# Patient Record
Sex: MALE | Race: WHITE | Employment: OTHER | URBAN - METROPOLITAN AREA
[De-identification: names, ages, dates, MRNs, and addresses within clinical notes are randomized per-mention and may not be internally consistent; named-entity substitution may affect disease eponyms.]

---

## 2017-03-28 ENCOUNTER — OFFICE VISIT (OUTPATIENT)
Dept: PRIMARY CARE CLINIC | Age: 52
End: 2017-03-28
Payer: COMMERCIAL

## 2017-03-28 VITALS
HEIGHT: 64 IN | SYSTOLIC BLOOD PRESSURE: 154 MMHG | TEMPERATURE: 98.7 F | RESPIRATION RATE: 20 BRPM | HEART RATE: 102 BPM | DIASTOLIC BLOOD PRESSURE: 100 MMHG | BODY MASS INDEX: 19.44 KG/M2 | WEIGHT: 113.9 LBS | OXYGEN SATURATION: 94 %

## 2017-03-28 DIAGNOSIS — Z12.5 SCREENING PSA (PROSTATE SPECIFIC ANTIGEN): ICD-10-CM

## 2017-03-28 DIAGNOSIS — J44.9 CHRONIC OBSTRUCTIVE PULMONARY DISEASE, UNSPECIFIED COPD TYPE (HCC): Primary | ICD-10-CM

## 2017-03-28 DIAGNOSIS — Z23 NEED FOR STREPTOCOCCUS PNEUMONIAE VACCINATION: ICD-10-CM

## 2017-03-28 DIAGNOSIS — L98.9 FACIAL SKIN LESION: ICD-10-CM

## 2017-03-28 DIAGNOSIS — Z13.220 LIPID SCREENING: ICD-10-CM

## 2017-03-28 DIAGNOSIS — I10 ESSENTIAL HYPERTENSION: ICD-10-CM

## 2017-03-28 PROCEDURE — 90732 PPSV23 VACC 2 YRS+ SUBQ/IM: CPT | Performed by: NURSE PRACTITIONER

## 2017-03-28 PROCEDURE — 99203 OFFICE O/P NEW LOW 30 MIN: CPT | Performed by: NURSE PRACTITIONER

## 2017-03-28 PROCEDURE — 90471 IMMUNIZATION ADMIN: CPT | Performed by: NURSE PRACTITIONER

## 2017-03-28 RX ORDER — LOSARTAN POTASSIUM 50 MG/1
50 TABLET ORAL DAILY
Qty: 30 TABLET | Refills: 1 | Status: SHIPPED | OUTPATIENT
Start: 2017-03-28 | End: 2017-04-11 | Stop reason: SDUPTHER

## 2017-03-28 RX ORDER — BUDESONIDE AND FORMOTEROL FUMARATE DIHYDRATE 80; 4.5 UG/1; UG/1
2 AEROSOL RESPIRATORY (INHALATION) 2 TIMES DAILY
Qty: 1 INHALER | Refills: 11 | Status: SHIPPED | OUTPATIENT
Start: 2017-03-28 | End: 2018-04-03 | Stop reason: SDUPTHER

## 2017-03-28 RX ORDER — ALBUTEROL SULFATE 90 UG/1
2 AEROSOL, METERED RESPIRATORY (INHALATION) EVERY 6 HOURS PRN
Qty: 1 INHALER | Refills: 3 | Status: SHIPPED | OUTPATIENT
Start: 2017-03-28 | End: 2018-02-26 | Stop reason: SDUPTHER

## 2017-03-28 ASSESSMENT — ENCOUNTER SYMPTOMS
SHORTNESS OF BREATH: 1
NAUSEA: 0
SORE THROAT: 0
WHEEZING: 1
ABDOMINAL PAIN: 0
DIARRHEA: 0
VOMITING: 0
CONSTIPATION: 0
HEMOPTYSIS: 0
SPUTUM PRODUCTION: 1
COUGH: 0
RHINORRHEA: 0

## 2017-03-29 ENCOUNTER — TELEPHONE (OUTPATIENT)
Dept: PRIMARY CARE CLINIC | Age: 52
End: 2017-03-29

## 2017-04-05 ENCOUNTER — OFFICE VISIT (OUTPATIENT)
Dept: SURGERY | Age: 52
End: 2017-04-05
Payer: COMMERCIAL

## 2017-04-05 ENCOUNTER — HOSPITAL ENCOUNTER (OUTPATIENT)
Age: 52
Discharge: HOME | End: 2017-04-05
Payer: COMMERCIAL

## 2017-04-05 VITALS
SYSTOLIC BLOOD PRESSURE: 161 MMHG | TEMPERATURE: 98.4 F | RESPIRATION RATE: 20 BRPM | DIASTOLIC BLOOD PRESSURE: 82 MMHG | HEIGHT: 64 IN | WEIGHT: 115.9 LBS | HEART RATE: 99 BPM | BODY MASS INDEX: 19.79 KG/M2

## 2017-04-05 DIAGNOSIS — J44.9 CHRONIC OBSTRUCTIVE PULMONARY DISEASE, UNSPECIFIED COPD TYPE (HCC): ICD-10-CM

## 2017-04-05 DIAGNOSIS — L98.9 FACIAL LESION: Primary | ICD-10-CM

## 2017-04-05 DIAGNOSIS — Z13.220 LIPID SCREENING: ICD-10-CM

## 2017-04-05 LAB
ALBUMIN SERPL-MCNC: 4.3 G/DL (ref 3.5–5.2)
ALBUMIN/GLOBULIN RATIO: 1.7 (ref 1–2.5)
ALP BLD-CCNC: 84 U/L (ref 40–129)
ALT SERPL-CCNC: 23 U/L (ref 5–41)
ANION GAP SERPL CALCULATED.3IONS-SCNC: 12 MMOL/L (ref 9–17)
AST SERPL-CCNC: 27 U/L
BILIRUB SERPL-MCNC: 1.47 MG/DL (ref 0.3–1.2)
BUN BLDV-MCNC: 11 MG/DL (ref 6–20)
BUN/CREAT BLD: 15 (ref 9–20)
CALCIUM SERPL-MCNC: 9.1 MG/DL (ref 8.6–10.4)
CHLORIDE BLD-SCNC: 98 MMOL/L (ref 98–107)
CHOLESTEROL/HDL RATIO: 3.4
CHOLESTEROL: 218 MG/DL
CO2: 27 MMOL/L (ref 20–31)
CREAT SERPL-MCNC: 0.74 MG/DL (ref 0.7–1.2)
GFR AFRICAN AMERICAN: >60 ML/MIN
GFR NON-AFRICAN AMERICAN: >60 ML/MIN
GFR SERPL CREATININE-BSD FRML MDRD: ABNORMAL ML/MIN/{1.73_M2}
GFR SERPL CREATININE-BSD FRML MDRD: ABNORMAL ML/MIN/{1.73_M2}
GLUCOSE BLD-MCNC: 111 MG/DL (ref 70–99)
HDLC SERPL-MCNC: 64 MG/DL
LDL CHOLESTEROL: 134 MG/DL (ref 0–130)
POTASSIUM SERPL-SCNC: 3.7 MMOL/L (ref 3.7–5.3)
PROSTATE SPECIFIC ANTIGEN: 1.46 UG/L
SODIUM BLD-SCNC: 137 MMOL/L (ref 135–144)
TOTAL PROTEIN: 6.9 G/DL (ref 6.4–8.3)
TRIGL SERPL-MCNC: 100 MG/DL
VLDLC SERPL CALC-MCNC: ABNORMAL MG/DL (ref 1–30)

## 2017-04-05 PROCEDURE — 80061 LIPID PANEL: CPT

## 2017-04-05 PROCEDURE — 80053 COMPREHEN METABOLIC PANEL: CPT

## 2017-04-05 PROCEDURE — 36415 COLL VENOUS BLD VENIPUNCTURE: CPT

## 2017-04-05 PROCEDURE — 99204 OFFICE O/P NEW MOD 45 MIN: CPT | Performed by: SURGERY

## 2017-04-05 PROCEDURE — G0103 PSA SCREENING: HCPCS

## 2017-04-05 RX ORDER — MUPIROCIN CALCIUM 20 MG/G
CREAM TOPICAL
Qty: 1 TUBE | Refills: 0 | Status: SHIPPED | OUTPATIENT
Start: 2017-04-05 | End: 2017-04-11 | Stop reason: ALTCHOICE

## 2017-04-06 ENCOUNTER — HOSPITAL ENCOUNTER (OUTPATIENT)
Dept: PULMONOLOGY | Age: 52
Discharge: HOME | End: 2017-04-06
Payer: COMMERCIAL

## 2017-04-06 DIAGNOSIS — J44.9 CHRONIC OBSTRUCTIVE PULMONARY DISEASE, UNSPECIFIED COPD TYPE (HCC): ICD-10-CM

## 2017-04-06 PROCEDURE — 94729 DIFFUSING CAPACITY: CPT

## 2017-04-06 PROCEDURE — 94060 EVALUATION OF WHEEZING: CPT

## 2017-04-06 PROCEDURE — 94726 PLETHYSMOGRAPHY LUNG VOLUMES: CPT

## 2017-04-06 PROCEDURE — 94664 DEMO&/EVAL PT USE INHALER: CPT

## 2017-04-06 PROCEDURE — 6360000002 HC RX W HCPCS: Performed by: INTERNAL MEDICINE

## 2017-04-06 RX ORDER — ALBUTEROL SULFATE 2.5 MG/3ML
2.5 SOLUTION RESPIRATORY (INHALATION) ONCE
Status: COMPLETED | OUTPATIENT
Start: 2017-04-06 | End: 2017-04-06

## 2017-04-06 RX ADMIN — ALBUTEROL SULFATE 2.5 MG: 2.5 SOLUTION RESPIRATORY (INHALATION) at 13:19

## 2017-04-07 ENCOUNTER — TELEPHONE (OUTPATIENT)
Dept: PRIMARY CARE CLINIC | Age: 52
End: 2017-04-07

## 2017-04-07 DIAGNOSIS — J44.9 CHRONIC OBSTRUCTIVE PULMONARY DISEASE, UNSPECIFIED COPD TYPE (HCC): Primary | ICD-10-CM

## 2017-04-11 ENCOUNTER — OFFICE VISIT (OUTPATIENT)
Dept: PRIMARY CARE CLINIC | Age: 52
End: 2017-04-11
Payer: COMMERCIAL

## 2017-04-11 VITALS
DIASTOLIC BLOOD PRESSURE: 85 MMHG | TEMPERATURE: 99.6 F | SYSTOLIC BLOOD PRESSURE: 119 MMHG | HEART RATE: 106 BPM | RESPIRATION RATE: 20 BRPM | WEIGHT: 115 LBS | BODY MASS INDEX: 19.63 KG/M2 | HEIGHT: 64 IN

## 2017-04-11 DIAGNOSIS — R73.01 ELEVATED FASTING GLUCOSE: ICD-10-CM

## 2017-04-11 DIAGNOSIS — I10 ESSENTIAL HYPERTENSION: Primary | ICD-10-CM

## 2017-04-11 DIAGNOSIS — E78.5 DYSLIPIDEMIA: ICD-10-CM

## 2017-04-11 LAB — HBA1C MFR BLD: 5.3 %

## 2017-04-11 PROCEDURE — 99214 OFFICE O/P EST MOD 30 MIN: CPT | Performed by: NURSE PRACTITIONER

## 2017-04-11 PROCEDURE — 83036 HEMOGLOBIN GLYCOSYLATED A1C: CPT | Performed by: NURSE PRACTITIONER

## 2017-04-11 RX ORDER — LOSARTAN POTASSIUM 50 MG/1
100 TABLET ORAL DAILY
Qty: 90 TABLET | Refills: 1 | Status: SHIPPED | OUTPATIENT
Start: 2017-04-11 | End: 2017-04-12 | Stop reason: SDUPTHER

## 2017-04-11 ASSESSMENT — ENCOUNTER SYMPTOMS: SHORTNESS OF BREATH: 1

## 2017-04-11 ASSESSMENT — PATIENT HEALTH QUESTIONNAIRE - PHQ9
1. LITTLE INTEREST OR PLEASURE IN DOING THINGS: 1
SUM OF ALL RESPONSES TO PHQ QUESTIONS 1-9: 2
2. FEELING DOWN, DEPRESSED OR HOPELESS: 1
SUM OF ALL RESPONSES TO PHQ9 QUESTIONS 1 & 2: 2

## 2017-04-12 ENCOUNTER — ANESTHESIA EVENT (OUTPATIENT)
Dept: OPERATING ROOM | Age: 52
End: 2017-04-12
Payer: COMMERCIAL

## 2017-04-12 DIAGNOSIS — I10 ESSENTIAL HYPERTENSION: ICD-10-CM

## 2017-04-12 RX ORDER — LOSARTAN POTASSIUM 100 MG/1
100 TABLET ORAL DAILY
Qty: 90 TABLET | Refills: 3 | Status: ON HOLD | OUTPATIENT
Start: 2017-04-12 | End: 2018-08-20

## 2017-04-13 ENCOUNTER — ANESTHESIA (OUTPATIENT)
Dept: OPERATING ROOM | Age: 52
End: 2017-04-13
Payer: COMMERCIAL

## 2017-04-13 ENCOUNTER — HOSPITAL ENCOUNTER (OUTPATIENT)
Age: 52
Setting detail: OUTPATIENT SURGERY
Discharge: HOME | End: 2017-04-13
Attending: SURGERY | Admitting: SURGERY
Payer: COMMERCIAL

## 2017-04-13 VITALS
DIASTOLIC BLOOD PRESSURE: 84 MMHG | OXYGEN SATURATION: 95 % | HEART RATE: 90 BPM | TEMPERATURE: 98 F | SYSTOLIC BLOOD PRESSURE: 126 MMHG | BODY MASS INDEX: 19.63 KG/M2 | RESPIRATION RATE: 16 BRPM | HEIGHT: 64 IN | WEIGHT: 115 LBS

## 2017-04-13 VITALS — SYSTOLIC BLOOD PRESSURE: 86 MMHG | OXYGEN SATURATION: 98 % | DIASTOLIC BLOOD PRESSURE: 49 MMHG

## 2017-04-13 PROCEDURE — 2580000003 HC RX 258: Performed by: SURGERY

## 2017-04-13 PROCEDURE — 7100000011 HC PHASE II RECOVERY - ADDTL 15 MIN: Performed by: SURGERY

## 2017-04-13 PROCEDURE — 7100000010 HC PHASE II RECOVERY - FIRST 15 MIN: Performed by: SURGERY

## 2017-04-13 PROCEDURE — 2500000003 HC RX 250 WO HCPCS: Performed by: NURSE ANESTHETIST, CERTIFIED REGISTERED

## 2017-04-13 PROCEDURE — 3700000000 HC ANESTHESIA ATTENDED CARE: Performed by: SURGERY

## 2017-04-13 PROCEDURE — 3600000002 HC SURGERY LEVEL 2 BASE: Performed by: SURGERY

## 2017-04-13 PROCEDURE — 6360000002 HC RX W HCPCS: Performed by: NURSE ANESTHETIST, CERTIFIED REGISTERED

## 2017-04-13 PROCEDURE — 2500000003 HC RX 250 WO HCPCS: Performed by: SURGERY

## 2017-04-13 PROCEDURE — 88305 TISSUE EXAM BY PATHOLOGIST: CPT

## 2017-04-13 PROCEDURE — 3600000012 HC SURGERY LEVEL 2 ADDTL 15MIN: Performed by: SURGERY

## 2017-04-13 PROCEDURE — 3700000001 HC ADD 15 MINUTES (ANESTHESIA): Performed by: SURGERY

## 2017-04-13 RX ORDER — PROPOFOL 10 MG/ML
INJECTION, EMULSION INTRAVENOUS CONTINUOUS PRN
Status: DISCONTINUED | OUTPATIENT
Start: 2017-04-13 | End: 2017-04-13 | Stop reason: SDUPTHER

## 2017-04-13 RX ORDER — BUPIVACAINE HYDROCHLORIDE AND EPINEPHRINE 2.5; 5 MG/ML; UG/ML
INJECTION, SOLUTION EPIDURAL; INFILTRATION; INTRACAUDAL; PERINEURAL PRN
Status: DISCONTINUED | OUTPATIENT
Start: 2017-04-13 | End: 2017-04-13 | Stop reason: HOSPADM

## 2017-04-13 RX ORDER — HYDROCODONE BITARTRATE AND ACETAMINOPHEN 5; 325 MG/1; MG/1
1 TABLET ORAL EVERY 8 HOURS PRN
Qty: 20 TABLET | Refills: 0 | Status: SHIPPED | OUTPATIENT
Start: 2017-04-13 | End: 2017-04-20

## 2017-04-13 RX ORDER — OXYCODONE HYDROCHLORIDE 5 MG/1
5 TABLET ORAL
Status: DISCONTINUED | OUTPATIENT
Start: 2017-04-13 | End: 2017-04-13 | Stop reason: HOSPADM

## 2017-04-13 RX ORDER — FENTANYL CITRATE 50 UG/ML
25 INJECTION, SOLUTION INTRAMUSCULAR; INTRAVENOUS EVERY 5 MIN PRN
Status: DISCONTINUED | OUTPATIENT
Start: 2017-04-13 | End: 2017-04-13 | Stop reason: HOSPADM

## 2017-04-13 RX ORDER — MORPHINE SULFATE 2 MG/ML
2 INJECTION, SOLUTION INTRAMUSCULAR; INTRAVENOUS EVERY 5 MIN PRN
Status: DISCONTINUED | OUTPATIENT
Start: 2017-04-13 | End: 2017-04-13 | Stop reason: HOSPADM

## 2017-04-13 RX ORDER — PROPOFOL 10 MG/ML
INJECTION, EMULSION INTRAVENOUS PRN
Status: DISCONTINUED | OUTPATIENT
Start: 2017-04-13 | End: 2017-04-13 | Stop reason: SDUPTHER

## 2017-04-13 RX ORDER — SODIUM CHLORIDE, SODIUM LACTATE, POTASSIUM CHLORIDE, CALCIUM CHLORIDE 600; 310; 30; 20 MG/100ML; MG/100ML; MG/100ML; MG/100ML
INJECTION, SOLUTION INTRAVENOUS CONTINUOUS
Status: DISCONTINUED | OUTPATIENT
Start: 2017-04-13 | End: 2017-04-13 | Stop reason: HOSPADM

## 2017-04-13 RX ORDER — MEPERIDINE HYDROCHLORIDE 25 MG/ML
12.5 INJECTION INTRAMUSCULAR; INTRAVENOUS; SUBCUTANEOUS EVERY 5 MIN PRN
Status: DISCONTINUED | OUTPATIENT
Start: 2017-04-13 | End: 2017-04-13 | Stop reason: HOSPADM

## 2017-04-13 RX ORDER — LIDOCAINE HYDROCHLORIDE 20 MG/ML
INJECTION, SOLUTION INFILTRATION; PERINEURAL PRN
Status: DISCONTINUED | OUTPATIENT
Start: 2017-04-13 | End: 2017-04-13 | Stop reason: SDUPTHER

## 2017-04-13 RX ORDER — MIDAZOLAM HYDROCHLORIDE 1 MG/ML
INJECTION INTRAMUSCULAR; INTRAVENOUS PRN
Status: DISCONTINUED | OUTPATIENT
Start: 2017-04-13 | End: 2017-04-13 | Stop reason: SDUPTHER

## 2017-04-13 RX ORDER — FENTANYL CITRATE 50 UG/ML
INJECTION, SOLUTION INTRAMUSCULAR; INTRAVENOUS PRN
Status: DISCONTINUED | OUTPATIENT
Start: 2017-04-13 | End: 2017-04-13 | Stop reason: SDUPTHER

## 2017-04-13 RX ADMIN — MIDAZOLAM HYDROCHLORIDE 2 MG: 1 INJECTION, SOLUTION INTRAMUSCULAR; INTRAVENOUS at 13:20

## 2017-04-13 RX ADMIN — FENTANYL CITRATE 50 MCG: 50 INJECTION INTRAMUSCULAR; INTRAVENOUS at 13:20

## 2017-04-13 RX ADMIN — FENTANYL CITRATE 50 MCG: 50 INJECTION INTRAMUSCULAR; INTRAVENOUS at 13:35

## 2017-04-13 RX ADMIN — PROPOFOL 120 MCG/KG/MIN: 10 INJECTION, EMULSION INTRAVENOUS at 13:35

## 2017-04-13 RX ADMIN — LIDOCAINE HYDROCHLORIDE 100 MG: 20 INJECTION, SOLUTION INFILTRATION; PERINEURAL at 13:30

## 2017-04-13 RX ADMIN — PROPOFOL 160 MG: 10 INJECTION, EMULSION INTRAVENOUS at 13:30

## 2017-04-13 RX ADMIN — SODIUM CHLORIDE, POTASSIUM CHLORIDE, SODIUM LACTATE AND CALCIUM CHLORIDE: 600; 310; 30; 20 INJECTION, SOLUTION INTRAVENOUS at 11:15

## 2017-04-13 RX ADMIN — SODIUM CHLORIDE, POTASSIUM CHLORIDE, SODIUM LACTATE AND CALCIUM CHLORIDE: 600; 310; 30; 20 INJECTION, SOLUTION INTRAVENOUS at 13:50

## 2017-04-13 RX ADMIN — SODIUM CHLORIDE, POTASSIUM CHLORIDE, SODIUM LACTATE AND CALCIUM CHLORIDE: 600; 310; 30; 20 INJECTION, SOLUTION INTRAVENOUS at 13:00

## 2017-04-13 RX ADMIN — PROPOFOL 40 MG: 10 INJECTION, EMULSION INTRAVENOUS at 13:35

## 2017-04-13 ASSESSMENT — COPD QUESTIONNAIRES: CAT_SEVERITY: SEVERE

## 2017-04-13 ASSESSMENT — ENCOUNTER SYMPTOMS: DYSPNEA ACTIVITY LEVEL: AFTER AMBULATING 1 FLIGHT OF STAIRS

## 2017-04-13 ASSESSMENT — PAIN SCALES - GENERAL
PAINLEVEL_OUTOF10: 0

## 2017-04-13 ASSESSMENT — PAIN - FUNCTIONAL ASSESSMENT: PAIN_FUNCTIONAL_ASSESSMENT: 0-10

## 2017-04-17 LAB — DERMATOLOGY PATHOLOGY REPORT: NORMAL

## 2017-04-20 ENCOUNTER — OFFICE VISIT (OUTPATIENT)
Dept: SURGERY | Age: 52
End: 2017-04-20
Payer: COMMERCIAL

## 2017-04-20 ENCOUNTER — TELEPHONE (OUTPATIENT)
Dept: SURGERY | Age: 52
End: 2017-04-20

## 2017-04-20 VITALS
WEIGHT: 115 LBS | SYSTOLIC BLOOD PRESSURE: 142 MMHG | RESPIRATION RATE: 18 BRPM | OXYGEN SATURATION: 96 % | DIASTOLIC BLOOD PRESSURE: 103 MMHG | TEMPERATURE: 98.6 F | BODY MASS INDEX: 19.63 KG/M2 | HEART RATE: 91 BPM | HEIGHT: 64 IN

## 2017-04-20 DIAGNOSIS — C44.91 BASAL CELL CARCINOMA: Primary | ICD-10-CM

## 2017-04-20 PROCEDURE — 99212 OFFICE O/P EST SF 10 MIN: CPT | Performed by: SURGERY

## 2017-04-26 ENCOUNTER — NURSE ONLY (OUTPATIENT)
Dept: PRIMARY CARE CLINIC | Age: 52
End: 2017-04-26

## 2017-04-26 VITALS
TEMPERATURE: 98.9 F | WEIGHT: 116.1 LBS | HEART RATE: 101 BPM | BODY MASS INDEX: 19.82 KG/M2 | RESPIRATION RATE: 16 BRPM | DIASTOLIC BLOOD PRESSURE: 88 MMHG | SYSTOLIC BLOOD PRESSURE: 125 MMHG | HEIGHT: 64 IN

## 2017-04-26 DIAGNOSIS — I10 ESSENTIAL HYPERTENSION: Primary | ICD-10-CM

## 2017-06-01 ENCOUNTER — OFFICE VISIT (OUTPATIENT)
Dept: PULMONOLOGY | Age: 52
End: 2017-06-01
Payer: COMMERCIAL

## 2017-06-01 VITALS
DIASTOLIC BLOOD PRESSURE: 104 MMHG | WEIGHT: 115 LBS | OXYGEN SATURATION: 96 % | TEMPERATURE: 97.9 F | HEART RATE: 108 BPM | HEIGHT: 64 IN | BODY MASS INDEX: 19.63 KG/M2 | SYSTOLIC BLOOD PRESSURE: 156 MMHG | RESPIRATION RATE: 16 BRPM

## 2017-06-01 DIAGNOSIS — J44.9 CHRONIC OBSTRUCTIVE PULMONARY DISEASE, UNSPECIFIED COPD TYPE (HCC): Primary | ICD-10-CM

## 2017-06-01 DIAGNOSIS — R05.3 CHRONIC COUGH: ICD-10-CM

## 2017-06-01 DIAGNOSIS — R06.09 DYSPNEA ON EXERTION: ICD-10-CM

## 2017-06-01 PROCEDURE — 99204 OFFICE O/P NEW MOD 45 MIN: CPT | Performed by: INTERNAL MEDICINE

## 2017-06-01 RX ORDER — ALBUTEROL SULFATE 2.5 MG/3ML
2.5 SOLUTION RESPIRATORY (INHALATION) EVERY 6 HOURS PRN
Qty: 120 EACH | Refills: 11 | Status: SHIPPED | OUTPATIENT
Start: 2017-06-01 | End: 2019-01-22 | Stop reason: CLARIF

## 2017-06-07 ENCOUNTER — TELEPHONE (OUTPATIENT)
Dept: PULMONOLOGY | Age: 52
End: 2017-06-07

## 2017-06-07 ENCOUNTER — HOSPITAL ENCOUNTER (OUTPATIENT)
Dept: PULMONOLOGY | Age: 52
Discharge: HOME | End: 2017-06-07
Payer: COMMERCIAL

## 2017-06-07 DIAGNOSIS — J44.9 CHRONIC OBSTRUCTIVE PULMONARY DISEASE, UNSPECIFIED COPD TYPE (HCC): ICD-10-CM

## 2017-06-07 PROCEDURE — 94761 N-INVAS EAR/PLS OXIMETRY MLT: CPT

## 2017-06-08 ENCOUNTER — PATIENT MESSAGE (OUTPATIENT)
Dept: PRIMARY CARE CLINIC | Age: 52
End: 2017-06-08

## 2017-06-08 DIAGNOSIS — J44.9 CHRONIC OBSTRUCTIVE PULMONARY DISEASE, UNSPECIFIED COPD TYPE (HCC): Primary | ICD-10-CM

## 2017-06-13 ENCOUNTER — PATIENT MESSAGE (OUTPATIENT)
Dept: PRIMARY CARE CLINIC | Age: 52
End: 2017-06-13

## 2017-06-16 ENCOUNTER — TELEPHONE (OUTPATIENT)
Dept: SURGERY | Age: 52
End: 2017-06-16

## 2017-06-22 DIAGNOSIS — Z11.59 NEED FOR HEPATITIS C SCREENING TEST: Primary | ICD-10-CM

## 2017-06-22 DIAGNOSIS — Z11.4 SCREENING FOR HIV (HUMAN IMMUNODEFICIENCY VIRUS): ICD-10-CM

## 2017-06-23 ENCOUNTER — HOSPITAL ENCOUNTER (OUTPATIENT)
Age: 52
Discharge: HOME | End: 2017-06-23
Payer: COMMERCIAL

## 2017-06-23 DIAGNOSIS — Z12.5 SCREENING PSA (PROSTATE SPECIFIC ANTIGEN): ICD-10-CM

## 2017-06-23 DIAGNOSIS — Z11.4 SCREENING FOR HIV (HUMAN IMMUNODEFICIENCY VIRUS): ICD-10-CM

## 2017-06-23 DIAGNOSIS — Z11.59 NEED FOR HEPATITIS C SCREENING TEST: ICD-10-CM

## 2017-06-23 LAB
HEPATITIS C ANTIBODY: NONREACTIVE
HIV AG/AB: NONREACTIVE
PROSTATE SPECIFIC ANTIGEN: 1.69 UG/L

## 2017-06-23 PROCEDURE — 36415 COLL VENOUS BLD VENIPUNCTURE: CPT

## 2017-06-23 PROCEDURE — 87389 HIV-1 AG W/HIV-1&-2 AB AG IA: CPT

## 2017-06-23 PROCEDURE — 86803 HEPATITIS C AB TEST: CPT

## 2017-06-23 PROCEDURE — G0103 PSA SCREENING: HCPCS

## 2017-06-26 ENCOUNTER — TELEPHONE (OUTPATIENT)
Dept: PRIMARY CARE CLINIC | Age: 52
End: 2017-06-26

## 2017-06-27 ENCOUNTER — TELEPHONE (OUTPATIENT)
Dept: SURGERY | Age: 52
End: 2017-06-27

## 2017-06-27 ENCOUNTER — PATIENT MESSAGE (OUTPATIENT)
Dept: PRIMARY CARE CLINIC | Age: 52
End: 2017-06-27

## 2017-06-29 ENCOUNTER — TELEPHONE (OUTPATIENT)
Dept: SURGERY | Age: 52
End: 2017-06-29

## 2017-06-29 DIAGNOSIS — C44.319 BASAL CELL CARCINOMA OF LEFT CHEEK: Primary | ICD-10-CM

## 2017-07-11 ENCOUNTER — OFFICE VISIT (OUTPATIENT)
Dept: PRIMARY CARE CLINIC | Age: 52
End: 2017-07-11
Payer: COMMERCIAL

## 2017-07-11 VITALS
HEART RATE: 133 BPM | WEIGHT: 112.6 LBS | HEIGHT: 64 IN | SYSTOLIC BLOOD PRESSURE: 107 MMHG | DIASTOLIC BLOOD PRESSURE: 75 MMHG | BODY MASS INDEX: 19.22 KG/M2 | RESPIRATION RATE: 20 BRPM | TEMPERATURE: 99.7 F

## 2017-07-11 DIAGNOSIS — E78.5 DYSLIPIDEMIA: ICD-10-CM

## 2017-07-11 DIAGNOSIS — I10 ESSENTIAL HYPERTENSION: Primary | ICD-10-CM

## 2017-07-11 DIAGNOSIS — Z12.12 SCREENING FOR COLORECTAL CANCER: Primary | ICD-10-CM

## 2017-07-11 DIAGNOSIS — Z12.11 SCREENING FOR COLORECTAL CANCER: Primary | ICD-10-CM

## 2017-07-11 PROCEDURE — 99214 OFFICE O/P EST MOD 30 MIN: CPT | Performed by: NURSE PRACTITIONER

## 2017-07-11 ASSESSMENT — ENCOUNTER SYMPTOMS
VOMITING: 0
CONSTIPATION: 0
COUGH: 1
NAUSEA: 0
WHEEZING: 0
SORE THROAT: 0
RHINORRHEA: 0
SHORTNESS OF BREATH: 1
ABDOMINAL PAIN: 0
DIARRHEA: 0

## 2017-07-14 ENCOUNTER — TELEPHONE (OUTPATIENT)
Dept: PRIMARY CARE CLINIC | Age: 52
End: 2017-07-14

## 2017-07-14 DIAGNOSIS — Z12.12 SCREENING FOR COLORECTAL CANCER: ICD-10-CM

## 2017-07-14 DIAGNOSIS — Z12.11 SCREENING FOR COLORECTAL CANCER: ICD-10-CM

## 2017-07-14 LAB
CONTROL: PRESENT
HEMOCCULT STL QL: NEGATIVE

## 2017-07-14 PROCEDURE — 82274 ASSAY TEST FOR BLOOD FECAL: CPT | Performed by: NURSE PRACTITIONER

## 2017-07-19 ENCOUNTER — PATIENT MESSAGE (OUTPATIENT)
Dept: PRIMARY CARE CLINIC | Age: 52
End: 2017-07-19

## 2017-07-20 ENCOUNTER — PATIENT MESSAGE (OUTPATIENT)
Dept: PRIMARY CARE CLINIC | Age: 52
End: 2017-07-20

## 2017-08-31 ENCOUNTER — OFFICE VISIT (OUTPATIENT)
Dept: PULMONOLOGY | Age: 52
End: 2017-08-31
Payer: COMMERCIAL

## 2017-08-31 VITALS
BODY MASS INDEX: 19.12 KG/M2 | OXYGEN SATURATION: 96 % | WEIGHT: 112 LBS | RESPIRATION RATE: 24 BRPM | HEIGHT: 64 IN | HEART RATE: 109 BPM | DIASTOLIC BLOOD PRESSURE: 104 MMHG | TEMPERATURE: 98.5 F | SYSTOLIC BLOOD PRESSURE: 140 MMHG

## 2017-08-31 DIAGNOSIS — R06.09 DYSPNEA ON EXERTION: ICD-10-CM

## 2017-08-31 DIAGNOSIS — Z72.0 TOBACCO ABUSE: ICD-10-CM

## 2017-08-31 DIAGNOSIS — R64 PULMONARY CACHEXIA DUE TO COPD (HCC): ICD-10-CM

## 2017-08-31 DIAGNOSIS — J44.9 PULMONARY CACHEXIA DUE TO COPD (HCC): ICD-10-CM

## 2017-08-31 DIAGNOSIS — R05.3 CHRONIC COUGH: ICD-10-CM

## 2017-08-31 DIAGNOSIS — J43.2 CENTRILOBULAR EMPHYSEMA (HCC): Primary | ICD-10-CM

## 2017-08-31 PROCEDURE — 99214 OFFICE O/P EST MOD 30 MIN: CPT | Performed by: INTERNAL MEDICINE

## 2017-12-26 ENCOUNTER — HOSPITAL ENCOUNTER (OUTPATIENT)
Age: 52
Discharge: HOME | End: 2017-12-26
Payer: COMMERCIAL

## 2017-12-26 DIAGNOSIS — J43.2 CENTRILOBULAR EMPHYSEMA (HCC): ICD-10-CM

## 2017-12-26 DIAGNOSIS — I10 ESSENTIAL HYPERTENSION: ICD-10-CM

## 2017-12-26 DIAGNOSIS — E78.5 DYSLIPIDEMIA: ICD-10-CM

## 2017-12-26 LAB
ABSOLUTE EOS #: 0.1 K/UL (ref 0–0.4)
ABSOLUTE IMMATURE GRANULOCYTE: ABNORMAL K/UL (ref 0–0.3)
ABSOLUTE LYMPH #: 1.9 K/UL (ref 1–4.8)
ABSOLUTE MONO #: 0.4 K/UL (ref 0–1)
ALBUMIN SERPL-MCNC: 4.2 G/DL (ref 3.5–5.2)
ALBUMIN/GLOBULIN RATIO: 1.2 (ref 1–2.5)
ALP BLD-CCNC: 94 U/L (ref 40–129)
ALPHA-1 ANTITRYPSIN: 205 MG/DL (ref 90–200)
ALT SERPL-CCNC: 25 U/L (ref 5–41)
ANION GAP SERPL CALCULATED.3IONS-SCNC: 13 MMOL/L (ref 9–17)
AST SERPL-CCNC: 27 U/L
BASOPHILS # BLD: 1 % (ref 0–2)
BASOPHILS ABSOLUTE: 0.1 K/UL (ref 0–0.2)
BILIRUB SERPL-MCNC: 0.73 MG/DL (ref 0.3–1.2)
BUN BLDV-MCNC: 17 MG/DL (ref 6–20)
BUN/CREAT BLD: 19 (ref 9–20)
CALCIUM SERPL-MCNC: 9.6 MG/DL (ref 8.6–10.4)
CHLORIDE BLD-SCNC: 94 MMOL/L (ref 98–107)
CHOLESTEROL/HDL RATIO: 2.9
CHOLESTEROL: 218 MG/DL
CO2: 29 MMOL/L (ref 20–31)
CREAT SERPL-MCNC: 0.9 MG/DL (ref 0.7–1.2)
CREATININE URINE: 153.6 MG/DL (ref 39–259)
DIFFERENTIAL TYPE: ABNORMAL
EOSINOPHILS RELATIVE PERCENT: 1 % (ref 0–8)
GFR AFRICAN AMERICAN: >60 ML/MIN
GFR NON-AFRICAN AMERICAN: >60 ML/MIN
GFR SERPL CREATININE-BSD FRML MDRD: ABNORMAL ML/MIN/{1.73_M2}
GFR SERPL CREATININE-BSD FRML MDRD: ABNORMAL ML/MIN/{1.73_M2}
GLUCOSE BLD-MCNC: 232 MG/DL (ref 70–99)
HCT VFR BLD CALC: 53.4 % (ref 41–53)
HDLC SERPL-MCNC: 75 MG/DL
HEMOGLOBIN: 17.4 G/DL (ref 13.5–17)
IMMATURE GRANULOCYTES: ABNORMAL %
LDL CHOLESTEROL: 128 MG/DL (ref 0–130)
LYMPHOCYTES # BLD: 19 % (ref 24–44)
MCH RBC QN AUTO: 32.2 PG (ref 26–34)
MCHC RBC AUTO-ENTMCNC: 32.5 G/DL (ref 31–37)
MCV RBC AUTO: 99.1 FL (ref 80–100)
MICROALBUMIN/CREAT 24H UR: <12 MG/L
MICROALBUMIN/CREAT UR-RTO: NORMAL MCG/MG CREAT
MONOCYTES # BLD: 4 % (ref 0–12)
PDW BLD-RTO: 14.3 % (ref 12.1–15.2)
PLATELET # BLD: 236 K/UL (ref 140–450)
PLATELET ESTIMATE: ABNORMAL
PMV BLD AUTO: 8.2 FL (ref 6–12)
POTASSIUM SERPL-SCNC: 4.4 MMOL/L (ref 3.7–5.3)
RBC # BLD: 5.39 M/UL (ref 4.5–5.9)
RBC # BLD: ABNORMAL 10*6/UL
SEG NEUTROPHILS: 75 % (ref 36–66)
SEGMENTED NEUTROPHILS ABSOLUTE COUNT: 7.9 K/UL (ref 1.8–7.7)
SODIUM BLD-SCNC: 136 MMOL/L (ref 135–144)
TOTAL PROTEIN: 7.6 G/DL (ref 6.4–8.3)
TRIGL SERPL-MCNC: 75 MG/DL
VLDLC SERPL CALC-MCNC: ABNORMAL MG/DL (ref 1–30)
WBC # BLD: 10.3 K/UL (ref 3.5–11)
WBC # BLD: ABNORMAL 10*3/UL

## 2017-12-26 PROCEDURE — 80053 COMPREHEN METABOLIC PANEL: CPT

## 2017-12-26 PROCEDURE — 82103 ALPHA-1-ANTITRYPSIN TOTAL: CPT

## 2017-12-26 PROCEDURE — 85025 COMPLETE CBC W/AUTO DIFF WBC: CPT

## 2017-12-26 PROCEDURE — 82570 ASSAY OF URINE CREATININE: CPT

## 2017-12-26 PROCEDURE — 80061 LIPID PANEL: CPT

## 2017-12-26 PROCEDURE — 82043 UR ALBUMIN QUANTITATIVE: CPT

## 2017-12-26 PROCEDURE — 36415 COLL VENOUS BLD VENIPUNCTURE: CPT

## 2018-01-09 ENCOUNTER — OFFICE VISIT (OUTPATIENT)
Dept: PRIMARY CARE CLINIC | Age: 53
End: 2018-01-09
Payer: COMMERCIAL

## 2018-01-09 VITALS
DIASTOLIC BLOOD PRESSURE: 66 MMHG | TEMPERATURE: 98.1 F | HEIGHT: 64 IN | RESPIRATION RATE: 20 BRPM | BODY MASS INDEX: 17.16 KG/M2 | SYSTOLIC BLOOD PRESSURE: 107 MMHG | HEART RATE: 128 BPM | WEIGHT: 100.5 LBS

## 2018-01-09 DIAGNOSIS — B02.9 HERPES ZOSTER WITHOUT COMPLICATION: ICD-10-CM

## 2018-01-09 DIAGNOSIS — R73.01 ELEVATED FASTING GLUCOSE: ICD-10-CM

## 2018-01-09 DIAGNOSIS — R63.4 WEIGHT LOSS: ICD-10-CM

## 2018-01-09 DIAGNOSIS — I10 ESSENTIAL HYPERTENSION: Primary | ICD-10-CM

## 2018-01-09 LAB — HBA1C MFR BLD: 5.4 %

## 2018-01-09 PROCEDURE — G8484 FLU IMMUNIZE NO ADMIN: HCPCS | Performed by: NURSE PRACTITIONER

## 2018-01-09 PROCEDURE — 99214 OFFICE O/P EST MOD 30 MIN: CPT | Performed by: NURSE PRACTITIONER

## 2018-01-09 PROCEDURE — 3017F COLORECTAL CA SCREEN DOC REV: CPT | Performed by: NURSE PRACTITIONER

## 2018-01-09 PROCEDURE — G8419 CALC BMI OUT NRM PARAM NOF/U: HCPCS | Performed by: NURSE PRACTITIONER

## 2018-01-09 PROCEDURE — 83036 HEMOGLOBIN GLYCOSYLATED A1C: CPT | Performed by: NURSE PRACTITIONER

## 2018-01-09 PROCEDURE — G8427 DOCREV CUR MEDS BY ELIG CLIN: HCPCS | Performed by: NURSE PRACTITIONER

## 2018-01-09 PROCEDURE — 4004F PT TOBACCO SCREEN RCVD TLK: CPT | Performed by: NURSE PRACTITIONER

## 2018-01-09 RX ORDER — OXYCODONE HYDROCHLORIDE AND ACETAMINOPHEN 5; 325 MG/1; MG/1
1 TABLET ORAL EVERY 6 HOURS PRN
Qty: 12 TABLET | Refills: 0 | Status: SHIPPED | OUTPATIENT
Start: 2018-01-09 | End: 2018-01-09 | Stop reason: SDUPTHER

## 2018-01-09 RX ORDER — OXYCODONE HYDROCHLORIDE AND ACETAMINOPHEN 5; 325 MG/1; MG/1
1 TABLET ORAL EVERY 6 HOURS PRN
Qty: 12 TABLET | Refills: 0 | Status: SHIPPED | OUTPATIENT
Start: 2018-01-09 | End: 2018-01-12

## 2018-01-09 RX ORDER — ACYCLOVIR 800 MG/1
800 TABLET ORAL
Qty: 50 TABLET | Refills: 0 | Status: SHIPPED | OUTPATIENT
Start: 2018-01-09 | End: 2018-01-19

## 2018-01-09 RX ORDER — FAMCICLOVIR 500 MG/1
500 TABLET, FILM COATED ORAL 3 TIMES DAILY
Qty: 21 TABLET | Refills: 0 | Status: SHIPPED | OUTPATIENT
Start: 2018-01-09 | End: 2018-01-09

## 2018-01-09 RX ORDER — OXYCODONE HYDROCHLORIDE AND ACETAMINOPHEN 5; 325 MG/1; MG/1
1 TABLET ORAL EVERY 6 HOURS PRN
Qty: 40 TABLET | Refills: 0 | Status: SHIPPED | OUTPATIENT
Start: 2018-01-09 | End: 2018-01-09 | Stop reason: SDUPTHER

## 2018-01-09 ASSESSMENT — ENCOUNTER SYMPTOMS
SORE THROAT: 0
VOMITING: 0
COUGH: 0
WHEEZING: 0
SHORTNESS OF BREATH: 1
BACK PAIN: 0
RHINORRHEA: 0
CONSTIPATION: 0
NAUSEA: 0
DIARRHEA: 0
ABDOMINAL PAIN: 0
BLURRED VISION: 0

## 2018-01-09 NOTE — PATIENT INSTRUCTIONS
are trying to quit smoking. · Consider signing up for a smoking cessation program, such as the American Lung Association's Freedom from Smoking program.  · Set a quit date. Pick your date carefully so that it is not right in the middle of a big deadline or stressful time. Once you quit, do not even take a puff. Get rid of all ashtrays and lighters after your last cigarette. Clean your house and your clothes so that they do not smell of smoke. · Learn how to be a nonsmoker. Think about ways you can avoid those things that make you reach for a cigarette. ¨ Avoid situations that put you at greatest risk for smoking. For some people, it is hard to have a drink with friends without smoking. For others, they might skip a coffee break with coworkers who smoke. ¨ Change your daily routine. Take a different route to work or eat a meal in a different place. · Cut down on stress. Calm yourself or release tension by doing an activity you enjoy, such as reading a book, taking a hot bath, or gardening. · Talk to your doctor or pharmacist about nicotine replacement therapy, which replaces the nicotine in your body. You still get nicotine but you do not use tobacco. Nicotine replacement products help you slowly reduce the amount of nicotine you need. These products come in several forms, many of them available over-the-counter:  ¨ Nicotine patches  ¨ Nicotine gum and lozenges  ¨ Nicotine inhaler  · Ask your doctor about bupropion (Wellbutrin) or varenicline (Chantix), which are prescription medicines. They do not contain nicotine. They help you by reducing withdrawal symptoms, such as stress and anxiety. · Some people find hypnosis, acupuncture, and massage helpful for ending the smoking habit. · Eat a healthy diet and get regular exercise. Having healthy habits will help your body move past its craving for nicotine. · Be prepared to keep trying. Most people are not successful the first few times they try to quit.  Do not

## 2018-01-31 ENCOUNTER — PATIENT MESSAGE (OUTPATIENT)
Dept: PRIMARY CARE CLINIC | Age: 53
End: 2018-01-31

## 2018-02-01 NOTE — TELEPHONE ENCOUNTER
From: Tad Chang  To: Janes Phelps CNP  Sent: 1/31/2018 11:58 PM EST  Subject: Non-Urgent Medical Question    To whom it may concern, My name is Casey Pearce, I got a question my sinuses have been running like a faucet and it is not controllable by store sinus meds nor weed don't help either so whats up with that it obstructs me to breathe oxygen, and I feel really really rough I just don't want to go to the er for something that not an emergency but when I blow my nose sometimes there is a little bit of blood in my snot I'm not a doctor this shit has got me scared as long as I am dam near still, I can breathe but when I go to even tie my shoes I get out of air due to the sinus problem can you help me Mr Phelps? I am begging.  Thank you for your alls time Casey Pearce

## 2018-02-13 ENCOUNTER — HOSPITAL ENCOUNTER (OUTPATIENT)
Dept: GENERAL RADIOLOGY | Age: 53
Discharge: HOME | End: 2018-02-15
Payer: COMMERCIAL

## 2018-02-13 ENCOUNTER — TELEPHONE (OUTPATIENT)
Dept: PRIMARY CARE CLINIC | Age: 53
End: 2018-02-13

## 2018-02-13 ENCOUNTER — HOSPITAL ENCOUNTER (OUTPATIENT)
Age: 53
Discharge: HOME | End: 2018-02-15
Payer: COMMERCIAL

## 2018-02-13 DIAGNOSIS — R63.4 WEIGHT LOSS: ICD-10-CM

## 2018-02-13 PROCEDURE — 71046 X-RAY EXAM CHEST 2 VIEWS: CPT

## 2018-02-22 ENCOUNTER — OFFICE VISIT (OUTPATIENT)
Dept: PULMONOLOGY | Age: 53
End: 2018-02-22
Payer: COMMERCIAL

## 2018-02-22 VITALS
RESPIRATION RATE: 20 BRPM | WEIGHT: 98 LBS | DIASTOLIC BLOOD PRESSURE: 95 MMHG | BODY MASS INDEX: 16.73 KG/M2 | HEIGHT: 64 IN | SYSTOLIC BLOOD PRESSURE: 119 MMHG | TEMPERATURE: 98.7 F | OXYGEN SATURATION: 96 % | HEART RATE: 123 BPM

## 2018-02-22 DIAGNOSIS — R64 PULMONARY CACHEXIA DUE TO CHRONIC OBSTRUCTIVE PULMONARY DISEASE (HCC): ICD-10-CM

## 2018-02-22 DIAGNOSIS — J44.9 STAGE 4 VERY SEVERE COPD BY GOLD CLASSIFICATION (HCC): Primary | ICD-10-CM

## 2018-02-22 DIAGNOSIS — J96.11 CHRONIC RESPIRATORY FAILURE WITH HYPOXIA AND HYPERCAPNIA (HCC): ICD-10-CM

## 2018-02-22 DIAGNOSIS — J43.1 PANLOBULAR EMPHYSEMA (HCC): ICD-10-CM

## 2018-02-22 DIAGNOSIS — J44.9 PULMONARY CACHEXIA DUE TO CHRONIC OBSTRUCTIVE PULMONARY DISEASE (HCC): ICD-10-CM

## 2018-02-22 DIAGNOSIS — J96.12 CHRONIC RESPIRATORY FAILURE WITH HYPOXIA AND HYPERCAPNIA (HCC): ICD-10-CM

## 2018-02-22 DIAGNOSIS — F17.200 TOBACCO DEPENDENCE: ICD-10-CM

## 2018-02-22 DIAGNOSIS — J32.9 CHRONIC SINUSITIS, UNSPECIFIED LOCATION: ICD-10-CM

## 2018-02-22 DIAGNOSIS — R05.3 CHRONIC COUGH: ICD-10-CM

## 2018-02-22 PROCEDURE — 99214 OFFICE O/P EST MOD 30 MIN: CPT | Performed by: INTERNAL MEDICINE

## 2018-02-22 PROCEDURE — G8484 FLU IMMUNIZE NO ADMIN: HCPCS | Performed by: INTERNAL MEDICINE

## 2018-02-22 PROCEDURE — G8926 SPIRO NO PERF OR DOC: HCPCS | Performed by: INTERNAL MEDICINE

## 2018-02-22 PROCEDURE — G8427 DOCREV CUR MEDS BY ELIG CLIN: HCPCS | Performed by: INTERNAL MEDICINE

## 2018-02-22 PROCEDURE — 4004F PT TOBACCO SCREEN RCVD TLK: CPT | Performed by: INTERNAL MEDICINE

## 2018-02-22 PROCEDURE — 3017F COLORECTAL CA SCREEN DOC REV: CPT | Performed by: INTERNAL MEDICINE

## 2018-02-22 PROCEDURE — G8419 CALC BMI OUT NRM PARAM NOF/U: HCPCS | Performed by: INTERNAL MEDICINE

## 2018-02-22 PROCEDURE — 3023F SPIROM DOC REV: CPT | Performed by: INTERNAL MEDICINE

## 2018-02-22 RX ORDER — AMOXICILLIN AND CLAVULANATE POTASSIUM 500; 125 MG/1; MG/1
1 TABLET, FILM COATED ORAL 2 TIMES DAILY
Qty: 20 TABLET | Refills: 0 | Status: SHIPPED | OUTPATIENT
Start: 2018-02-22 | End: 2018-03-04

## 2018-02-22 NOTE — PROGRESS NOTES
in [address] [place] [address] and not working now      Past Medical History:        Diagnosis Date    Arthritis     Hypertension     Scoliosis        Past Surgical History:        Procedure Laterality Date    FACIAL RECONSTRUCTION SURGERY Left 4/13/2017    FACIAL LESION BIOPSY EXCISION LEFT CHEEK performed by Chilango Lazo DO at [address] [facility] Left 04/13/2017    facial lesion biopsy    TONSILLECTOMY         Allergies:    No Known Allergies      Home Meds:   Outpatient Encounter Prescriptions as of 2/22/2018   Medication Sig Dispense Refill    ipratropium (ATROVENT) 0.02 % nebulizer solution Take 2.5 mLs by nebulization 4 times daily 300 mL 11    Umeclidinium Bromide (INCRUSE ELLIPTA) 62.5 MCG/INH AEPB Inhale 1 puff into the lungs daily 1 each 11    albuterol (PROVENTIL) (2.5 MG/3ML) 0.083% nebulizer solution Take 3 mLs by nebulization every 6 hours as needed for Wheezing 120 each 11    budesonide-formoterol (SYMBICORT) 80-4.5 MCG/ACT AERO Inhale 2 puffs into the lungs 2 times daily 1 Inhaler 11    albuterol sulfate HFA (VENTOLIN HFA) 108 (90 BASE) MCG/ACT inhaler Inhale 2 puffs into the lungs every 6 hours as needed for Wheezing 1 Inhaler 3    losartan (COZAAR) 100 MG tablet Take 1 tablet by mouth daily 90 tablet 3     No facility-administered encounter medications on file as of 2/22/2018. Social History:   TOBACCO:   reports that he has been smoking Cigarettes. He has been smoking about 1.00 pack per day for 35. He has never used smokeless tobacco. Use marijuana in the past  ETOH:   reports that he drinks about 3.6 oz of alcohol per week .   OCCUPATION:  As above    Family History:       Problem Relation Age of Onset    Stroke Mother     Emphysema Father     Cancer Father     Diabetes Paternal Grandmother     Stroke Paternal Grandmother        Immunizations:    Immunization History   Administered Date(s) Administered    Pneumococcal Polysaccharide ([id number]) 03/28/2017    Tdap to person, place, and time  Eyes - pupils equal and reactive, extraocular eye movements intact  Ears - right ear normal, left ear normal  Nose - normal and patent, no erythema, discharge or polyps  Mouth - mucous membranes moist, pharynx normal without lesions  Neck - supple, no significant adenopathy  Chest - no tachypnea, retractions or cyanosis, no wheezing noted , decreased air entry noted and distant breath and severely reduced breath sounds and increase resonance on percussion  Heart - normal rate, regular rhythm, normal S1, S2, no murmurs, rubs, clicks or gallops  Abdomen - soft, nontender, nondistended, no masses or organomegaly  Neurological - alert, oriented, normal speech, no focal findings or movement disorder noted}  Extremities - peripheral pulses normal, no pedal edema, no clubbing or cyanosis  Skin - normal coloration and turgor, no rashes, no suspicious skin lesions noted       LABS:    CBC:   WBC   Date Value Ref Range Status   12/26/2017 10.3 3.5 - 11.0 k/uL Final   03/06/2017 8.1 3.5 - 11.0 k/uL Final   11/07/2015 9.8 3.5 - 11.0 k/uL Final     Hemoglobin   Date Value Ref Range Status   12/26/2017 17.4 (H) 13.5 - 17.0 g/dL Final   03/06/2017 17.1 (H) 13.5 - 17.0 g/dL Final   11/07/2015 16.6 13.5 - 17.0 g/dL Final     Platelets   Date Value Ref Range Status   12/26/2017 236 140 - 450 k/uL Final   03/06/2017 222 140 - 450 k/uL Final   11/07/2015 202 140 - 450 k/uL Final     BMP:   Sodium   Date Value Ref Range Status   12/26/2017 136 135 - 144 mmol/L Final   04/05/2017 137 135 - 144 mmol/L Final   03/06/2017 140 135 - 144 mmol/L Final     Potassium   Date Value Ref Range Status   12/26/2017 4.4 3.7 - 5.3 mmol/L Final   04/05/2017 3.7 3.7 - 5.3 mmol/L Final   03/06/2017 4.0 3.7 - 5.3 mmol/L Final     Chloride   Date Value Ref Range Status   12/26/2017 94 (L) 98 - 107 mmol/L Final   04/05/2017 98 98 - 107 mmol/L Final   03/06/2017 97 (L) 98 - 107 mmol/L Final     CO2   Date Value Ref Range Status BID  CT scan sinuses  ENT consult    Up to date with vaccinations from pulm perspective  Annual Flu vaccine, refused last year    Advised 5-6 small meals a day   Home O2 to continue all the time  Refer for  pulmonary rehab but patient cannot get to rehab/ transport and cant get back and forth  Smoking cessation recommended and discussed and counseled today about complete smoking cessation and to stop Marijuana  Alpha 1 antitrypsin was 205    Doubt he would not benefit from low dose Ct chest for screening    RTC 6 months    It was my pleasure to evaluate Krupa Pelaez today. Please call with questions.     Chavo Castro MD             2/22/2018, 12:17 PM

## 2018-02-22 NOTE — PATIENT INSTRUCTIONS
SURVEY:    You may be receiving a survey from [employer] regarding your visit today. Please complete the survey to enable us to provide the highest quality of care to you and your family. If you cannot score us a very good on any question, please call the office to discuss how we could of made your experience a very good one. Thank you. Please recheck your blood pressure when you go home and make sure you take your prescribed medication if applicable . Please follow up with your PCP or ER if needed.

## 2018-02-26 DIAGNOSIS — J44.9 CHRONIC OBSTRUCTIVE PULMONARY DISEASE, UNSPECIFIED COPD TYPE (HCC): ICD-10-CM

## 2018-02-26 RX ORDER — ALBUTEROL SULFATE 90 UG/1
2 AEROSOL, METERED RESPIRATORY (INHALATION) EVERY 6 HOURS PRN
Qty: 1 INHALER | Refills: 3 | Status: SHIPPED | OUTPATIENT
Start: 2018-02-26 | End: 2018-06-12 | Stop reason: SDUPTHER

## 2018-02-26 NOTE — TELEPHONE ENCOUNTER
From: Shari Yadav  Sent: 2/25/2018 11:57 AM EST  Subject: Medication Renewal Request    Peng Aleman Cable would like a refill of the following medications:     albuterol sulfate HFA (VENTOLIN HFA) 108 (90 BASE) MCG/ACT inhaler [Anshu Phelps, SHARON]    Preferred pharmacy: [address] [facility] [address]    Comment:

## 2018-04-03 DIAGNOSIS — J44.9 CHRONIC OBSTRUCTIVE PULMONARY DISEASE, UNSPECIFIED COPD TYPE (HCC): ICD-10-CM

## 2018-04-03 RX ORDER — DILTIAZEM HYDROCHLORIDE 60 MG/1
TABLET, FILM COATED ORAL
Qty: 3 INHALER | Refills: 3 | Status: SHIPPED | OUTPATIENT
Start: 2018-04-03 | End: 2019-01-22 | Stop reason: CLARIF

## 2018-06-09 ENCOUNTER — HOSPITAL ENCOUNTER (INPATIENT)
Age: 53
LOS: 2 days | Discharge: HOME | DRG: 190 | End: 2018-06-11
Attending: EMERGENCY MEDICINE | Admitting: INTERNAL MEDICINE
Payer: MEDICARE

## 2018-06-09 ENCOUNTER — APPOINTMENT (OUTPATIENT)
Dept: GENERAL RADIOLOGY | Age: 53
DRG: 190 | End: 2018-06-09
Payer: MEDICARE

## 2018-06-09 DIAGNOSIS — J44.1 COPD EXACERBATION (HCC): Primary | ICD-10-CM

## 2018-06-09 LAB
ABSOLUTE EOS #: 0.29 K/UL (ref 0–0.44)
ABSOLUTE IMMATURE GRANULOCYTE: <0.03 K/UL (ref 0–0.3)
ABSOLUTE LYMPH #: 1.9 K/UL (ref 1.1–3.7)
ABSOLUTE MONO #: 0.43 K/UL (ref 0.1–1.2)
ALLEN TEST: ABNORMAL
ANION GAP SERPL CALCULATED.3IONS-SCNC: 10 MMOL/L (ref 9–17)
BASOPHILS # BLD: 1 % (ref 0–2)
BASOPHILS ABSOLUTE: 0.08 K/UL (ref 0–0.2)
BNP INTERPRETATION: NORMAL
BUN BLDV-MCNC: 13 MG/DL (ref 6–20)
BUN/CREAT BLD: 17 (ref 9–20)
CALCIUM SERPL-MCNC: 9.1 MG/DL (ref 8.6–10.4)
CARBOXYHEMOGLOBIN: ABNORMAL % (ref 0–5)
CHLORIDE BLD-SCNC: 94 MMOL/L (ref 98–107)
CO2: 34 MMOL/L (ref 20–31)
CREAT SERPL-MCNC: 0.75 MG/DL (ref 0.7–1.2)
DIFFERENTIAL TYPE: NORMAL
EKG ATRIAL RATE: 117 BPM
EKG P AXIS: 87 DEGREES
EKG P-R INTERVAL: 132 MS
EKG Q-T INTERVAL: 306 MS
EKG QRS DURATION: 74 MS
EKG QTC CALCULATION (BAZETT): 426 MS
EKG R AXIS: -65 DEGREES
EKG T AXIS: 85 DEGREES
EKG VENTRICULAR RATE: 117 BPM
EOSINOPHILS RELATIVE PERCENT: 4 % (ref 1–4)
FIO2: 28
GFR AFRICAN AMERICAN: >60 ML/MIN
GFR NON-AFRICAN AMERICAN: >60 ML/MIN
GFR SERPL CREATININE-BSD FRML MDRD: ABNORMAL ML/MIN/{1.73_M2}
GFR SERPL CREATININE-BSD FRML MDRD: ABNORMAL ML/MIN/{1.73_M2}
GLUCOSE BLD-MCNC: 128 MG/DL (ref 70–99)
HCO3 ARTERIAL: 33 MMOL/L (ref 22–26)
HCT VFR BLD CALC: 47 % (ref 40.7–50.3)
HEMOGLOBIN: 15.6 G/DL (ref 13–17)
IMMATURE GRANULOCYTES: 0 %
LYMPHOCYTES # BLD: 28 % (ref 24–43)
MCH RBC QN AUTO: 33.3 PG (ref 25.2–33.5)
MCHC RBC AUTO-ENTMCNC: 33.2 G/DL (ref 28.4–34.8)
MCV RBC AUTO: 100.2 FL (ref 82.6–102.9)
METHEMOGLOBIN: ABNORMAL % (ref 0–1.9)
MODE: ABNORMAL
MONOCYTES # BLD: 6 % (ref 3–12)
NEGATIVE BASE EXCESS, ART: ABNORMAL MMOL/L (ref 0–2)
NOTIFICATION TIME: ABNORMAL
NOTIFICATION: ABNORMAL
NRBC AUTOMATED: 0 PER 100 WBC
O2 DEVICE/FLOW/%: ABNORMAL
O2 SAT, ARTERIAL: 92.6 % (ref 95–98)
OXYHEMOGLOBIN: ABNORMAL % (ref 95–98)
PATIENT TEMP: 37
PCO2 ARTERIAL: 52.9 MMHG (ref 35–45)
PCO2, ART, TEMP ADJ: ABNORMAL (ref 35–45)
PDW BLD-RTO: 12.3 % (ref 11.8–14.4)
PEEP/CPAP: ABNORMAL
PH ARTERIAL: 7.41 (ref 7.35–7.45)
PH, ART, TEMP ADJ: ABNORMAL (ref 7.35–7.45)
PLATELET # BLD: 177 K/UL (ref 138–453)
PLATELET ESTIMATE: NORMAL
PMV BLD AUTO: 9.4 FL (ref 8.1–13.5)
PO2 ARTERIAL: 64.6 MMHG (ref 80–100)
PO2, ART, TEMP ADJ: ABNORMAL MMHG (ref 80–100)
POSITIVE BASE EXCESS, ART: 6.8 MMOL/L (ref 0–2)
POTASSIUM SERPL-SCNC: 4.5 MMOL/L (ref 3.7–5.3)
PRO-BNP: 139 PG/ML
PSV: ABNORMAL
PT. POSITION: ABNORMAL
RBC # BLD: 4.69 M/UL (ref 4.21–5.77)
RBC # BLD: NORMAL 10*6/UL
RESPIRATORY RATE: 26
SAMPLE SITE: ABNORMAL
SEG NEUTROPHILS: 61 % (ref 36–65)
SEGMENTED NEUTROPHILS ABSOLUTE COUNT: 4.01 K/UL (ref 1.5–8.1)
SET RATE: ABNORMAL
SODIUM BLD-SCNC: 138 MMOL/L (ref 135–144)
TEXT FOR RESPIRATORY: ABNORMAL
TOTAL HB: ABNORMAL G/DL (ref 12–16)
TOTAL RATE: ABNORMAL
TROPONIN INTERP: NORMAL
TROPONIN T: <0.03 NG/ML
VT: ABNORMAL
WBC # BLD: 6.7 K/UL (ref 3.5–11.3)
WBC # BLD: NORMAL 10*3/UL

## 2018-06-09 PROCEDURE — 6360000002 HC RX W HCPCS: Performed by: EMERGENCY MEDICINE

## 2018-06-09 PROCEDURE — 84484 ASSAY OF TROPONIN QUANT: CPT

## 2018-06-09 PROCEDURE — 2580000003 HC RX 258: Performed by: EMERGENCY MEDICINE

## 2018-06-09 PROCEDURE — 6360000002 HC RX W HCPCS: Performed by: INTERNAL MEDICINE

## 2018-06-09 PROCEDURE — 94664 DEMO&/EVAL PT USE INHALER: CPT

## 2018-06-09 PROCEDURE — 1200000000 HC SEMI PRIVATE

## 2018-06-09 PROCEDURE — 83880 ASSAY OF NATRIURETIC PEPTIDE: CPT

## 2018-06-09 PROCEDURE — 71045 X-RAY EXAM CHEST 1 VIEW: CPT

## 2018-06-09 PROCEDURE — 80048 BASIC METABOLIC PNL TOTAL CA: CPT

## 2018-06-09 PROCEDURE — 85025 COMPLETE CBC W/AUTO DIFF WBC: CPT

## 2018-06-09 PROCEDURE — 36600 WITHDRAWAL OF ARTERIAL BLOOD: CPT

## 2018-06-09 PROCEDURE — 94640 AIRWAY INHALATION TREATMENT: CPT

## 2018-06-09 PROCEDURE — 36415 COLL VENOUS BLD VENIPUNCTURE: CPT

## 2018-06-09 PROCEDURE — 6370000000 HC RX 637 (ALT 250 FOR IP): Performed by: INTERNAL MEDICINE

## 2018-06-09 PROCEDURE — 99285 EMERGENCY DEPT VISIT HI MDM: CPT

## 2018-06-09 PROCEDURE — 93005 ELECTROCARDIOGRAM TRACING: CPT

## 2018-06-09 PROCEDURE — 82805 BLOOD GASES W/O2 SATURATION: CPT

## 2018-06-09 PROCEDURE — 6370000000 HC RX 637 (ALT 250 FOR IP): Performed by: EMERGENCY MEDICINE

## 2018-06-09 RX ORDER — ALBUTEROL SULFATE 2.5 MG/3ML
2.5 SOLUTION RESPIRATORY (INHALATION) EVERY 4 HOURS PRN
Status: DISCONTINUED | OUTPATIENT
Start: 2018-06-09 | End: 2018-06-11 | Stop reason: HOSPADM

## 2018-06-09 RX ORDER — SODIUM CHLORIDE 0.9 % (FLUSH) 0.9 %
10 SYRINGE (ML) INJECTION EVERY 12 HOURS SCHEDULED
Status: DISCONTINUED | OUTPATIENT
Start: 2018-06-09 | End: 2018-06-11 | Stop reason: HOSPADM

## 2018-06-09 RX ORDER — SODIUM CHLORIDE 0.9 % (FLUSH) 0.9 %
10 SYRINGE (ML) INJECTION PRN
Status: DISCONTINUED | OUTPATIENT
Start: 2018-06-09 | End: 2018-06-11 | Stop reason: HOSPADM

## 2018-06-09 RX ORDER — IPRATROPIUM BROMIDE AND ALBUTEROL SULFATE 2.5; .5 MG/3ML; MG/3ML
1 SOLUTION RESPIRATORY (INHALATION)
Status: DISCONTINUED | OUTPATIENT
Start: 2018-06-09 | End: 2018-06-09

## 2018-06-09 RX ORDER — 0.9 % SODIUM CHLORIDE 0.9 %
500 INTRAVENOUS SOLUTION INTRAVENOUS ONCE
Status: COMPLETED | OUTPATIENT
Start: 2018-06-09 | End: 2018-06-09

## 2018-06-09 RX ORDER — METHYLPREDNISOLONE SODIUM SUCCINATE 125 MG/2ML
125 INJECTION, POWDER, LYOPHILIZED, FOR SOLUTION INTRAMUSCULAR; INTRAVENOUS EVERY 12 HOURS
Status: DISCONTINUED | OUTPATIENT
Start: 2018-06-10 | End: 2018-06-11 | Stop reason: HOSPADM

## 2018-06-09 RX ORDER — DOXYCYCLINE HYCLATE 100 MG/1
100 CAPSULE ORAL EVERY 12 HOURS
Status: DISCONTINUED | OUTPATIENT
Start: 2018-06-09 | End: 2018-06-11 | Stop reason: HOSPADM

## 2018-06-09 RX ORDER — ACETAMINOPHEN 325 MG/1
650 TABLET ORAL EVERY 4 HOURS PRN
Status: DISCONTINUED | OUTPATIENT
Start: 2018-06-09 | End: 2018-06-11 | Stop reason: HOSPADM

## 2018-06-09 RX ORDER — FAMOTIDINE 20 MG/1
20 TABLET, FILM COATED ORAL 2 TIMES DAILY
Status: DISCONTINUED | OUTPATIENT
Start: 2018-06-09 | End: 2018-06-11 | Stop reason: HOSPADM

## 2018-06-09 RX ORDER — IPRATROPIUM BROMIDE AND ALBUTEROL SULFATE 2.5; .5 MG/3ML; MG/3ML
1 SOLUTION RESPIRATORY (INHALATION) 4 TIMES DAILY
Status: DISCONTINUED | OUTPATIENT
Start: 2018-06-10 | End: 2018-06-11 | Stop reason: HOSPADM

## 2018-06-09 RX ORDER — IPRATROPIUM BROMIDE AND ALBUTEROL SULFATE 2.5; .5 MG/3ML; MG/3ML
2 SOLUTION RESPIRATORY (INHALATION) ONCE
Status: COMPLETED | OUTPATIENT
Start: 2018-06-09 | End: 2018-06-09

## 2018-06-09 RX ORDER — LOSARTAN POTASSIUM 50 MG/1
100 TABLET ORAL DAILY
Status: DISCONTINUED | OUTPATIENT
Start: 2018-06-09 | End: 2018-06-11 | Stop reason: HOSPADM

## 2018-06-09 RX ORDER — ONDANSETRON 2 MG/ML
4 INJECTION INTRAMUSCULAR; INTRAVENOUS EVERY 6 HOURS PRN
Status: DISCONTINUED | OUTPATIENT
Start: 2018-06-09 | End: 2018-06-11 | Stop reason: HOSPADM

## 2018-06-09 RX ORDER — ALBUTEROL SULFATE 90 UG/1
2 AEROSOL, METERED RESPIRATORY (INHALATION) EVERY 6 HOURS PRN
Status: DISCONTINUED | OUTPATIENT
Start: 2018-06-09 | End: 2018-06-09

## 2018-06-09 RX ORDER — MAGNESIUM SULFATE IN WATER 40 MG/ML
2 INJECTION, SOLUTION INTRAVENOUS ONCE
Status: COMPLETED | OUTPATIENT
Start: 2018-06-09 | End: 2018-06-09

## 2018-06-09 RX ORDER — METHYLPREDNISOLONE SODIUM SUCCINATE 125 MG/2ML
125 INJECTION, POWDER, LYOPHILIZED, FOR SOLUTION INTRAMUSCULAR; INTRAVENOUS ONCE
Status: COMPLETED | OUTPATIENT
Start: 2018-06-09 | End: 2018-06-09

## 2018-06-09 RX ADMIN — LOSARTAN POTASSIUM 100 MG: 50 TABLET, FILM COATED ORAL at 20:22

## 2018-06-09 RX ADMIN — DOXYCYCLINE HYCLATE 100 MG: 100 CAPSULE, GELATIN COATED ORAL at 20:22

## 2018-06-09 RX ADMIN — METHYLPREDNISOLONE SODIUM SUCCINATE 125 MG: 125 INJECTION, POWDER, FOR SOLUTION INTRAMUSCULAR; INTRAVENOUS at 18:39

## 2018-06-09 RX ADMIN — IPRATROPIUM BROMIDE AND ALBUTEROL SULFATE 2 AMPULE: .5; 3 SOLUTION RESPIRATORY (INHALATION) at 17:29

## 2018-06-09 RX ADMIN — MOMETASONE FUROATE AND FORMOTEROL FUMARATE DIHYDRATE 2 PUFF: 100; 5 AEROSOL RESPIRATORY (INHALATION) at 21:00

## 2018-06-09 RX ADMIN — SODIUM CHLORIDE 500 ML: 9 INJECTION, SOLUTION INTRAVENOUS at 18:39

## 2018-06-09 RX ADMIN — MAGNESIUM SULFATE HEPTAHYDRATE 2 G: 40 INJECTION, SOLUTION INTRAVENOUS at 18:39

## 2018-06-09 RX ADMIN — FAMOTIDINE 20 MG: 20 TABLET ORAL at 20:22

## 2018-06-09 RX ADMIN — IPRATROPIUM BROMIDE AND ALBUTEROL SULFATE 1 AMPULE: .5; 3 SOLUTION RESPIRATORY (INHALATION) at 21:00

## 2018-06-09 RX ADMIN — ENOXAPARIN SODIUM 40 MG: 40 INJECTION SUBCUTANEOUS at 20:22

## 2018-06-09 ASSESSMENT — ENCOUNTER SYMPTOMS
VOMITING: 0
FACIAL SWELLING: 0
CHEST TIGHTNESS: 0
SHORTNESS OF BREATH: 1
NAUSEA: 0
ABDOMINAL PAIN: 0
BACK PAIN: 0
SORE THROAT: 0
DIARRHEA: 0
COUGH: 1
WHEEZING: 0
PHOTOPHOBIA: 0
BLOOD IN STOOL: 0
TROUBLE SWALLOWING: 0
VOICE CHANGE: 0

## 2018-06-09 ASSESSMENT — PAIN DESCRIPTION - PAIN TYPE: TYPE: ACUTE PAIN

## 2018-06-09 ASSESSMENT — PAIN DESCRIPTION - ORIENTATION: ORIENTATION: LEFT

## 2018-06-09 ASSESSMENT — PAIN SCALES - GENERAL
PAINLEVEL_OUTOF10: 4
PAINLEVEL_OUTOF10: 0

## 2018-06-09 ASSESSMENT — PAIN DESCRIPTION - LOCATION: LOCATION: RIB CAGE

## 2018-06-10 LAB
ALBUMIN SERPL-MCNC: 3.5 G/DL (ref 3.5–5.2)
ALBUMIN/GLOBULIN RATIO: 1.3 (ref 1–2.5)
ALP BLD-CCNC: 62 U/L (ref 40–129)
ALT SERPL-CCNC: 36 U/L (ref 5–41)
ANION GAP SERPL CALCULATED.3IONS-SCNC: 8 MMOL/L (ref 9–17)
AST SERPL-CCNC: 33 U/L
BILIRUB SERPL-MCNC: 0.86 MG/DL (ref 0.3–1.2)
BUN BLDV-MCNC: 16 MG/DL (ref 6–20)
BUN/CREAT BLD: 23 (ref 9–20)
CALCIUM SERPL-MCNC: 8.7 MG/DL (ref 8.6–10.4)
CHLORIDE BLD-SCNC: 98 MMOL/L (ref 98–107)
CO2: 31 MMOL/L (ref 20–31)
CREAT SERPL-MCNC: 0.69 MG/DL (ref 0.7–1.2)
GFR AFRICAN AMERICAN: >60 ML/MIN
GFR NON-AFRICAN AMERICAN: >60 ML/MIN
GFR SERPL CREATININE-BSD FRML MDRD: ABNORMAL ML/MIN/{1.73_M2}
GFR SERPL CREATININE-BSD FRML MDRD: ABNORMAL ML/MIN/{1.73_M2}
GLUCOSE BLD-MCNC: 176 MG/DL (ref 70–99)
HCT VFR BLD CALC: 43.8 % (ref 40.7–50.3)
HEMOGLOBIN: 14.1 G/DL (ref 13–17)
MCH RBC QN AUTO: 32.3 PG (ref 25.2–33.5)
MCHC RBC AUTO-ENTMCNC: 32.2 G/DL (ref 28.4–34.8)
MCV RBC AUTO: 100.5 FL (ref 82.6–102.9)
NRBC AUTOMATED: 0 PER 100 WBC
PDW BLD-RTO: 12.1 % (ref 11.8–14.4)
PLATELET # BLD: 158 K/UL (ref 138–453)
PMV BLD AUTO: 9.9 FL (ref 8.1–13.5)
POTASSIUM SERPL-SCNC: 4.9 MMOL/L (ref 3.7–5.3)
RBC # BLD: 4.36 M/UL (ref 4.21–5.77)
SODIUM BLD-SCNC: 137 MMOL/L (ref 135–144)
TOTAL PROTEIN: 6.1 G/DL (ref 6.4–8.3)
WBC # BLD: 2.3 K/UL (ref 3.5–11.3)

## 2018-06-10 PROCEDURE — 6370000000 HC RX 637 (ALT 250 FOR IP): Performed by: INTERNAL MEDICINE

## 2018-06-10 PROCEDURE — 85027 COMPLETE CBC AUTOMATED: CPT

## 2018-06-10 PROCEDURE — 36415 COLL VENOUS BLD VENIPUNCTURE: CPT

## 2018-06-10 PROCEDURE — 94664 DEMO&/EVAL PT USE INHALER: CPT

## 2018-06-10 PROCEDURE — 93005 ELECTROCARDIOGRAM TRACING: CPT

## 2018-06-10 PROCEDURE — 94640 AIRWAY INHALATION TREATMENT: CPT

## 2018-06-10 PROCEDURE — 6360000002 HC RX W HCPCS: Performed by: INTERNAL MEDICINE

## 2018-06-10 PROCEDURE — 94760 N-INVAS EAR/PLS OXIMETRY 1: CPT

## 2018-06-10 PROCEDURE — 80053 COMPREHEN METABOLIC PANEL: CPT

## 2018-06-10 PROCEDURE — 1200000000 HC SEMI PRIVATE

## 2018-06-10 PROCEDURE — 2580000003 HC RX 258: Performed by: INTERNAL MEDICINE

## 2018-06-10 RX ADMIN — IPRATROPIUM BROMIDE AND ALBUTEROL SULFATE 1 AMPULE: .5; 3 SOLUTION RESPIRATORY (INHALATION) at 05:57

## 2018-06-10 RX ADMIN — METHYLPREDNISOLONE SODIUM SUCCINATE 125 MG: 125 INJECTION, POWDER, FOR SOLUTION INTRAMUSCULAR; INTRAVENOUS at 07:09

## 2018-06-10 RX ADMIN — MOMETASONE FUROATE AND FORMOTEROL FUMARATE DIHYDRATE 2 PUFF: 100; 5 AEROSOL RESPIRATORY (INHALATION) at 20:12

## 2018-06-10 RX ADMIN — FAMOTIDINE 20 MG: 20 TABLET ORAL at 09:08

## 2018-06-10 RX ADMIN — FAMOTIDINE 20 MG: 20 TABLET ORAL at 20:04

## 2018-06-10 RX ADMIN — IPRATROPIUM BROMIDE AND ALBUTEROL SULFATE 1 AMPULE: .5; 3 SOLUTION RESPIRATORY (INHALATION) at 20:12

## 2018-06-10 RX ADMIN — IPRATROPIUM BROMIDE AND ALBUTEROL SULFATE 1 AMPULE: .5; 3 SOLUTION RESPIRATORY (INHALATION) at 15:49

## 2018-06-10 RX ADMIN — ENOXAPARIN SODIUM 40 MG: 40 INJECTION SUBCUTANEOUS at 09:08

## 2018-06-10 RX ADMIN — IPRATROPIUM BROMIDE AND ALBUTEROL SULFATE 1 AMPULE: .5; 3 SOLUTION RESPIRATORY (INHALATION) at 10:56

## 2018-06-10 RX ADMIN — DOXYCYCLINE HYCLATE 100 MG: 100 CAPSULE, GELATIN COATED ORAL at 20:04

## 2018-06-10 RX ADMIN — MOMETASONE FUROATE AND FORMOTEROL FUMARATE DIHYDRATE 2 PUFF: 100; 5 AEROSOL RESPIRATORY (INHALATION) at 10:56

## 2018-06-10 RX ADMIN — DOXYCYCLINE HYCLATE 100 MG: 100 CAPSULE, GELATIN COATED ORAL at 09:09

## 2018-06-10 RX ADMIN — Medication 10 ML: at 20:04

## 2018-06-10 RX ADMIN — METHYLPREDNISOLONE SODIUM SUCCINATE 125 MG: 125 INJECTION, POWDER, FOR SOLUTION INTRAMUSCULAR; INTRAVENOUS at 18:35

## 2018-06-10 RX ADMIN — Medication 10 ML: at 07:09

## 2018-06-10 RX ADMIN — LOSARTAN POTASSIUM 100 MG: 50 TABLET, FILM COATED ORAL at 09:09

## 2018-06-10 ASSESSMENT — PAIN SCALES - GENERAL: PAINLEVEL_OUTOF10: 0

## 2018-06-11 VITALS
OXYGEN SATURATION: 95 % | RESPIRATION RATE: 20 BRPM | HEART RATE: 101 BPM | HEIGHT: 64 IN | WEIGHT: 95 LBS | TEMPERATURE: 98.4 F | DIASTOLIC BLOOD PRESSURE: 89 MMHG | SYSTOLIC BLOOD PRESSURE: 139 MMHG | BODY MASS INDEX: 16.22 KG/M2

## 2018-06-11 PROBLEM — J96.11 CHRONIC RESPIRATORY FAILURE WITH HYPOXIA (HCC): Status: ACTIVE | Noted: 2018-06-11

## 2018-06-11 PROBLEM — E43 SEVERE MALNUTRITION (HCC): Chronic | Status: ACTIVE | Noted: 2018-06-11

## 2018-06-11 PROCEDURE — G8987 SELF CARE CURRENT STATUS: HCPCS

## 2018-06-11 PROCEDURE — 6360000002 HC RX W HCPCS: Performed by: INTERNAL MEDICINE

## 2018-06-11 PROCEDURE — 2580000003 HC RX 258: Performed by: INTERNAL MEDICINE

## 2018-06-11 PROCEDURE — 97166 OT EVAL MOD COMPLEX 45 MIN: CPT

## 2018-06-11 PROCEDURE — 6370000000 HC RX 637 (ALT 250 FOR IP): Performed by: INTERNAL MEDICINE

## 2018-06-11 PROCEDURE — G8988 SELF CARE GOAL STATUS: HCPCS

## 2018-06-11 PROCEDURE — 94640 AIRWAY INHALATION TREATMENT: CPT

## 2018-06-11 RX ORDER — DOXYCYCLINE HYCLATE 100 MG/1
100 CAPSULE ORAL EVERY 12 HOURS
Qty: 20 CAPSULE | Refills: 0 | Status: SHIPPED | OUTPATIENT
Start: 2018-06-11 | End: 2018-06-21

## 2018-06-11 RX ORDER — PREDNISONE 10 MG/1
TABLET ORAL
Qty: 30 TABLET | Refills: 0 | Status: ON HOLD | OUTPATIENT
Start: 2018-06-11 | End: 2018-08-20

## 2018-06-11 RX ADMIN — IPRATROPIUM BROMIDE AND ALBUTEROL SULFATE 1 AMPULE: .5; 3 SOLUTION RESPIRATORY (INHALATION) at 10:58

## 2018-06-11 RX ADMIN — METHYLPREDNISOLONE SODIUM SUCCINATE 125 MG: 125 INJECTION, POWDER, FOR SOLUTION INTRAMUSCULAR; INTRAVENOUS at 05:35

## 2018-06-11 RX ADMIN — LOSARTAN POTASSIUM 100 MG: 50 TABLET, FILM COATED ORAL at 08:08

## 2018-06-11 RX ADMIN — MOMETASONE FUROATE AND FORMOTEROL FUMARATE DIHYDRATE 2 PUFF: 100; 5 AEROSOL RESPIRATORY (INHALATION) at 10:58

## 2018-06-11 RX ADMIN — Medication 10 ML: at 08:11

## 2018-06-11 RX ADMIN — ENOXAPARIN SODIUM 40 MG: 40 INJECTION SUBCUTANEOUS at 08:08

## 2018-06-11 RX ADMIN — IPRATROPIUM BROMIDE AND ALBUTEROL SULFATE 1 AMPULE: .5; 3 SOLUTION RESPIRATORY (INHALATION) at 05:49

## 2018-06-11 RX ADMIN — DOXYCYCLINE HYCLATE 100 MG: 100 CAPSULE, GELATIN COATED ORAL at 08:08

## 2018-06-11 RX ADMIN — FAMOTIDINE 20 MG: 20 TABLET ORAL at 08:08

## 2018-06-11 ASSESSMENT — PAIN DESCRIPTION - ORIENTATION
ORIENTATION: LEFT;LOWER
ORIENTATION: LEFT;LOWER

## 2018-06-11 ASSESSMENT — PAIN DESCRIPTION - PAIN TYPE: TYPE: ACUTE PAIN

## 2018-06-11 ASSESSMENT — PAIN DESCRIPTION - DESCRIPTORS: DESCRIPTORS: SORE

## 2018-06-11 ASSESSMENT — PAIN SCALES - GENERAL
PAINLEVEL_OUTOF10: 0
PAINLEVEL_OUTOF10: 4
PAINLEVEL_OUTOF10: 4

## 2018-06-11 ASSESSMENT — PAIN DESCRIPTION - FREQUENCY: FREQUENCY: INTERMITTENT

## 2018-06-11 ASSESSMENT — PAIN DESCRIPTION - LOCATION
LOCATION: RIB CAGE
LOCATION: RIB CAGE

## 2018-06-12 ENCOUNTER — CARE COORDINATION (OUTPATIENT)
Dept: CASE MANAGEMENT | Age: 53
End: 2018-06-12

## 2018-06-12 DIAGNOSIS — J44.9 CHRONIC OBSTRUCTIVE PULMONARY DISEASE, UNSPECIFIED COPD TYPE (HCC): ICD-10-CM

## 2018-06-12 RX ORDER — UMECLIDINIUM 62.5 UG/1
AEROSOL, POWDER ORAL
Qty: 1 EACH | Refills: 11 | Status: SHIPPED | OUTPATIENT
Start: 2018-06-12 | End: 2019-01-22 | Stop reason: CLARIF

## 2018-06-13 ENCOUNTER — TELEPHONE (OUTPATIENT)
Dept: PRIMARY CARE CLINIC | Age: 53
End: 2018-06-13

## 2018-06-21 ENCOUNTER — OFFICE VISIT (OUTPATIENT)
Dept: PRIMARY CARE CLINIC | Age: 53
End: 2018-06-21
Payer: MEDICARE

## 2018-06-21 VITALS
HEART RATE: 127 BPM | WEIGHT: 94.6 LBS | RESPIRATION RATE: 24 BRPM | DIASTOLIC BLOOD PRESSURE: 89 MMHG | BODY MASS INDEX: 16.24 KG/M2 | SYSTOLIC BLOOD PRESSURE: 108 MMHG | OXYGEN SATURATION: 99 % | TEMPERATURE: 98.3 F

## 2018-06-21 DIAGNOSIS — J44.9 COPD, SEVERE (HCC): Primary | ICD-10-CM

## 2018-06-21 DIAGNOSIS — J30.1 ACUTE SEASONAL ALLERGIC RHINITIS DUE TO POLLEN: ICD-10-CM

## 2018-06-21 PROCEDURE — 99495 TRANSJ CARE MGMT MOD F2F 14D: CPT | Performed by: NURSE PRACTITIONER

## 2018-06-21 RX ORDER — PSEUDOEPHEDRINE HCL 120 MG/1
120 TABLET, FILM COATED, EXTENDED RELEASE ORAL EVERY 12 HOURS
COMMUNITY
End: 2018-11-27 | Stop reason: SINTOL

## 2018-06-21 RX ORDER — MONTELUKAST SODIUM 10 MG/1
10 TABLET ORAL DAILY
Qty: 30 TABLET | Refills: 3 | Status: SHIPPED | OUTPATIENT
Start: 2018-06-21 | End: 2019-01-22 | Stop reason: CLARIF

## 2018-06-21 ASSESSMENT — ENCOUNTER SYMPTOMS
COUGH: 1
SHORTNESS OF BREATH: 1
SPUTUM PRODUCTION: 1

## 2018-06-21 ASSESSMENT — PATIENT HEALTH QUESTIONNAIRE - PHQ9
SUM OF ALL RESPONSES TO PHQ9 QUESTIONS 1 & 2: 0
2. FEELING DOWN, DEPRESSED OR HOPELESS: 0
1. LITTLE INTEREST OR PLEASURE IN DOING THINGS: 0
SUM OF ALL RESPONSES TO PHQ QUESTIONS 1-9: 0

## 2018-06-21 ASSESSMENT — COPD QUESTIONNAIRES: COPD: 1

## 2018-08-09 ENCOUNTER — APPOINTMENT (OUTPATIENT)
Dept: GENERAL RADIOLOGY | Age: 53
DRG: 189 | End: 2018-08-09
Payer: MEDICARE

## 2018-08-09 ENCOUNTER — HOSPITAL ENCOUNTER (INPATIENT)
Age: 53
LOS: 11 days | Discharge: NURSING, REHAB OR LONG-TERM CARE | DRG: 189 | End: 2018-08-20
Attending: FAMILY MEDICINE | Admitting: INTERNAL MEDICINE
Payer: MEDICARE

## 2018-08-09 DIAGNOSIS — Z87.09 HISTORY OF COPD: ICD-10-CM

## 2018-08-09 DIAGNOSIS — J96.02 ACUTE RESPIRATORY FAILURE WITH HYPERCAPNIA (HCC): Primary | ICD-10-CM

## 2018-08-09 DIAGNOSIS — F41.9 ANXIETY: ICD-10-CM

## 2018-08-09 DIAGNOSIS — R07.89 LEFT-SIDED CHEST WALL PAIN: ICD-10-CM

## 2018-08-09 DIAGNOSIS — I10 ESSENTIAL HYPERTENSION: ICD-10-CM

## 2018-08-09 PROBLEM — J96.20 ACUTE ON CHRONIC RESPIRATORY FAILURE (HCC): Status: ACTIVE | Noted: 2018-08-09

## 2018-08-09 PROBLEM — J44.9 COPD, SEVERE (HCC): Chronic | Status: ACTIVE | Noted: 2017-03-28

## 2018-08-09 PROBLEM — J96.11 CHRONIC RESPIRATORY FAILURE WITH HYPOXIA (HCC): Chronic | Status: ACTIVE | Noted: 2018-06-11

## 2018-08-09 PROBLEM — E78.5 DYSLIPIDEMIA: Chronic | Status: ACTIVE | Noted: 2017-04-11

## 2018-08-09 LAB
ABSOLUTE EOS #: 0.15 K/UL (ref 0–0.44)
ABSOLUTE IMMATURE GRANULOCYTE: 0.03 K/UL (ref 0–0.3)
ABSOLUTE LYMPH #: 1.47 K/UL (ref 1.1–3.7)
ABSOLUTE MONO #: 0.78 K/UL (ref 0.1–1.2)
ALLEN TEST: ABNORMAL
ANION GAP SERPL CALCULATED.3IONS-SCNC: 7 MMOL/L (ref 9–17)
BASOPHILS # BLD: 0 % (ref 0–2)
BASOPHILS ABSOLUTE: 0.04 K/UL (ref 0–0.2)
BNP INTERPRETATION: NORMAL
BUN BLDV-MCNC: 17 MG/DL (ref 6–20)
BUN/CREAT BLD: 31 (ref 9–20)
CALCIUM SERPL-MCNC: 9.6 MG/DL (ref 8.6–10.4)
CARBOXYHEMOGLOBIN: ABNORMAL % (ref 0–5)
CHLORIDE BLD-SCNC: 93 MMOL/L (ref 98–107)
CO2: 39 MMOL/L (ref 20–31)
CREAT SERPL-MCNC: 0.55 MG/DL (ref 0.7–1.2)
DIFFERENTIAL TYPE: ABNORMAL
EOSINOPHILS RELATIVE PERCENT: 1 % (ref 1–4)
FIO2: ABNORMAL
GFR AFRICAN AMERICAN: >60 ML/MIN
GFR NON-AFRICAN AMERICAN: >60 ML/MIN
GFR SERPL CREATININE-BSD FRML MDRD: ABNORMAL ML/MIN/{1.73_M2}
GFR SERPL CREATININE-BSD FRML MDRD: ABNORMAL ML/MIN/{1.73_M2}
GLUCOSE BLD-MCNC: 139 MG/DL (ref 70–99)
HCO3 ARTERIAL: 36.9 MMOL/L (ref 22–26)
HCT VFR BLD CALC: 41.4 % (ref 40.7–50.3)
HEMOGLOBIN: 13.9 G/DL (ref 13–17)
IMMATURE GRANULOCYTES: 0 %
LACTIC ACID, WHOLE BLOOD: NORMAL MMOL/L (ref 0.7–2.1)
LACTIC ACID: 1 MMOL/L (ref 0.5–2.2)
LYMPHOCYTES # BLD: 13 % (ref 24–43)
MCH RBC QN AUTO: 33.8 PG (ref 25.2–33.5)
MCHC RBC AUTO-ENTMCNC: 33.6 G/DL (ref 28.4–34.8)
MCV RBC AUTO: 100.7 FL (ref 82.6–102.9)
METHEMOGLOBIN: ABNORMAL % (ref 0–1.9)
MODE: ABNORMAL
MONOCYTES # BLD: 7 % (ref 3–12)
NEGATIVE BASE EXCESS, ART: ABNORMAL MMOL/L (ref 0–2)
NOTIFICATION TIME: ABNORMAL
NOTIFICATION: ABNORMAL
NRBC AUTOMATED: 0 PER 100 WBC
O2 DEVICE/FLOW/%: ABNORMAL
O2 SAT, ARTERIAL: 92.4 % (ref 95–98)
OXYHEMOGLOBIN: ABNORMAL % (ref 95–98)
PATIENT TEMP: 37
PCO2 ARTERIAL: 77.4 MMHG (ref 35–45)
PCO2, ART, TEMP ADJ: ABNORMAL
PDW BLD-RTO: 11.9 % (ref 11.8–14.4)
PEEP/CPAP: ABNORMAL
PH ARTERIAL: 7.3 (ref 7.35–7.45)
PH, ART, TEMP ADJ: ABNORMAL (ref 7.35–7.45)
PLATELET # BLD: 182 K/UL (ref 138–453)
PLATELET ESTIMATE: ABNORMAL
PMV BLD AUTO: 9.8 FL (ref 8.1–13.5)
PO2 ARTERIAL: 73.1 MMHG (ref 80–100)
PO2, ART, TEMP ADJ: ABNORMAL MMHG (ref 80–100)
POSITIVE BASE EXCESS, ART: 7.2 MMOL/L (ref 0–2)
POTASSIUM SERPL-SCNC: 4.5 MMOL/L (ref 3.7–5.3)
PRO-BNP: 125 PG/ML
PSV: ABNORMAL
PT. POSITION: ABNORMAL
RBC # BLD: 4.11 M/UL (ref 4.21–5.77)
RBC # BLD: ABNORMAL 10*6/UL
RESPIRATORY RATE: ABNORMAL
SAMPLE SITE: ABNORMAL
SEG NEUTROPHILS: 79 % (ref 36–65)
SEGMENTED NEUTROPHILS ABSOLUTE COUNT: 8.56 K/UL (ref 1.5–8.1)
SET RATE: ABNORMAL
SODIUM BLD-SCNC: 139 MMOL/L (ref 135–144)
TEXT FOR RESPIRATORY: ABNORMAL
TOTAL HB: ABNORMAL G/DL (ref 12–16)
TOTAL RATE: ABNORMAL
TROPONIN INTERP: NORMAL
TROPONIN T: <0.03 NG/ML
VT: ABNORMAL
WBC # BLD: 11 K/UL (ref 3.5–11.3)
WBC # BLD: ABNORMAL 10*3/UL

## 2018-08-09 PROCEDURE — 6370000000 HC RX 637 (ALT 250 FOR IP): Performed by: FAMILY MEDICINE

## 2018-08-09 PROCEDURE — 2580000003 HC RX 258: Performed by: INTERNAL MEDICINE

## 2018-08-09 PROCEDURE — 71101 X-RAY EXAM UNILAT RIBS/CHEST: CPT

## 2018-08-09 PROCEDURE — 80048 BASIC METABOLIC PNL TOTAL CA: CPT

## 2018-08-09 PROCEDURE — 85025 COMPLETE CBC W/AUTO DIFF WBC: CPT

## 2018-08-09 PROCEDURE — 96374 THER/PROPH/DIAG INJ IV PUSH: CPT

## 2018-08-09 PROCEDURE — 94640 AIRWAY INHALATION TREATMENT: CPT

## 2018-08-09 PROCEDURE — 36600 WITHDRAWAL OF ARTERIAL BLOOD: CPT

## 2018-08-09 PROCEDURE — 94664 DEMO&/EVAL PT USE INHALER: CPT

## 2018-08-09 PROCEDURE — 83605 ASSAY OF LACTIC ACID: CPT

## 2018-08-09 PROCEDURE — 94660 CPAP INITIATION&MGMT: CPT

## 2018-08-09 PROCEDURE — 6360000002 HC RX W HCPCS: Performed by: FAMILY MEDICINE

## 2018-08-09 PROCEDURE — 99285 EMERGENCY DEPT VISIT HI MDM: CPT

## 2018-08-09 PROCEDURE — 96375 TX/PRO/DX INJ NEW DRUG ADDON: CPT

## 2018-08-09 PROCEDURE — 6370000000 HC RX 637 (ALT 250 FOR IP): Performed by: INTERNAL MEDICINE

## 2018-08-09 PROCEDURE — 93005 ELECTROCARDIOGRAM TRACING: CPT

## 2018-08-09 PROCEDURE — 84484 ASSAY OF TROPONIN QUANT: CPT

## 2018-08-09 PROCEDURE — 83880 ASSAY OF NATRIURETIC PEPTIDE: CPT

## 2018-08-09 PROCEDURE — 82805 BLOOD GASES W/O2 SATURATION: CPT

## 2018-08-09 PROCEDURE — 2000000000 HC ICU R&B

## 2018-08-09 RX ORDER — NICOTINE 21 MG/24HR
1 PATCH, TRANSDERMAL 24 HOURS TRANSDERMAL EVERY 24 HOURS
Status: DISCONTINUED | OUTPATIENT
Start: 2018-08-09 | End: 2018-08-20 | Stop reason: HOSPADM

## 2018-08-09 RX ORDER — FAMOTIDINE 20 MG/1
20 TABLET, FILM COATED ORAL 2 TIMES DAILY
Status: DISCONTINUED | OUTPATIENT
Start: 2018-08-09 | End: 2018-08-20 | Stop reason: HOSPADM

## 2018-08-09 RX ORDER — LOSARTAN POTASSIUM 50 MG/1
100 TABLET ORAL DAILY
Status: DISCONTINUED | OUTPATIENT
Start: 2018-08-10 | End: 2018-08-15

## 2018-08-09 RX ORDER — SODIUM CHLORIDE 9 MG/ML
INJECTION, SOLUTION INTRAVENOUS CONTINUOUS
Status: DISCONTINUED | OUTPATIENT
Start: 2018-08-09 | End: 2018-08-15

## 2018-08-09 RX ORDER — DOXYCYCLINE HYCLATE 100 MG/1
100 CAPSULE ORAL EVERY 12 HOURS
Status: DISCONTINUED | OUTPATIENT
Start: 2018-08-09 | End: 2018-08-17

## 2018-08-09 RX ORDER — IPRATROPIUM BROMIDE AND ALBUTEROL SULFATE 2.5; .5 MG/3ML; MG/3ML
1 SOLUTION RESPIRATORY (INHALATION) ONCE
Status: COMPLETED | OUTPATIENT
Start: 2018-08-09 | End: 2018-08-09

## 2018-08-09 RX ORDER — SODIUM CHLORIDE 0.9 % (FLUSH) 0.9 %
10 SYRINGE (ML) INJECTION PRN
Status: DISCONTINUED | OUTPATIENT
Start: 2018-08-09 | End: 2018-08-20 | Stop reason: HOSPADM

## 2018-08-09 RX ORDER — METHYLPREDNISOLONE SODIUM SUCCINATE 125 MG/2ML
60 INJECTION, POWDER, LYOPHILIZED, FOR SOLUTION INTRAMUSCULAR; INTRAVENOUS EVERY 12 HOURS
Status: DISCONTINUED | OUTPATIENT
Start: 2018-08-09 | End: 2018-08-12

## 2018-08-09 RX ORDER — SODIUM CHLORIDE 0.9 % (FLUSH) 0.9 %
10 SYRINGE (ML) INJECTION EVERY 12 HOURS SCHEDULED
Status: DISCONTINUED | OUTPATIENT
Start: 2018-08-09 | End: 2018-08-20 | Stop reason: HOSPADM

## 2018-08-09 RX ORDER — LORAZEPAM 2 MG/ML
1 INJECTION INTRAMUSCULAR ONCE
Status: COMPLETED | OUTPATIENT
Start: 2018-08-09 | End: 2018-08-09

## 2018-08-09 RX ORDER — ALBUTEROL SULFATE 2.5 MG/3ML
2.5 SOLUTION RESPIRATORY (INHALATION) EVERY 6 HOURS PRN
Status: DISCONTINUED | OUTPATIENT
Start: 2018-08-09 | End: 2018-08-10

## 2018-08-09 RX ORDER — ONDANSETRON 2 MG/ML
4 INJECTION INTRAMUSCULAR; INTRAVENOUS EVERY 6 HOURS PRN
Status: DISCONTINUED | OUTPATIENT
Start: 2018-08-09 | End: 2018-08-20 | Stop reason: HOSPADM

## 2018-08-09 RX ORDER — PROMETHAZINE HYDROCHLORIDE AND CODEINE PHOSPHATE 6.25; 1 MG/5ML; MG/5ML
5 SYRUP ORAL ONCE
Status: COMPLETED | OUTPATIENT
Start: 2018-08-09 | End: 2018-08-09

## 2018-08-09 RX ORDER — METHYLPREDNISOLONE SODIUM SUCCINATE 125 MG/2ML
125 INJECTION, POWDER, LYOPHILIZED, FOR SOLUTION INTRAMUSCULAR; INTRAVENOUS ONCE
Status: COMPLETED | OUTPATIENT
Start: 2018-08-09 | End: 2018-08-09

## 2018-08-09 RX ORDER — IPRATROPIUM BROMIDE AND ALBUTEROL SULFATE 2.5; .5 MG/3ML; MG/3ML
1 SOLUTION RESPIRATORY (INHALATION)
Status: DISCONTINUED | OUTPATIENT
Start: 2018-08-10 | End: 2018-08-10

## 2018-08-09 RX ORDER — MONTELUKAST SODIUM 10 MG/1
10 TABLET ORAL DAILY
Status: DISCONTINUED | OUTPATIENT
Start: 2018-08-10 | End: 2018-08-20 | Stop reason: HOSPADM

## 2018-08-09 RX ADMIN — LORAZEPAM 1 MG: 2 INJECTION INTRAMUSCULAR; INTRAVENOUS at 21:38

## 2018-08-09 RX ADMIN — Medication 5 ML: at 19:25

## 2018-08-09 RX ADMIN — IPRATROPIUM BROMIDE AND ALBUTEROL SULFATE 1 AMPULE: .5; 3 SOLUTION RESPIRATORY (INHALATION) at 19:33

## 2018-08-09 RX ADMIN — SODIUM CHLORIDE: 9 INJECTION, SOLUTION INTRAVENOUS at 22:59

## 2018-08-09 RX ADMIN — FAMOTIDINE 20 MG: 20 TABLET ORAL at 23:09

## 2018-08-09 RX ADMIN — METHYLPREDNISOLONE SODIUM SUCCINATE 125 MG: 125 INJECTION, POWDER, FOR SOLUTION INTRAMUSCULAR; INTRAVENOUS at 19:27

## 2018-08-09 RX ADMIN — DOXYCYCLINE HYCLATE 100 MG: 100 CAPSULE, GELATIN COATED ORAL at 23:09

## 2018-08-09 RX ADMIN — IPRATROPIUM BROMIDE AND ALBUTEROL SULFATE 1 AMPULE: .5; 3 SOLUTION RESPIRATORY (INHALATION) at 21:05

## 2018-08-09 ASSESSMENT — PAIN SCALES - GENERAL
PAINLEVEL_OUTOF10: 10

## 2018-08-09 ASSESSMENT — PAIN DESCRIPTION - PROGRESSION: CLINICAL_PROGRESSION: GRADUALLY WORSENING

## 2018-08-09 ASSESSMENT — PAIN DESCRIPTION - PAIN TYPE
TYPE: ACUTE PAIN;CHRONIC PAIN
TYPE: ACUTE PAIN
TYPE: ACUTE PAIN;CHRONIC PAIN

## 2018-08-09 ASSESSMENT — PAIN DESCRIPTION - ORIENTATION
ORIENTATION: LEFT

## 2018-08-09 ASSESSMENT — PAIN DESCRIPTION - DESCRIPTORS: DESCRIPTORS: ACHING

## 2018-08-09 ASSESSMENT — PAIN DESCRIPTION - LOCATION
LOCATION: RIB CAGE

## 2018-08-09 ASSESSMENT — PAIN DESCRIPTION - ONSET: ONSET: ON-GOING

## 2018-08-09 ASSESSMENT — PAIN DESCRIPTION - FREQUENCY: FREQUENCY: CONTINUOUS

## 2018-08-10 PROBLEM — Z72.0 TOBACCO ABUSE: Status: ACTIVE | Noted: 2018-08-10

## 2018-08-10 PROBLEM — Z72.0 TOBACCO ABUSE: Chronic | Status: ACTIVE | Noted: 2018-08-10

## 2018-08-10 LAB
ABSOLUTE EOS #: 0 K/UL (ref 0–0.44)
ABSOLUTE IMMATURE GRANULOCYTE: 0 K/UL (ref 0–0.3)
ABSOLUTE LYMPH #: 0.65 K/UL (ref 1.1–3.7)
ABSOLUTE MONO #: 0.1 K/UL (ref 0.1–1.2)
ALLEN TEST: ABNORMAL
ANION GAP SERPL CALCULATED.3IONS-SCNC: 6 MMOL/L (ref 9–17)
BASOPHILS # BLD: 0 % (ref 0–2)
BASOPHILS ABSOLUTE: 0 K/UL (ref 0–0.2)
BUN BLDV-MCNC: 17 MG/DL (ref 6–20)
BUN/CREAT BLD: 28 (ref 9–20)
CALCIUM SERPL-MCNC: 9.5 MG/DL (ref 8.6–10.4)
CARBOXYHEMOGLOBIN: ABNORMAL % (ref 0–5)
CHLORIDE BLD-SCNC: 93 MMOL/L (ref 98–107)
CO2: 40 MMOL/L (ref 20–31)
CREAT SERPL-MCNC: 0.6 MG/DL (ref 0.7–1.2)
DIFFERENTIAL TYPE: ABNORMAL
EKG ATRIAL RATE: 123 BPM
EKG P AXIS: 84 DEGREES
EKG P-R INTERVAL: 130 MS
EKG Q-T INTERVAL: 300 MS
EKG QRS DURATION: 74 MS
EKG QTC CALCULATION (BAZETT): 429 MS
EKG R AXIS: -21 DEGREES
EKG T AXIS: 79 DEGREES
EKG VENTRICULAR RATE: 123 BPM
EOSINOPHILS RELATIVE PERCENT: 0 % (ref 1–4)
FIO2: ABNORMAL
GFR AFRICAN AMERICAN: >60 ML/MIN
GFR NON-AFRICAN AMERICAN: >60 ML/MIN
GFR SERPL CREATININE-BSD FRML MDRD: ABNORMAL ML/MIN/{1.73_M2}
GFR SERPL CREATININE-BSD FRML MDRD: ABNORMAL ML/MIN/{1.73_M2}
GLUCOSE BLD-MCNC: 134 MG/DL (ref 70–99)
HCO3 ARTERIAL: 35.1 MMOL/L (ref 22–26)
HCT VFR BLD CALC: 39.2 % (ref 40.7–50.3)
HEMOGLOBIN: 12.9 G/DL (ref 13–17)
IMMATURE GRANULOCYTES: 0 %
LYMPHOCYTES # BLD: 19 % (ref 24–43)
MCH RBC QN AUTO: 33 PG (ref 25.2–33.5)
MCHC RBC AUTO-ENTMCNC: 32.9 G/DL (ref 28.4–34.8)
MCV RBC AUTO: 100.3 FL (ref 82.6–102.9)
METHEMOGLOBIN: ABNORMAL % (ref 0–1.9)
MODE: ABNORMAL
MONOCYTES # BLD: 3 % (ref 3–12)
MORPHOLOGY: NORMAL
NEGATIVE BASE EXCESS, ART: ABNORMAL MMOL/L (ref 0–2)
NOTIFICATION TIME: ABNORMAL
NOTIFICATION: ABNORMAL
NRBC AUTOMATED: 0 PER 100 WBC
O2 DEVICE/FLOW/%: ABNORMAL
O2 SAT, ARTERIAL: 94.7 % (ref 95–98)
OXYHEMOGLOBIN: ABNORMAL % (ref 95–98)
PATIENT TEMP: 37
PCO2 ARTERIAL: 58.9 MMHG (ref 35–45)
PCO2, ART, TEMP ADJ: ABNORMAL
PDW BLD-RTO: 11.9 % (ref 11.8–14.4)
PEEP/CPAP: ABNORMAL
PH ARTERIAL: 7.39 (ref 7.35–7.45)
PH, ART, TEMP ADJ: ABNORMAL (ref 7.35–7.45)
PLATELET # BLD: 148 K/UL (ref 138–453)
PLATELET ESTIMATE: ABNORMAL
PMV BLD AUTO: 10 FL (ref 8.1–13.5)
PO2 ARTERIAL: 75.2 MMHG (ref 80–100)
PO2, ART, TEMP ADJ: ABNORMAL MMHG (ref 80–100)
POSITIVE BASE EXCESS, ART: 8 MMOL/L (ref 0–2)
POTASSIUM SERPL-SCNC: 4.8 MMOL/L (ref 3.7–5.3)
PSV: ABNORMAL
PT. POSITION: ABNORMAL
RBC # BLD: 3.91 M/UL (ref 4.21–5.77)
RBC # BLD: ABNORMAL 10*6/UL
RESPIRATORY RATE: ABNORMAL
SAMPLE SITE: ABNORMAL
SEG NEUTROPHILS: 78 % (ref 36–65)
SEGMENTED NEUTROPHILS ABSOLUTE COUNT: 2.65 K/UL (ref 1.5–8.1)
SET RATE: ABNORMAL
SODIUM BLD-SCNC: 139 MMOL/L (ref 135–144)
TEXT FOR RESPIRATORY: ABNORMAL
TOTAL HB: ABNORMAL G/DL (ref 12–16)
TOTAL RATE: ABNORMAL
VT: ABNORMAL
WBC # BLD: 3.4 K/UL (ref 3.5–11.3)
WBC # BLD: ABNORMAL 10*3/UL

## 2018-08-10 PROCEDURE — 6360000002 HC RX W HCPCS: Performed by: INTERNAL MEDICINE

## 2018-08-10 PROCEDURE — 2000000000 HC ICU R&B

## 2018-08-10 PROCEDURE — 97110 THERAPEUTIC EXERCISES: CPT

## 2018-08-10 PROCEDURE — 80048 BASIC METABOLIC PNL TOTAL CA: CPT

## 2018-08-10 PROCEDURE — 97535 SELF CARE MNGMENT TRAINING: CPT

## 2018-08-10 PROCEDURE — 6360000002 HC RX W HCPCS: Performed by: PHYSICIAN ASSISTANT

## 2018-08-10 PROCEDURE — 6370000000 HC RX 637 (ALT 250 FOR IP): Performed by: INTERNAL MEDICINE

## 2018-08-10 PROCEDURE — 2580000003 HC RX 258: Performed by: INTERNAL MEDICINE

## 2018-08-10 PROCEDURE — 97162 PT EVAL MOD COMPLEX 30 MIN: CPT

## 2018-08-10 PROCEDURE — 36415 COLL VENOUS BLD VENIPUNCTURE: CPT

## 2018-08-10 PROCEDURE — G8979 MOBILITY GOAL STATUS: HCPCS

## 2018-08-10 PROCEDURE — G8988 SELF CARE GOAL STATUS: HCPCS

## 2018-08-10 PROCEDURE — 82805 BLOOD GASES W/O2 SATURATION: CPT

## 2018-08-10 PROCEDURE — 36600 WITHDRAWAL OF ARTERIAL BLOOD: CPT

## 2018-08-10 PROCEDURE — G8978 MOBILITY CURRENT STATUS: HCPCS

## 2018-08-10 PROCEDURE — 94660 CPAP INITIATION&MGMT: CPT

## 2018-08-10 PROCEDURE — 97530 THERAPEUTIC ACTIVITIES: CPT

## 2018-08-10 PROCEDURE — 94640 AIRWAY INHALATION TREATMENT: CPT

## 2018-08-10 PROCEDURE — 6370000000 HC RX 637 (ALT 250 FOR IP): Performed by: FAMILY MEDICINE

## 2018-08-10 PROCEDURE — 94664 DEMO&/EVAL PT USE INHALER: CPT

## 2018-08-10 PROCEDURE — 97166 OT EVAL MOD COMPLEX 45 MIN: CPT

## 2018-08-10 PROCEDURE — 85025 COMPLETE CBC W/AUTO DIFF WBC: CPT

## 2018-08-10 PROCEDURE — G8987 SELF CARE CURRENT STATUS: HCPCS

## 2018-08-10 RX ORDER — KETOROLAC TROMETHAMINE 15 MG/ML
15 INJECTION, SOLUTION INTRAMUSCULAR; INTRAVENOUS EVERY 6 HOURS PRN
Status: DISPENSED | OUTPATIENT
Start: 2018-08-10 | End: 2018-08-13

## 2018-08-10 RX ORDER — IBUPROFEN 400 MG/1
400 TABLET ORAL EVERY 6 HOURS PRN
Status: DISCONTINUED | OUTPATIENT
Start: 2018-08-10 | End: 2018-08-10

## 2018-08-10 RX ORDER — PROMETHAZINE HYDROCHLORIDE AND CODEINE PHOSPHATE 6.25; 1 MG/5ML; MG/5ML
5 SYRUP ORAL EVERY 6 HOURS PRN
Status: DISCONTINUED | OUTPATIENT
Start: 2018-08-10 | End: 2018-08-20 | Stop reason: HOSPADM

## 2018-08-10 RX ORDER — IPRATROPIUM BROMIDE AND ALBUTEROL SULFATE 2.5; .5 MG/3ML; MG/3ML
1 SOLUTION RESPIRATORY (INHALATION) 4 TIMES DAILY
Status: DISCONTINUED | OUTPATIENT
Start: 2018-08-10 | End: 2018-08-20

## 2018-08-10 RX ORDER — ALBUTEROL SULFATE 2.5 MG/3ML
2.5 SOLUTION RESPIRATORY (INHALATION) EVERY 4 HOURS PRN
Status: DISCONTINUED | OUTPATIENT
Start: 2018-08-10 | End: 2018-08-20 | Stop reason: HOSPADM

## 2018-08-10 RX ADMIN — MOMETASONE FUROATE AND FORMOTEROL FUMARATE DIHYDRATE 2 PUFF: 200; 5 AEROSOL RESPIRATORY (INHALATION) at 10:10

## 2018-08-10 RX ADMIN — Medication 5 ML: at 19:43

## 2018-08-10 RX ADMIN — METHYLPREDNISOLONE SODIUM SUCCINATE 60 MG: 125 INJECTION, POWDER, FOR SOLUTION INTRAMUSCULAR; INTRAVENOUS at 07:07

## 2018-08-10 RX ADMIN — IPRATROPIUM BROMIDE AND ALBUTEROL SULFATE 1 AMPULE: .5; 3 SOLUTION RESPIRATORY (INHALATION) at 10:08

## 2018-08-10 RX ADMIN — IPRATROPIUM BROMIDE AND ALBUTEROL SULFATE 1 AMPULE: .5; 3 SOLUTION RESPIRATORY (INHALATION) at 15:49

## 2018-08-10 RX ADMIN — SODIUM CHLORIDE: 9 INJECTION, SOLUTION INTRAVENOUS at 11:59

## 2018-08-10 RX ADMIN — MOMETASONE FUROATE AND FORMOTEROL FUMARATE DIHYDRATE 2 PUFF: 200; 5 AEROSOL RESPIRATORY (INHALATION) at 20:46

## 2018-08-10 RX ADMIN — FAMOTIDINE 20 MG: 20 TABLET ORAL at 19:43

## 2018-08-10 RX ADMIN — METHYLPREDNISOLONE SODIUM SUCCINATE 60 MG: 125 INJECTION, POWDER, FOR SOLUTION INTRAMUSCULAR; INTRAVENOUS at 19:06

## 2018-08-10 RX ADMIN — DOXYCYCLINE HYCLATE 100 MG: 100 CAPSULE, GELATIN COATED ORAL at 11:06

## 2018-08-10 RX ADMIN — ENOXAPARIN SODIUM 40 MG: 40 INJECTION, SOLUTION INTRAVENOUS; SUBCUTANEOUS at 08:06

## 2018-08-10 RX ADMIN — SODIUM CHLORIDE: 9 INJECTION, SOLUTION INTRAVENOUS at 23:25

## 2018-08-10 RX ADMIN — KETOROLAC TROMETHAMINE 15 MG: 15 INJECTION, SOLUTION INTRAMUSCULAR; INTRAVENOUS at 19:11

## 2018-08-10 RX ADMIN — MONTELUKAST SODIUM 10 MG: 10 TABLET, FILM COATED ORAL at 08:06

## 2018-08-10 RX ADMIN — IPRATROPIUM BROMIDE AND ALBUTEROL SULFATE 1 AMPULE: .5; 3 SOLUTION RESPIRATORY (INHALATION) at 05:56

## 2018-08-10 RX ADMIN — DOXYCYCLINE HYCLATE 100 MG: 100 CAPSULE, GELATIN COATED ORAL at 23:25

## 2018-08-10 RX ADMIN — FAMOTIDINE 20 MG: 20 TABLET ORAL at 08:06

## 2018-08-10 RX ADMIN — KETOROLAC TROMETHAMINE 15 MG: 15 INJECTION, SOLUTION INTRAMUSCULAR; INTRAVENOUS at 12:17

## 2018-08-10 RX ADMIN — IPRATROPIUM BROMIDE AND ALBUTEROL SULFATE 1 AMPULE: .5; 3 SOLUTION RESPIRATORY (INHALATION) at 20:46

## 2018-08-10 RX ADMIN — LOSARTAN POTASSIUM 100 MG: 50 TABLET ORAL at 08:06

## 2018-08-10 ASSESSMENT — PAIN DESCRIPTION - LOCATION
LOCATION: RIB CAGE
LOCATION: RIB CAGE
LOCATION: CHEST
LOCATION: CHEST;RIB CAGE;NECK
LOCATION: CHEST
LOCATION: CHEST;RIB CAGE

## 2018-08-10 ASSESSMENT — PAIN DESCRIPTION - PAIN TYPE
TYPE: ACUTE PAIN
TYPE: ACUTE PAIN
TYPE: ACUTE PAIN;CHRONIC PAIN
TYPE: ACUTE PAIN

## 2018-08-10 ASSESSMENT — PAIN DESCRIPTION - DESCRIPTORS
DESCRIPTORS: SHARP
DESCRIPTORS: SHARP
DESCRIPTORS: SHARP;SHOOTING;DULL
DESCRIPTORS: SHARP
DESCRIPTORS: SHARP
DESCRIPTORS: ACHING;SHARP

## 2018-08-10 ASSESSMENT — PAIN DESCRIPTION - ORIENTATION
ORIENTATION: LEFT;LOWER;OTHER (COMMENT)
ORIENTATION: LEFT;OTHER (COMMENT)
ORIENTATION: LEFT

## 2018-08-10 ASSESSMENT — PAIN SCALES - GENERAL
PAINLEVEL_OUTOF10: 3
PAINLEVEL_OUTOF10: 10
PAINLEVEL_OUTOF10: 8
PAINLEVEL_OUTOF10: 10
PAINLEVEL_OUTOF10: 8
PAINLEVEL_OUTOF10: 9

## 2018-08-10 ASSESSMENT — PAIN DESCRIPTION - ONSET
ONSET: ON-GOING
ONSET: OTHER (COMMENT)

## 2018-08-10 ASSESSMENT — PAIN DESCRIPTION - FREQUENCY
FREQUENCY: CONTINUOUS
FREQUENCY: INTERMITTENT
FREQUENCY: CONTINUOUS
FREQUENCY: INTERMITTENT

## 2018-08-10 ASSESSMENT — PAIN DESCRIPTION - PROGRESSION: CLINICAL_PROGRESSION: NOT CHANGED

## 2018-08-10 NOTE — PROGRESS NOTES
Discussed discharge plans with the patient. Patient is a 48year old male here with  Acute on chronic respiratory failure . He is alert and oriented. Patient lives at home with his girlfriend. He uses oxygen ad a showerchair. The girlfriend does the cleaning and he helps with the cooking. Patient manages his own medication and his girlfriend does the driving. His PCP is Kole Phelps CNP. He has medical insurance that helps with medication costs. The discharge plan is pending. Patient may need home health or hospice. Will know closer to discharge.     [address]

## 2018-08-10 NOTE — PROGRESS NOTES
Patient brought from ER on cart. Patient very drowsy at this time and accompanied by fiance. Patient came in to ER for shortness of breath and left rib pain. Navigator being completed, assessment, and vitals. BIPAP set up at this time. Settings are 14/7 at 40%.

## 2018-08-10 NOTE — PROGRESS NOTES
Nutrition Assessment    Type and Reason for Visit: Initial, Positive Nutrition Screen    Nutrition Recommendations:  Avoid carbonated beverages. Malnutrition Assessment:  · Malnutrition Status: Meets the criteria for moderate malnutrition  · Context: Acute illness or injury  · Findings of the 6 clinical characteristics of malnutrition (Minimum of 2 out of 6 clinical characteristics is required to make the diagnosis of moderate or severe Protein Calorie Malnutrition based on AND/ASPEN Guidelines):  1. Energy Intake-Less than or equal to 50%, greater than or equal to 1 month    2. Weight Loss-10% loss or greater, in 1 year  3. Fat Loss-Unable to assess,    4. Muscle Loss-Moderate muscle mass loss, Clavicles (pectoralis and deltoids)  5. Fluid Accumulation-No significant fluid accumulation,    6.  Strength-Not measured    Nutrition Diagnosis:   · Problem: Increased nutrient needs  · Etiology: related to Impaired respiratory function-inability to consume food     Signs and symptoms:  as evidenced by Diet history of poor intake    Nutrition Assessment:  · Subjective Assessment: SOB with meals. Using some Boost high calorie at home (up to 4 a day).   No teeth  · Nutrition-Focused Physical Findings: underweight  · Wound Type: None  · Current Nutrition Therapies:  · Oral Diet Orders: General   · Oral Diet intake: Unable to assess (No record yet available)  · Oral Nutrition Supplement (ONS) Orders: Standard High Calorie Oral Supplement (adding two edison today)  · Anthropometric Measures:  · Ht: 5' 6\" (167.6 cm)   · Current Body Wt: 98 lb (44.5 kg)  · Admission Body Wt: 95 lb (43.1 kg)  · Usual Body Wt: 112 lb (50.8 kg) (one year ago)  · % Weight Change: 15.2% loss,  one year  · Ideal Body Wt: 142 lb (64.4 kg), % Ideal Body 69%  · BMI Classification: BMI <18.5 Underweight  · Comparative Standards (Estimated Nutrition Needs):  · Estimated Daily Total Kcal: 7740-5564 (likely higher needs)  · Estimated Daily Protein (g):

## 2018-08-10 NOTE — PROGRESS NOTES
RESPIRATORY ASSESSMENT PROTOCOL                                                                                              Patient Name: Sammi Thornton Room#: Y261/G839-05 : 1965     Admitting diagnosis: Acute on chronic respiratory failure (HCC) [J96.20]  Acute on chronic respiratory failure ([facility] [J96.20]       Medical History:   Past Medical History:   Diagnosis Date    Arthritis     COPD (chronic obstructive pulmonary disease) ([facility]     Hypertension     Scoliosis        PATIENT ASSESSMENT    LABORATORY DATA  Hematology:   Lab Results   Component Value Date    WBC 11.0 2018    RBC 4.11 2018    HGB 13.9 2018    HCT 41.4 2018     2018     Chemistry:    Lab Results   Component Value Date    PHART 7.296 2018    UBD5IGK 77.4 2018    PO2ART 73.1 2018    Z9GNVINX 92.4 2018    MRQ4VBI 36.9 2018    PBEA 7.2 2018       Blood Culture:   Sputum Culture:     VITALS  Pulse: 118   Resp: (!) 34  BP: 105/79  SpO2: 97 % O2 Device: Bi-PAP  Temp: 97.4 °F (36.3 °C)  Comment:     SKIN COLOR  [x] Normal  [] Pale  [] Dusky  [] Cyanotic      RESPIRATORY PATTERN  [] Normal  [x] Dyspnea  [] Cheyne-Lawrence  [] Kussmaul  [] Biots    AMBULATORY  [] Yes  [] No  [x] With Assistance    PEAK FLOW  Predicted:     Personal Best:        VITAL CAPACITY  Predicted value:  ml  Actual Value:  ml  30% of Predicted:  Ml    Patient Acuity 0 1 2 3 4 Score   Level of Concious (LOC) [x]  Alert & Oriented or Pt normal LOC []  Confused;follows directions []  Confused & uncooper-ative []  Obtunded []  Comatose 0   Respiratory Rate  (RR) []  Reg. rate & pattern. 12 - 20 bpm  []  Increased RR. Greater than 20 bpm   []  SOB w/ exertion or RR greater than 24 bpm []  Access- ory muscle use at rest. Abn.  resp.  [x]  SOB at rest.   4   Bilateral Breath Sounds (BBS) []  Clear []  Diminish-ed bases  [x]  Diminish-ed t/o, or rales   []  Sporadic, scattered wheezes or rhonchi

## 2018-08-10 NOTE — H&P
extended release tablet Take 120 mg by mouth every 12 hours   Yes Historical Provider, MD   montelukast (SINGULAIR) 10 MG tablet Take 1 tablet by mouth daily 6/21/18  Yes ABHIJEET Yanez CNP   nicotine (NICODERM CQ) 21 MG/24HR Place 1 patch onto the skin every 24 hours 6/13/18  Yes ABHIJEET Yanez CNP   VENTOLIN  (90 Base) MCG/ACT inhaler INHALE 2 PUFFS BY MOUTH EVERY 6 HOURS AS NEEDED FOR WHEEZING 6/12/18  Yes ABHIJEET Yanez CNP   INCRUSE ELLIPTA 62.5 MCG/INH AEPB INHALE ONE PUFF INTO THE LUNGS ONCE DAILY 6/12/18  Yes Jori Moreira MD   predniSONE (DELTASONE) 10 MG tablet 4 tabs daily for 3 days, 3 tabs daily for 3 days, 2 tabs daily for 3 days, 1 tabs daily for 3 days 6/11/18  Yes James Singh PA-C   SYMBICORT 80-4.5 MCG/ACT AERO INHALE TWO PUFFS BY MOUTH INTO LUNGS TWICE DAILY 4/3/18  Yes ABHIJEET Yanez CNP   ipratropium (ATROVENT) 0.02 % nebulizer solution Take 2.5 mLs by nebulization 4 times daily 8/31/17  Yes Jori Moreira MD   albuterol (PROVENTIL) (2.5 MG/3ML) 0.083% nebulizer solution Take 3 mLs by nebulization every 6 hours as needed for Wheezing 6/1/17  Yes Jori Moreira MD   losartan (COZAAR) 100 MG tablet Take 1 tablet by mouth daily 4/12/17  Yes ABHIJEET Yanez CNP       Allergies:  Patient has no known allergies. Social History:   TOBACCO:   reports that he has been smoking Cigarettes. He has been smoking about 0.10 packs per day. He has never used smokeless tobacco.  ETOH:   reports that he drinks about 12.6 oz of alcohol per week . Family History:   Family History   Problem Relation Age of Onset    Stroke Mother     Emphysema Father     Cancer Father     Diabetes Paternal Grandmother     Stroke Paternal Grandmother        Review of Systems:  Constitutional:negative  for fevers, and negative for chills.   Respiratory: positive for shortness of breath, positive for cough, and positive for wheezing  Cardiovascular: negative for chest pain, negative Results   Component Value Date    GLUCOSE 134 (H) 08/10/2018    BUN 17 08/10/2018    CREATININE 0.60 (L) 08/10/2018     08/10/2018    K 4.8 08/10/2018    CALCIUM 9.5 08/10/2018    CL 93 (L) 08/10/2018    CO2 40 (H) 08/10/2018     Lab Results   Component Value Date    LACTA 1.0 08/09/2018     Results for Dwight Quevedo (MRN [id number]) as of 8/10/2018 08:10   Ref. Range 8/9/2018 21:00   pH, Arterial Latest Ref Range: 7.35 - 7.45  7.296 (L)   pCO2, Arterial Latest Ref Range: 35 - 45 mmHg 77.4 (HH)   pO2, Arterial Latest Ref Range: 80 - 100 mmHg 73.1 (L)   HCO3, Arterial Latest Ref Range: 22 - 26 mmol/L 36.9 (H)   Positive Base Excess, Art Latest Ref Range: 0.0 - 2.0 mmol/L 7.2 (H)   O2 Sat, Arterial Latest Ref Range: 95 - 98 % 92.4 (L)         Narrative   FINAL REPORT       EXAM:  XR RIBS LEFT INCLUDE CHEST (MIN 3 VIEWS)       HISTORY:  left rib pain, SOB        TECHNIQUE:  Multiple views of the left ribs        PRIORS:  Chest x-ray from 09/2000       FINDINGS:     The bones are diffusely demineralized. The ribs are intact without evidence of fracture. The cardiomediastinal silhouette appears normal. The visualized lung hyperaerated consistent with emphysema. The soft tissues are unremarkable        Impression:     No evidence of acute rib fracture       Emphysema            Electronically Signed By: Meagan Ortega   on  08/09/2018  20:36         EKG reviewed: my interpretations is sinus tachycardia with normal axis but non-specific ST changes      Problem List:      Principal Problem:    Acute on chronic respiratory failure (HCC)  Active Problems:    COPD exacerbation (HCC)    COPD, severe (HCC)    Essential hypertension    Dyslipidemia    Severe malnutrition (HCC)    Chronic respiratory failure with hypoxia (HCC)    Tobacco abuse  Resolved Problems:    * No resolved hospital problems. *      Assessment and Plan:      This patient requires inpatient ICU admission because of Acute on chronic respiratory failure due to

## 2018-08-10 NOTE — PROGRESS NOTES
limited or restricted       Goals  Short term goals  Time Frame for Short term goals: 14 visits   Short term goal 1: Patient will demonstrate the ability to ambulate 50ftx1 with LRAD with SBAx1 in order to ease ADLS   Short term goal 2: Patient will demonstrate the ability to complete bed mobility and transfers with SBAx1 in order to ease functional mobility   Short term goal 3: Patient will demonstrate the ability to ascend/descend 5 steps with use of HR and supervision assistance in order to safely get in/out of the home   Short term goal 4: Patient will tolerate 35-45' ther-ex in order to increase endurance and ease ADLs        Therapy Time   Individual Concurrent Group Co-treatment   Time In 0730         Time Out 0753         Minutes 23         Timed Code Treatment Minutes: Artem Merino, PT, DPT

## 2018-08-10 NOTE — ED PROVIDER NOTES
[facility]  eMERGENCY dEPARTMENT eNCOUnter          [facility]       Chief Complaint   Patient presents with    Shortness of Breath     history of COPD, wears oxygen contiuous    Rib Pain     left rib pain, worse today        Nurses Notes reviewed and I agree except as noted in the HPI. HISTORY OF PRESENT ILLNESS    Salud Osborn is a 48 y.o. male who presents To emergency room with complaints of short of breath, as well as some left lateral rib pain and increased cough. Patient states chest wall pain symptoms for the past 2 days, denies any trauma or falls denies fever chills, thinks he has a \"sinus infection or something\". Patient has no history of COPD, does wear home oxygen to his per minute though admits he is been increasing it at home up to 3 L/m with some improvement, patient does occasionally still smoke. Patient does see a pulmonologist Dr. Iris Meek.  Patient rates his chest discomfort 10 out of 10. REVIEW OF SYSTEMS     Review of Systems   All other systems reviewed and are negative. PAST MEDICAL HISTORY    has a past medical history of Arthritis; COPD (chronic obstructive pulmonary disease) ([address] Hypertension; and Scoliosis. SURGICAL HISTORY      has a past surgical history that includes Tonsillectomy; other surgical history (Left, 04/13/2017); and Facial reconstruction surgery (Left, 4/13/2017).     CURRENT MEDICATIONS       Previous Medications    ALBUTEROL (PROVENTIL) (2.5 MG/3ML) 0.083% NEBULIZER SOLUTION    Take 3 mLs by nebulization every 6 hours as needed for Wheezing    INCRUSE ELLIPTA 62.5 MCG/INH AEPB    INHALE ONE PUFF INTO THE LUNGS ONCE DAILY    IPRATROPIUM (ATROVENT) 0.02 % NEBULIZER SOLUTION    Take 2.5 mLs by nebulization 4 times daily    LOSARTAN (COZAAR) 100 MG TABLET    Take 1 tablet by mouth daily    MONTELUKAST (SINGULAIR) 10 MG TABLET    Take 1 tablet by mouth daily    NICOTINE (NICODERM CQ) 21 MG/24HR    Place 1 patch onto the PANEL - Abnormal; Notable for the following:     Glucose 139 (*)     CREATININE 0.55 (*)     Bun/Cre Ratio 31 (*)     Chloride 93 (*)     CO2 39 (*)     Anion Gap 7 (*)     All other components within normal limits   BLOOD GAS, ARTERIAL - Abnormal; Notable for the following:     pH, Arterial 7.296 (*)     pCO2, Arterial 77.4 (*)     pO2, Arterial 73.1 (*)     HCO3, Arterial 36.9 (*)     Positive Base Excess, Art 7.2 (*)     O2 Sat, Arterial 92.4 (*)     All other components within normal limits   TROPONIN   BRAIN NATRIURETIC PEPTIDE   LACTIC ACID, PLASMA       EMERGENCY DEPARTMENT COURSE:   Vitals:    Vitals:    08/09/18 2105 08/09/18 2110 08/09/18 2116 08/09/18 2131   BP:   106/79 (!) 137/110   Pulse:  110 111 124   Resp:  12 16 18   Temp:       TempSrc:       SpO2: 95% 99% 98% 95%   Weight:         Patient history physical exam taken at bedside, discussed patient's symptoms and exam findings as well as plan workup to include EKG chest x-ray blood work, DuoNeb breathing treatment, IV site Medrol. Patient resting in bed semi-Fowlers, placed on O2 at 4 L/m, cardiac monitor. Significant other at bedside. EKG as above, chest x-ray reviewed    Lab workup reviewed    Patient was reevaluated after breathing treatment, although there is no wheezing contused hip or global movement, second breathing treatment ordered as well as arterial blood gas at patient's baseline 2 L/m    Patient reevaluated after second breathing treatment, no stiff skin change. Blood gas evaluated, noting PCO2 77, pH 7.29, PO2 73    Lorazepam 1 mg IV, BiPAP ordered    Case was discussed with Dr. Mary Mcclure, hospitalist, regarding patient's presentation and current workup, accepted for ICU admission    Critical Care Time: 35 minutes, including direct patient interaction, discussion with family, discussion with hospitalist    FINAL IMPRESSION      1. Acute respiratory failure with hypercapnia (HCC)    2. Anxiety    3. Left-sided chest wall pain    4. History of COPD          DISPOSITION/PLAN   Admit ICU    PATIENT REFERRED TO:  No follow-up provider specified. DISCHARGE MEDICATIONS:  New Prescriptions    No medications on file           Summation      Patient Course:  [facility] ICU    ED Medications administered this visit:    Medications   promethazine-codeine (PHENERGAN with CODEINE) 6.25-10 MG/5ML syrup 5 mL (5 mLs Oral Given 8/9/18 1925)   ipratropium-albuterol (DUONEB) nebulizer solution 1 ampule (1 ampule Inhalation Given 8/9/18 1933)   methylPREDNISolone sodium (SOLU-MEDROL) injection 125 mg (125 mg Intravenous Given 8/9/18 1927)   ipratropium-albuterol (DUONEB) nebulizer solution 1 ampule (1 ampule Inhalation Given 8/9/18 2105)   LORazepam (ATIVAN) injection 1 mg (1 mg Intravenous Given 8/9/18 2138)       New Prescriptions from this visit:    New Prescriptions    No medications on file       Follow-up:  No follow-up provider specified. Final Impression:   1. Acute respiratory failure with hypercapnia (HCC)    2. Anxiety    3. Left-sided chest wall pain    4.  History of COPD               (Please note that portions of this note were completed with a voice recognition program.  Efforts were made to edit the dictations but occasionally words are mis-transcribed.)    MD Tyrese Fu MD  08/09/18 1952

## 2018-08-10 NOTE — PROGRESS NOTES
Occupational Therapy  Facility/Department: [facility] AT THE [facility]  Daily Treatment Note  NAME: Tiffani Platt  : 1965  MRN: [id number]    Date of Service: 8/10/2018    Discharge Recommendations:  Continue to assess pending progress       Patient Diagnosis(es): The primary encounter diagnosis was Acute respiratory failure with hypercapnia ([facility] [address] Diagnoses of Anxiety, Left-sided chest wall pain, and History of COPD were also pertinent to this visit. has a past medical history of Arthritis; COPD (chronic obstructive pulmonary disease) ([address] Hypertension; and Scoliosis. has a past surgical history that includes Tonsillectomy; other surgical history (Left, 2017); and Facial reconstruction surgery (Left, 2017). Restrictions  Restrictions/Precautions  Restrictions/Precautions: General Precautions, Fall Risk  Subjective   General  Chart Reviewed: Yes  Patient assessed for rehabilitation services?: Yes  Family / Caregiver Present: Yes  Subjective  Subjective: Patient rates pain 8/10 in L rib cage and neck area. Pain Assessment  Patient Currently in Pain: Yes  Pain Level: 8  Pain Type: Acute pain  Pain Location: Chest;Rib cage; Neck  Pain Orientation: Left  Pain Descriptors: Sayda Cellar; Shooting;Dull (Dull in neck)  Pain Frequency: Continuous  Vital Signs  Patient Currently in Pain: Yes   Orientation     Objective    ADL  Additional Comments: Pt ed on EC/ WS techniques to increase safety for ease of self care tasks. Functional Mobility  Functional Mobility Comments: Pt declined fxl mobility at this time d/t pain. Transfers  Transfer Comments: Pt declined transfers at this time d/t pain. Type of ROM/Therapeutic Exercise  Type of ROM/Therapeutic Exercise: Free weights  Comment: BUE ex to increase strength for ease of self care tasks. Pt declined 2#, completed 1# free weight x 10-15 reps in all planes while seating in bed HOB raised.  Pt limited by pain and weakness AEB frequent RB, grimacing, and tremors of exertion. LUE AROM (degrees)  LUE AROM : WFL  Left Hand AROM (degrees)  Left Hand AROM: WFL  RUE AROM (degrees)  RUE AROM : WFL  Right Hand AROM (degrees)  Right Hand AROM: WFL                 Assessment   Performance deficits / Impairments: Decreased functional mobility ; Decreased ADL status; Decreased endurance;Decreased balance;Decreased strength  Assessment: Patient presents with respiratory failure resulting in increased SOB with ADL, generalized weakness and deconditioning. OT to address and educate on EC/ES and AE/DME. Prognosis: Fair  Decision Making: Medium Complexity  Patient Education: OT services, POC  REQUIRES OT FOLLOW UP: Yes  Activity Tolerance  Activity Tolerance: Patient limited by pain  Safety Devices  Safety Devices in place: Yes  Type of devices: Call light within reach; Left in bed          Plan   Plan  Times per day: Daily  Current Treatment Recommendations: Strengthening, Balance Training, Functional Mobility Training, Endurance Training, Safety Education & Training, Self-Care / ADL, Patient/Caregiver Education & Training, Equipment Evaluation, Education, & procurement  G-Code  OT G-codes  Functional Limitation: Self care  Self Care Current Status (): At least 1 percent but less than 20 percent impaired, limited or restricted  Self Care Goal Status (): 0 percent impaired, limited or restricted  OutComes Score                                           AM-PAC Score             Goals  Short term goals  Time Frame for Short term goals: 3-5 days  Short term goal 1: Patient to state 5 EC/WS techniques to implement throughout I/ADL tasks. Short term goal 2: Patient to complete ADL routine while implementing EC/WS techniques and/or AE/DME to improve ADL independence and tolerance. Short term goal 3: Patient to engage in 15 min of ther ex/ther act with rest breaks PRN to improve UE/UB strength and activity tolerance.        Therapy Time   Individual

## 2018-08-10 NOTE — PLAN OF CARE
Problem: Pain:  Goal: Control of acute pain  Control of acute pain   Outcome: Ongoing  Complains of left rib pain with coughing. Uses folded bath blanket to splint ribs when coughing. Goal: Control of chronic pain  Control of chronic pain   Outcome: Ongoing      Problem: Falls - Risk of:  Goal: Absence of physical injury  Absence of physical injury   Outcome: Ongoing  Falling star program in place. Fall risk calculated at 39. Patient alert and oriented to own ability and uses call light appropriately. No Falls as of present. Problem: Risk for Impaired Skin Integrity  Goal: Tissue integrity - skin and mucous membranes  Structural intactness and normal physiological function of skin and  mucous membranes. Outcome: Ongoing  Beltran score calculated at 19. Patient able to reposition self. Taking nutritional supplements TID with meals. Problem: Activity Intolerance:  Goal: Ability to tolerate increased activity will improve  Ability to tolerate increased activity will improve   Outcome: Ongoing  PT/OT for maximum mobility. Patient transferring to chair with one assist.  Dyspnea with transfer. Problem: Airway Clearance - Ineffective:  Goal: Ability to maintain a clear airway will improve  Ability to maintain a clear airway will improve   Able to maintain clear airway. Spontaneous cough. States small amount of clear white sputum occasionally. Problem: Breathing Pattern - Ineffective:  Goal: Ability to achieve and maintain a regular respiratory rate will improve  Ability to achieve and maintain a regular respiratory rate will improve   Outcome: Ongoing  Respirations shallow and labored with any exertion. Activity as tolerated with rest periods as needed. Problem: Gas Exchange - Impaired:  Goal: Levels of oxygenation will improve  Levels of oxygenation will improve   Outcome: Ongoing  SAO2 within normal limits with oxygen at 2 liters nasal cannula. Head of bed self controlled for comfort.   Aerosol

## 2018-08-10 NOTE — PROGRESS NOTES
Occupational Therapy   Occupational Therapy Initial Assessment  Date: 8/10/2018   Patient Name: Magnus Lobato  MRN: [id number]     : 1965    Date of Service: 8/10/2018    Discharge Recommendations:  Continue to assess pending progress         Patient Diagnosis(es): The primary encounter diagnosis was Acute respiratory failure with hypercapnia ([facility] [address] Diagnoses of Anxiety, Left-sided chest wall pain, and History of COPD were also pertinent to this visit. has a past medical history of Arthritis; COPD (chronic obstructive pulmonary disease) ([facility] [address] Hypertension; and Scoliosis. has a past surgical history that includes Tonsillectomy; other surgical history (Left, 2017); and Facial reconstruction surgery (Left, 2017). Restrictions  Restrictions/Precautions  Restrictions/Precautions: General Precautions, Fall Risk    Subjective   General  Chart Reviewed: Yes  Patient assessed for rehabilitation services?: Yes  Subjective  Subjective: Patient rates pain 10/10 in L rib cage.   Pain Assessment  Patient Currently in Pain: Yes  Pain Assessment: 0-10  Pain Level: 10 (when I cough)  Pain Type: Acute pain  Pain Location: Chest, Rib cage  Pain Orientation: Left  Pain Descriptors: Sharp  Pain Frequency: Intermittent  Clinical Progression: Not changed  Patient's Stated Pain Goal: No pain  Pain Intervention(s): Repositioned  Response to Pain Intervention: Asleep with RR greater than 10  Oxygen Therapy  SpO2: 96 %  Social/Functional History  Social/Functional History  Lives With: Significant other  Type of Home: Apartment  Home Access: Stairs to enter with rails  Entrance Stairs - Number of Steps: 14 steps   Bathroom Shower/Tub: Tub/Shower unit  Bathroom Equipment: Shower chair  Receives Help From: Friend(s)  ADL Assistance: Independent  Homemaking Assistance: Needs assistance  Ambulation Assistance: Independent  Transfer Assistance: Independent  Additional Comments: patient stated he has 14 steps to get Endurance Training, Safety Education & Training, Self-Care / ADL, Patient/Caregiver Education & Training, Equipment Evaluation, Education, & procurement    G-Code  OT G-codes  Functional Limitation: Self care  Self Care Current Status (): At least 1 percent but less than 20 percent impaired, limited or restricted  Self Care Goal Status (): 0 percent impaired, limited or restricted  OutComes Score                                           AM-PAC Score             Goals  Short term goals  Time Frame for Short term goals: 3-5 days  Short term goal 1: Patient to state 5 EC/WS techniques to implement throughout I/ADL tasks. Short term goal 2: Patient to complete ADL routine while implementing EC/WS techniques and/or AE/DME to improve ADL independence and tolerance. Short term goal 3: Patient to engage in 15 min of ther ex/ther act with rest breaks PRN to improve UE/UB strength and activity tolerance.        Therapy Time   Individual Concurrent Group Co-treatment   Time In 0845         Time Out 0900         Minutes 24 [facility], OTR/L

## 2018-08-10 NOTE — PROGRESS NOTES
Physical Therapy  Facility/Department: [facility] AT THE [facility]  Daily Treatment Note  NAME: Shyla Cotter  : 1965  MRN: [id number]    Date of Service: 8/10/2018    Discharge Recommendations:  Continue to assess pending progress        Patient Diagnosis(es): The primary encounter diagnosis was Acute respiratory failure with hypercapnia ([address] Diagnoses of Anxiety, Left-sided chest wall pain, and History of COPD were also pertinent to this visit. has a past medical history of Arthritis; COPD (chronic obstructive pulmonary disease) ([address] Hypertension; and Scoliosis. has a past surgical history that includes Tonsillectomy; other surgical history (Left, 2017); and Facial reconstruction surgery (Left, 2017). Restrictions  Restrictions/Precautions  Restrictions/Precautions: General Precautions, Fall Risk  Subjective   General  Chart Reviewed: Yes  Subjective  Subjective: Pt reported 6/10 L rib pain at rest and 10/10 when he is coughing. General Comment  Comments: Nursing (CR[facility] [place]) aware of pain level. Orientation  Orientation  Overall Orientation Status: Within Normal Limits  Objective   Bed mobility  Supine to Sit: Contact guard assistance  Sit to Supine: Contact guard assistance  Scooting: Contact guard assistance  Comment: Cues for safety. Transfers  Sit to Stand: Unable to assess  Stand to sit: Unable to assess  Comment: Pt refused d/t pain/HERRERA. Ambulation  Ambulation?: No (Pt refused d/t pain/HERRERA. )     Balance  Posture: Fair  Sitting - Static: Good  Sitting - Dynamic: Good  Exercises  Straight Leg Raise: 15x  Quad Sets: 15x  Heelslides: 15x  Gluteal Sets: 15x  Hip Flexion: 15x  Hip Abduction: 15x  Knee Long Arc Quad: 15x  Ankle Pumps: 15x  Comments: Above exercises completed in seated/supine. Pt needed rest breaks after every exercise d/t HERRERA/pain. SPO2 93% or higher throughout treatment.        Assessment      Patient Education: Educated pt on safety with bed mobility with pt demonstrating

## 2018-08-11 LAB
ABSOLUTE EOS #: <0.03 K/UL (ref 0–0.44)
ABSOLUTE IMMATURE GRANULOCYTE: 0.03 K/UL (ref 0–0.3)
ABSOLUTE LYMPH #: 0.73 K/UL (ref 1.1–3.7)
ABSOLUTE MONO #: 0.52 K/UL (ref 0.1–1.2)
ANION GAP SERPL CALCULATED.3IONS-SCNC: 6 MMOL/L (ref 9–17)
BASOPHILS # BLD: 0 % (ref 0–2)
BASOPHILS ABSOLUTE: <0.03 K/UL (ref 0–0.2)
BUN BLDV-MCNC: 22 MG/DL (ref 6–20)
BUN/CREAT BLD: 40 (ref 9–20)
CALCIUM SERPL-MCNC: 8.9 MG/DL (ref 8.6–10.4)
CHLORIDE BLD-SCNC: 99 MMOL/L (ref 98–107)
CO2: 34 MMOL/L (ref 20–31)
CREAT SERPL-MCNC: 0.55 MG/DL (ref 0.7–1.2)
DIFFERENTIAL TYPE: ABNORMAL
EOSINOPHILS RELATIVE PERCENT: 0 % (ref 1–4)
GFR AFRICAN AMERICAN: >60 ML/MIN
GFR NON-AFRICAN AMERICAN: >60 ML/MIN
GFR SERPL CREATININE-BSD FRML MDRD: ABNORMAL ML/MIN/{1.73_M2}
GFR SERPL CREATININE-BSD FRML MDRD: ABNORMAL ML/MIN/{1.73_M2}
GLUCOSE BLD-MCNC: 105 MG/DL (ref 70–99)
HCT VFR BLD CALC: 33.3 % (ref 40.7–50.3)
HEMOGLOBIN: 11.4 G/DL (ref 13–17)
IMMATURE GRANULOCYTES: 0 %
LYMPHOCYTES # BLD: 7 % (ref 24–43)
MCH RBC QN AUTO: 33.4 PG (ref 25.2–33.5)
MCHC RBC AUTO-ENTMCNC: 34.2 G/DL (ref 28.4–34.8)
MCV RBC AUTO: 97.7 FL (ref 82.6–102.9)
MONOCYTES # BLD: 5 % (ref 3–12)
NRBC AUTOMATED: 0 PER 100 WBC
PDW BLD-RTO: 12.3 % (ref 11.8–14.4)
PLATELET # BLD: 144 K/UL (ref 138–453)
PLATELET ESTIMATE: ABNORMAL
PMV BLD AUTO: 9.6 FL (ref 8.1–13.5)
POTASSIUM SERPL-SCNC: 4 MMOL/L (ref 3.7–5.3)
RBC # BLD: 3.41 M/UL (ref 4.21–5.77)
RBC # BLD: ABNORMAL 10*6/UL
SEG NEUTROPHILS: 88 % (ref 36–65)
SEGMENTED NEUTROPHILS ABSOLUTE COUNT: 9.65 K/UL (ref 1.5–8.1)
SODIUM BLD-SCNC: 139 MMOL/L (ref 135–144)
TROPONIN INTERP: NORMAL
TROPONIN T: <0.03 NG/ML
WBC # BLD: 10.9 K/UL (ref 3.5–11.3)
WBC # BLD: ABNORMAL 10*3/UL

## 2018-08-11 PROCEDURE — 94660 CPAP INITIATION&MGMT: CPT

## 2018-08-11 PROCEDURE — 2500000003 HC RX 250 WO HCPCS: Performed by: FAMILY MEDICINE

## 2018-08-11 PROCEDURE — 2000000000 HC ICU R&B

## 2018-08-11 PROCEDURE — 6370000000 HC RX 637 (ALT 250 FOR IP): Performed by: FAMILY MEDICINE

## 2018-08-11 PROCEDURE — 6370000000 HC RX 637 (ALT 250 FOR IP): Performed by: INTERNAL MEDICINE

## 2018-08-11 PROCEDURE — 94664 DEMO&/EVAL PT USE INHALER: CPT

## 2018-08-11 PROCEDURE — 6360000002 HC RX W HCPCS: Performed by: INTERNAL MEDICINE

## 2018-08-11 PROCEDURE — 94640 AIRWAY INHALATION TREATMENT: CPT

## 2018-08-11 PROCEDURE — 97110 THERAPEUTIC EXERCISES: CPT

## 2018-08-11 PROCEDURE — 6360000002 HC RX W HCPCS: Performed by: PHYSICIAN ASSISTANT

## 2018-08-11 PROCEDURE — 2580000003 HC RX 258: Performed by: FAMILY MEDICINE

## 2018-08-11 PROCEDURE — 2580000003 HC RX 258: Performed by: INTERNAL MEDICINE

## 2018-08-11 PROCEDURE — 93005 ELECTROCARDIOGRAM TRACING: CPT

## 2018-08-11 PROCEDURE — 97116 GAIT TRAINING THERAPY: CPT

## 2018-08-11 PROCEDURE — 94760 N-INVAS EAR/PLS OXIMETRY 1: CPT

## 2018-08-11 PROCEDURE — 85025 COMPLETE CBC W/AUTO DIFF WBC: CPT

## 2018-08-11 PROCEDURE — 6360000002 HC RX W HCPCS: Performed by: FAMILY MEDICINE

## 2018-08-11 PROCEDURE — 80048 BASIC METABOLIC PNL TOTAL CA: CPT

## 2018-08-11 PROCEDURE — 36415 COLL VENOUS BLD VENIPUNCTURE: CPT

## 2018-08-11 PROCEDURE — 84484 ASSAY OF TROPONIN QUANT: CPT

## 2018-08-11 RX ORDER — 0.9 % SODIUM CHLORIDE 0.9 %
500 INTRAVENOUS SOLUTION INTRAVENOUS ONCE
Status: COMPLETED | OUTPATIENT
Start: 2018-08-11 | End: 2018-08-11

## 2018-08-11 RX ORDER — DIGOXIN 0.25 MG/ML
250 INJECTION INTRAMUSCULAR; INTRAVENOUS EVERY 8 HOURS
Status: COMPLETED | OUTPATIENT
Start: 2018-08-11 | End: 2018-08-12

## 2018-08-11 RX ORDER — METOPROLOL TARTRATE 5 MG/5ML
5 INJECTION INTRAVENOUS ONCE
Status: COMPLETED | OUTPATIENT
Start: 2018-08-11 | End: 2018-08-11

## 2018-08-11 RX ADMIN — ENOXAPARIN SODIUM 40 MG: 40 INJECTION, SOLUTION INTRAVENOUS; SUBCUTANEOUS at 07:46

## 2018-08-11 RX ADMIN — METOPROLOL TARTRATE 5 MG: 5 INJECTION INTRAVENOUS at 18:36

## 2018-08-11 RX ADMIN — Medication 5 ML: at 21:48

## 2018-08-11 RX ADMIN — IPRATROPIUM BROMIDE AND ALBUTEROL SULFATE 1 AMPULE: .5; 3 SOLUTION RESPIRATORY (INHALATION) at 10:07

## 2018-08-11 RX ADMIN — DOXYCYCLINE HYCLATE 100 MG: 100 CAPSULE, GELATIN COATED ORAL at 22:48

## 2018-08-11 RX ADMIN — SODIUM CHLORIDE: 9 INJECTION, SOLUTION INTRAVENOUS at 14:07

## 2018-08-11 RX ADMIN — KETOROLAC TROMETHAMINE 15 MG: 15 INJECTION, SOLUTION INTRAMUSCULAR; INTRAVENOUS at 09:33

## 2018-08-11 RX ADMIN — IPRATROPIUM BROMIDE AND ALBUTEROL SULFATE 1 AMPULE: .5; 3 SOLUTION RESPIRATORY (INHALATION) at 14:54

## 2018-08-11 RX ADMIN — METHYLPREDNISOLONE SODIUM SUCCINATE 60 MG: 125 INJECTION, POWDER, FOR SOLUTION INTRAMUSCULAR; INTRAVENOUS at 06:54

## 2018-08-11 RX ADMIN — FAMOTIDINE 20 MG: 20 TABLET ORAL at 20:42

## 2018-08-11 RX ADMIN — METHYLPREDNISOLONE SODIUM SUCCINATE 60 MG: 125 INJECTION, POWDER, FOR SOLUTION INTRAMUSCULAR; INTRAVENOUS at 18:36

## 2018-08-11 RX ADMIN — IPRATROPIUM BROMIDE AND ALBUTEROL SULFATE 1 AMPULE: .5; 3 SOLUTION RESPIRATORY (INHALATION) at 21:15

## 2018-08-11 RX ADMIN — KETOROLAC TROMETHAMINE 15 MG: 15 INJECTION, SOLUTION INTRAMUSCULAR; INTRAVENOUS at 16:26

## 2018-08-11 RX ADMIN — MOMETASONE FUROATE AND FORMOTEROL FUMARATE DIHYDRATE 2 PUFF: 200; 5 AEROSOL RESPIRATORY (INHALATION) at 10:07

## 2018-08-11 RX ADMIN — LOSARTAN POTASSIUM 100 MG: 50 TABLET ORAL at 07:46

## 2018-08-11 RX ADMIN — KETOROLAC TROMETHAMINE 15 MG: 15 INJECTION, SOLUTION INTRAMUSCULAR; INTRAVENOUS at 02:59

## 2018-08-11 RX ADMIN — Medication 5 ML: at 16:30

## 2018-08-11 RX ADMIN — KETOROLAC TROMETHAMINE 15 MG: 15 INJECTION, SOLUTION INTRAMUSCULAR; INTRAVENOUS at 21:48

## 2018-08-11 RX ADMIN — METOPROLOL TARTRATE 5 MG: 5 INJECTION INTRAVENOUS at 17:03

## 2018-08-11 RX ADMIN — DOXYCYCLINE HYCLATE 100 MG: 100 CAPSULE, GELATIN COATED ORAL at 11:18

## 2018-08-11 RX ADMIN — DIGOXIN 250 MCG: 250 INJECTION, SOLUTION INTRAMUSCULAR; INTRAVENOUS; PARENTERAL at 17:34

## 2018-08-11 RX ADMIN — MONTELUKAST SODIUM 10 MG: 10 TABLET, FILM COATED ORAL at 07:46

## 2018-08-11 RX ADMIN — FAMOTIDINE 20 MG: 20 TABLET ORAL at 07:46

## 2018-08-11 RX ADMIN — MOMETASONE FUROATE AND FORMOTEROL FUMARATE DIHYDRATE 2 PUFF: 200; 5 AEROSOL RESPIRATORY (INHALATION) at 21:15

## 2018-08-11 RX ADMIN — IPRATROPIUM BROMIDE AND ALBUTEROL SULFATE 1 AMPULE: .5; 3 SOLUTION RESPIRATORY (INHALATION) at 05:14

## 2018-08-11 RX ADMIN — SODIUM CHLORIDE 500 ML: 9 INJECTION, SOLUTION INTRAVENOUS at 17:24

## 2018-08-11 ASSESSMENT — PAIN DESCRIPTION - ORIENTATION
ORIENTATION: LEFT

## 2018-08-11 ASSESSMENT — PAIN DESCRIPTION - FREQUENCY
FREQUENCY: INTERMITTENT
FREQUENCY: OTHER (COMMENT)
FREQUENCY: INTERMITTENT

## 2018-08-11 ASSESSMENT — PAIN SCALES - GENERAL
PAINLEVEL_OUTOF10: 5
PAINLEVEL_OUTOF10: 6
PAINLEVEL_OUTOF10: 3
PAINLEVEL_OUTOF10: 7
PAINLEVEL_OUTOF10: 5
PAINLEVEL_OUTOF10: 0
PAINLEVEL_OUTOF10: 3
PAINLEVEL_OUTOF10: 5
PAINLEVEL_OUTOF10: 5

## 2018-08-11 ASSESSMENT — PAIN DESCRIPTION - DESCRIPTORS
DESCRIPTORS: CONSTANT
DESCRIPTORS: SHARP
DESCRIPTORS: CONSTANT;SHARP
DESCRIPTORS: SHARP
DESCRIPTORS: SHOOTING;SHARP
DESCRIPTORS: SHARP;SHOOTING

## 2018-08-11 ASSESSMENT — PAIN DESCRIPTION - PAIN TYPE
TYPE: ACUTE PAIN

## 2018-08-11 ASSESSMENT — PAIN DESCRIPTION - LOCATION
LOCATION: RIB CAGE
LOCATION: RIB CAGE
LOCATION: CHEST
LOCATION: RIB CAGE

## 2018-08-11 ASSESSMENT — PAIN DESCRIPTION - ONSET
ONSET: OTHER (COMMENT)

## 2018-08-11 NOTE — PROGRESS NOTES
Occupational Therapy  Facility/Department: [facility] AT THE [facility]  Daily Treatment Note  NAME: Westley Smith  : 1965  MRN: [id number]    Date of Service: 2018    Discharge Recommendations:  Continue to assess pending progress       Patient Diagnosis(es): The primary encounter diagnosis was Acute respiratory failure with hypercapnia ([address] Diagnoses of Anxiety, Left-sided chest wall pain, and History of COPD were also pertinent to this visit. has a past medical history of Arthritis; COPD (chronic obstructive pulmonary disease) ([address] Hypertension; and Scoliosis. has a past surgical history that includes Tonsillectomy; other surgical history (Left, 2017); and Facial reconstruction surgery (Left, 2017). Restrictions  Restrictions/Precautions  Restrictions/Precautions: General Precautions, Fall Risk  Subjective   General  Chart Reviewed: Yes  Patient assessed for rehabilitation services?: Yes  Response to previous treatment: Patient with no complaints from previous session  Family / Caregiver Present: No  Subjective  Subjective: Pt was sitting up in bed upon arrival. Pt agreed to participate in OT session. Pt reported 5/10 pain in ribs on L side stating \"it's probably from coughing\". Orientation     Objective                                                                Type of ROM/Therapeutic Exercise  Type of ROM/Therapeutic Exercise: AROM; Free weights  Comment: Pt tolerated AROM/Strengthening with 1# dumbbell x 5 planes x 15 reps with RUE and 2 planes x 15 reps x with LUE d/t pain and irritation at IV site. Pt required mod RBs as needed secondary to fatigue and min SOB. Pt utilized pursed lip breathing through out tasks. O2 94-96% on 2L of continuous O2. Assessment      Activity Tolerance  Activity Tolerance: Patient limited by fatigue  Safety Devices  Safety Devices in place: Yes  Type of devices: Call light within reach; Left in bed  Restraints  Initially in place:  Yes

## 2018-08-11 NOTE — PROGRESS NOTES
Physical Therapy  Facility/Department: [facility] AT THE [facility]  Daily Treatment Note  NAME: Salud Osborn  : 1965  MRN: [id number]    Date of Service: 2018    Discharge Recommendations:  Continue to assess pending progress        Patient Diagnosis(es): The primary encounter diagnosis was Acute respiratory failure with hypercapnia ([address] Diagnoses of Anxiety, Left-sided chest wall pain, and History of COPD were also pertinent to this visit. has a past medical history of Arthritis; COPD (chronic obstructive pulmonary disease) ([address] Hypertension; and Scoliosis. has a past surgical history that includes Tonsillectomy; other surgical history (Left, 2017); and Facial reconstruction surgery (Left, 2017). Restrictions  Restrictions/Precautions  Restrictions/Precautions: General Precautions, Fall Risk  Subjective   General  Chart Reviewed: Yes  Subjective  Subjective: Pt reported 5-6/10 L rib/chest pain. General Comment  Comments: Nursing Masha Jones RN) aware of pain level. Orientation  Orientation  Overall Orientation Status: Within Normal Limits  Objective   Bed mobility  Supine to Sit: Contact guard assistance  Sit to Supine: Contact guard assistance  Scooting: Contact guard assistance  Comment: Verbal cues for technique. Transfers  Sit to Stand: Contact guard assistance  Stand to sit: Contact guard assistance  Comment: Minimal cues for safety. Ambulation  Ambulation?: Yes  Ambulation 1  Surface: level tile  Device: No Device  Assistance: Contact guard assistance  Distance: 20 feet x 1, 10 feet x 2  Comments: Pt needed frequent rest breaks d/t HERREAR. HR increased to 170s to 180s after amb with Ramesh Colin RN aware. SPO2 92% or higher.    Stairs/Curb  Stairs?: No     Balance  Posture: Fair  Sitting - Static: Good  Sitting - Dynamic: Good  Standing - Static: Fair;+  Standing - Dynamic: Fair;+  Exercises  Hip Flexion: 10x2  Hip Abduction: 10x2  Knee Long Arc Quad: 10x2  Ankle Pumps: 10x2  Comments: Above

## 2018-08-11 NOTE — PLAN OF CARE
Problem: Pain:  Goal: Pain level will decrease  Pain level will decrease   Outcome: Ongoing  Patient states reduced pain with Toradol given as needed. Reports less cough, which exacerbates left rib pain, Phenergan with Codeine. Continuing to assess for pain with intervention as needed. Problem: Falls - Risk of:  Goal: Will remain free from falls  Will remain free from falls   Outcome: Ongoing  Patient is alert and oriented and has demonstrated the use of using the call light for assistance before getting up. Education has been provided to defer the use of the bed alarm and/or restraint free alarm as the patient has shown competency in calling for assistance and verbalizing the risk. Goal: Absence of physical injury  Absence of physical injury   Outcome: Ongoing  Vann fall score calculated on admission and daily at midnight and shows pt at 45 risk for fall. Bed in lowest position at all times with wheels locked. Bed alarm active. Falling star posted on door frame and yellow sticker visible on patient bracelet. Call light and bedside table with in reach. ID information verified as correct and visible. Will continue to monitor and intervene as necessary. Gustavo Serrano RN      Problem: Risk for Impaired Skin Integrity  Goal: Tissue integrity - skin and mucous membranes  Structural intactness and normal physiological function of skin and  mucous membranes. Outcome: Ongoing  Beltran score calculated at 19. Patient able to reposition self. No skin issues noted. Problem: Activity Intolerance:  Goal: Ability to tolerate increased activity will improve  Ability to tolerate increased activity will improve   Outcome: Ongoing  Patient reports slightly better tolerance to activity. Continues to be short of breath with minimal activity. Rest periods provided with activity. Will increase activity as tolerated. PT/OT for assistance with increased tolerance to activity.     Problem: Airway Clearance - Ineffective:  Goal: Ability to maintain a clear airway will improve  Ability to maintain a clear airway will improve   Outcome: Ongoing  Patient able to cough spontaneously. Patient states \"unable to get secretions out. \"  Breathing techniques and positioning for postural drainage reviewed with patient. Problem: Breathing Pattern - Ineffective:  Goal: Ability to achieve and maintain a regular respiratory rate will improve  Ability to achieve and maintain a regular respiratory rate will improve   Outcome: Ongoing  Respirations shallow. Dyspnea with minimal exertion. Breathing techniques reviewed with patient . Problem: Gas Exchange - Impaired:  Goal: Levels of oxygenation will improve  Levels of oxygenation will improve   Outcome: Ongoing  Patient using bi pap during night with FIO2 at 30%. Oxygen at 2 liters nasal cannula during day. SAO2 mid to high 90's. Head of bed self controlled for patient comfort. Patient generally in high fowlers position. Nebulizers per respiratory therapy as ordered.

## 2018-08-11 NOTE — PROGRESS NOTES
Metoprolol 5 mg IV given after phone notification of Dr Anna Rivera. Monitor shows atrial fibrillation with heart rate to 130's- 140's.

## 2018-08-11 NOTE — PROGRESS NOTES
condition deteriorates. HHN AEROSOL THERAPY with  [physician-ordered bronchodilator(s)]  QID and Albuterol PRN q4 hrs. Breath-Actuated Neb if BBS Acuity = 4, and pt. can use MP. Notify physician if condition deteriorates. MDI THERAPY with  2 actuations of [physician-ordered bronchodilator(s)] via spacer TID Albuterol and PRNq4 hrs. If unable to utilize MDI: HHN [physician-ordered bronchodilator(s)] TID and Albuterol PRN q4 hrs. Notify physician if condition deteriorates. MDI THERAPY with  [physician-ordered bronchodilator(s)] via spacer TID PRN. If unable to utilize MDI: HHN [physician-ordered bronchodilator(s)] TID PRN. Notify physician if condition deteriorates. If Acuity Level is 2, 3, or 4 in any of the following:    [] COUGH     [] SURGICAL HISTORY (SURG HX)  [] CHEST XRAY (CXR)    Goal: Improvement in sputum mobilization in patients with ineffective airway clearance. Reverse atelectasis. [] Bronchopulmonary Hygiene Protocol    Total Acuity:   16-32  []  Secondary Assessment in 24 hrs Total Acuity:  9-15  []  Secondary Assessment in 24 hrs Total Acuity:  4-8  []  Secondary Assessment in 48 hrs Total Acuity:  0-3  []  Secondary Assessment in 72 hrs   METANEB QID with [physician-ordered bronchodilator(s)] if CXR Acuity = 4; otherwise:  PD&P, PEP, or Vest QID & PRN  NT Sxn PRN for ineffective cough  METANEB QID with [physician-ordered bronchodilator(s)] if CXR Acuity = 4; otherwise:  PD&P, PEP, or Vest TID & PRN  NT Sxn PRN for ineffective cough  Instruct patient to self-perform IS q1hr WA   Directed Cough self-performed q1hr WA     If Acuity Level is 2 or above in the following:    [] PULMONARY HISTORY (PULM HX)    Goal: Assist patient in quitting smoking to slow or stop the progression of lung disease.     [] Smoking Cessation Protocol    SMOKING CESSATION EDUCATION provided according to policy [id number]: (delgado with an X)  ____Yes    ____ No     ____ NA    Smoking Cessation Booklet

## 2018-08-11 NOTE — PROGRESS NOTES
Patient ambulated to bathroom with physical therapy. While sitting in chair at sink, monitor noted to show atrial fibrillation with rate 170's to 180's. Returned patient to bed. Complains of lightheadedness, dyspnea and \"pounding heart\". Returned to bed. Denies chest pain. Complains of left lateral rib pain with cough, unchanged from previous rib pain.

## 2018-08-12 PROBLEM — I48.0 PAROXYSMAL ATRIAL FIBRILLATION (HCC): Status: ACTIVE | Noted: 2018-08-11

## 2018-08-12 LAB
ABSOLUTE EOS #: <0.03 K/UL (ref 0–0.44)
ABSOLUTE IMMATURE GRANULOCYTE: 0.07 K/UL (ref 0–0.3)
ABSOLUTE LYMPH #: 0.85 K/UL (ref 1.1–3.7)
ABSOLUTE MONO #: 0.49 K/UL (ref 0.1–1.2)
ANION GAP SERPL CALCULATED.3IONS-SCNC: 6 MMOL/L (ref 9–17)
BASOPHILS # BLD: 0 % (ref 0–2)
BASOPHILS ABSOLUTE: <0.03 K/UL (ref 0–0.2)
BUN BLDV-MCNC: 22 MG/DL (ref 6–20)
BUN/CREAT BLD: 43 (ref 9–20)
CALCIUM SERPL-MCNC: 8.5 MG/DL (ref 8.6–10.4)
CHLORIDE BLD-SCNC: 106 MMOL/L (ref 98–107)
CO2: 33 MMOL/L (ref 20–31)
CREAT SERPL-MCNC: 0.51 MG/DL (ref 0.7–1.2)
DIFFERENTIAL TYPE: ABNORMAL
EOSINOPHILS RELATIVE PERCENT: 0 % (ref 1–4)
GFR AFRICAN AMERICAN: >60 ML/MIN
GFR NON-AFRICAN AMERICAN: >60 ML/MIN
GFR SERPL CREATININE-BSD FRML MDRD: ABNORMAL ML/MIN/{1.73_M2}
GFR SERPL CREATININE-BSD FRML MDRD: ABNORMAL ML/MIN/{1.73_M2}
GLUCOSE BLD-MCNC: 96 MG/DL (ref 70–99)
HCT VFR BLD CALC: 35 % (ref 40.7–50.3)
HEMOGLOBIN: 11.4 G/DL (ref 13–17)
IMMATURE GRANULOCYTES: 1 %
LYMPHOCYTES # BLD: 9 % (ref 24–43)
MCH RBC QN AUTO: 33.1 PG (ref 25.2–33.5)
MCHC RBC AUTO-ENTMCNC: 32.6 G/DL (ref 28.4–34.8)
MCV RBC AUTO: 101.7 FL (ref 82.6–102.9)
MONOCYTES # BLD: 5 % (ref 3–12)
NRBC AUTOMATED: 0 PER 100 WBC
PDW BLD-RTO: 13 % (ref 11.8–14.4)
PLATELET # BLD: 170 K/UL (ref 138–453)
PLATELET ESTIMATE: ABNORMAL
PMV BLD AUTO: 10 FL (ref 8.1–13.5)
POTASSIUM SERPL-SCNC: 4.1 MMOL/L (ref 3.7–5.3)
RBC # BLD: 3.44 M/UL (ref 4.21–5.77)
RBC # BLD: ABNORMAL 10*6/UL
SEG NEUTROPHILS: 86 % (ref 36–65)
SEGMENTED NEUTROPHILS ABSOLUTE COUNT: 8.56 K/UL (ref 1.5–8.1)
SODIUM BLD-SCNC: 145 MMOL/L (ref 135–144)
WBC # BLD: 10 K/UL (ref 3.5–11.3)
WBC # BLD: ABNORMAL 10*3/UL

## 2018-08-12 PROCEDURE — 6370000000 HC RX 637 (ALT 250 FOR IP): Performed by: INTERNAL MEDICINE

## 2018-08-12 PROCEDURE — 36415 COLL VENOUS BLD VENIPUNCTURE: CPT

## 2018-08-12 PROCEDURE — 2580000003 HC RX 258: Performed by: INTERNAL MEDICINE

## 2018-08-12 PROCEDURE — 97110 THERAPEUTIC EXERCISES: CPT

## 2018-08-12 PROCEDURE — 2000000000 HC ICU R&B

## 2018-08-12 PROCEDURE — 94640 AIRWAY INHALATION TREATMENT: CPT

## 2018-08-12 PROCEDURE — 94660 CPAP INITIATION&MGMT: CPT

## 2018-08-12 PROCEDURE — 93005 ELECTROCARDIOGRAM TRACING: CPT

## 2018-08-12 PROCEDURE — 6360000002 HC RX W HCPCS: Performed by: PHYSICIAN ASSISTANT

## 2018-08-12 PROCEDURE — 85025 COMPLETE CBC W/AUTO DIFF WBC: CPT

## 2018-08-12 PROCEDURE — 2580000003 HC RX 258: Performed by: FAMILY MEDICINE

## 2018-08-12 PROCEDURE — 6370000000 HC RX 637 (ALT 250 FOR IP): Performed by: FAMILY MEDICINE

## 2018-08-12 PROCEDURE — 97116 GAIT TRAINING THERAPY: CPT

## 2018-08-12 PROCEDURE — 94664 DEMO&/EVAL PT USE INHALER: CPT

## 2018-08-12 PROCEDURE — 6360000002 HC RX W HCPCS: Performed by: FAMILY MEDICINE

## 2018-08-12 PROCEDURE — 97530 THERAPEUTIC ACTIVITIES: CPT

## 2018-08-12 PROCEDURE — 6360000002 HC RX W HCPCS: Performed by: INTERNAL MEDICINE

## 2018-08-12 PROCEDURE — 80048 BASIC METABOLIC PNL TOTAL CA: CPT

## 2018-08-12 RX ORDER — DIGOXIN 125 MCG
125 TABLET ORAL DAILY
Status: DISCONTINUED | OUTPATIENT
Start: 2018-08-12 | End: 2018-08-13

## 2018-08-12 RX ORDER — METHYLPREDNISOLONE SODIUM SUCCINATE 125 MG/2ML
80 INJECTION, POWDER, LYOPHILIZED, FOR SOLUTION INTRAMUSCULAR; INTRAVENOUS EVERY 12 HOURS
Status: DISCONTINUED | OUTPATIENT
Start: 2018-08-12 | End: 2018-08-13

## 2018-08-12 RX ADMIN — APIXABAN 5 MG: 5 TABLET, FILM COATED ORAL at 09:55

## 2018-08-12 RX ADMIN — METHYLPREDNISOLONE SODIUM SUCCINATE 80 MG: 125 INJECTION, POWDER, FOR SOLUTION INTRAMUSCULAR; INTRAVENOUS at 19:23

## 2018-08-12 RX ADMIN — IPRATROPIUM BROMIDE AND ALBUTEROL SULFATE 1 AMPULE: .5; 3 SOLUTION RESPIRATORY (INHALATION) at 10:02

## 2018-08-12 RX ADMIN — DIGOXIN 250 MCG: 250 INJECTION, SOLUTION INTRAMUSCULAR; INTRAVENOUS; PARENTERAL at 09:16

## 2018-08-12 RX ADMIN — LOSARTAN POTASSIUM 100 MG: 50 TABLET ORAL at 08:10

## 2018-08-12 RX ADMIN — DILTIAZEM HYDROCHLORIDE 30 MG: 30 TABLET, FILM COATED ORAL at 21:48

## 2018-08-12 RX ADMIN — APIXABAN 5 MG: 5 TABLET, FILM COATED ORAL at 20:53

## 2018-08-12 RX ADMIN — DIGOXIN 250 MCG: 250 INJECTION, SOLUTION INTRAMUSCULAR; INTRAVENOUS; PARENTERAL at 01:30

## 2018-08-12 RX ADMIN — MOMETASONE FUROATE AND FORMOTEROL FUMARATE DIHYDRATE 2 PUFF: 200; 5 AEROSOL RESPIRATORY (INHALATION) at 20:25

## 2018-08-12 RX ADMIN — KETOROLAC TROMETHAMINE 15 MG: 15 INJECTION, SOLUTION INTRAMUSCULAR; INTRAVENOUS at 09:16

## 2018-08-12 RX ADMIN — MONTELUKAST SODIUM 10 MG: 10 TABLET, FILM COATED ORAL at 08:10

## 2018-08-12 RX ADMIN — DOXYCYCLINE HYCLATE 100 MG: 100 CAPSULE, GELATIN COATED ORAL at 23:01

## 2018-08-12 RX ADMIN — SODIUM CHLORIDE: 9 INJECTION, SOLUTION INTRAVENOUS at 04:30

## 2018-08-12 RX ADMIN — SODIUM CHLORIDE: 9 INJECTION, SOLUTION INTRAVENOUS at 19:23

## 2018-08-12 RX ADMIN — KETOROLAC TROMETHAMINE 15 MG: 15 INJECTION, SOLUTION INTRAMUSCULAR; INTRAVENOUS at 23:06

## 2018-08-12 RX ADMIN — DILTIAZEM HYDROCHLORIDE 30 MG: 30 TABLET, FILM COATED ORAL at 15:31

## 2018-08-12 RX ADMIN — Medication 5 ML: at 08:40

## 2018-08-12 RX ADMIN — FAMOTIDINE 20 MG: 20 TABLET ORAL at 08:10

## 2018-08-12 RX ADMIN — DOXYCYCLINE HYCLATE 100 MG: 100 CAPSULE, GELATIN COATED ORAL at 11:18

## 2018-08-12 RX ADMIN — IPRATROPIUM BROMIDE AND ALBUTEROL SULFATE 1 AMPULE: .5; 3 SOLUTION RESPIRATORY (INHALATION) at 14:58

## 2018-08-12 RX ADMIN — ONDANSETRON 4 MG: 2 INJECTION INTRAMUSCULAR; INTRAVENOUS at 17:39

## 2018-08-12 RX ADMIN — MOMETASONE FUROATE AND FORMOTEROL FUMARATE DIHYDRATE 2 PUFF: 200; 5 AEROSOL RESPIRATORY (INHALATION) at 10:02

## 2018-08-12 RX ADMIN — IPRATROPIUM BROMIDE AND ALBUTEROL SULFATE 1 AMPULE: .5; 3 SOLUTION RESPIRATORY (INHALATION) at 06:12

## 2018-08-12 RX ADMIN — IPRATROPIUM BROMIDE AND ALBUTEROL SULFATE 1 AMPULE: .5; 3 SOLUTION RESPIRATORY (INHALATION) at 20:25

## 2018-08-12 RX ADMIN — ENOXAPARIN SODIUM 40 MG: 40 INJECTION, SOLUTION INTRAVENOUS; SUBCUTANEOUS at 08:10

## 2018-08-12 RX ADMIN — FAMOTIDINE 20 MG: 20 TABLET ORAL at 20:53

## 2018-08-12 RX ADMIN — METHYLPREDNISOLONE SODIUM SUCCINATE 60 MG: 125 INJECTION, POWDER, FOR SOLUTION INTRAMUSCULAR; INTRAVENOUS at 06:38

## 2018-08-12 ASSESSMENT — PAIN DESCRIPTION - LOCATION
LOCATION: RIB CAGE
LOCATION: RIB CAGE

## 2018-08-12 ASSESSMENT — PAIN DESCRIPTION - ORIENTATION: ORIENTATION: LEFT

## 2018-08-12 ASSESSMENT — PAIN DESCRIPTION - PAIN TYPE
TYPE: ACUTE PAIN
TYPE: ACUTE PAIN

## 2018-08-12 ASSESSMENT — PAIN DESCRIPTION - FREQUENCY: FREQUENCY: INTERMITTENT

## 2018-08-12 ASSESSMENT — PAIN SCALES - GENERAL
PAINLEVEL_OUTOF10: 0
PAINLEVEL_OUTOF10: 2
PAINLEVEL_OUTOF10: 8
PAINLEVEL_OUTOF10: 0
PAINLEVEL_OUTOF10: 0
PAINLEVEL_OUTOF10: 9
PAINLEVEL_OUTOF10: 8

## 2018-08-12 NOTE — PLAN OF CARE
Problem: Pain:  Goal: Pain level will decrease  Pain level will decrease   Outcome: Ongoing  Assess pain every 4 hours and prn using a 0-10 scale. Teach patient adverse complication of uncontrolled pain. Plan patients day so that aggravating activities coincide with peak of analgesic. Patients should be medicated before procedures and activities that incite pain to prevent spikes in pain. Provide patient with distractions of choice such as TV, music or reading. Discuss with patient what a tolerable pain rating would be and work towards that and not just eliminating all pain. Problem: Falls - Risk of:  Goal: Will remain free from falls  Will remain free from falls   Outcome: Ongoing  Patient is alert and oriented and has demonstrated the use of using the call light for assistance before getting up. Education has been provided to defer the use of the bed alarm and/or restraint free alarm as the patient has shown competency in calling for assistance and verbalizing the risk. Problem: Risk for Impaired Skin Integrity  Goal: Tissue integrity - skin and mucous membranes  Structural intactness and normal physiological function of skin and  mucous membranes. Outcome: Ongoing  Skin assessment performed at regular intervals. No redness or open areas noted. Patient reminded to turn and relieve areas known to breakdown. Continue to monitor. Problem: Airway Clearance - Ineffective:  Goal: Ability to maintain a clear airway will improve  Ability to maintain a clear airway will improve   Outcome: Ongoing  Lungs sound diminished through out. Patient continues on 2L NC. Will continue to monitor.

## 2018-08-12 NOTE — PROGRESS NOTES
than 25 ML over past 24 hrs []  Ineffective and, or greater than 25 ml sputum prod. past 24 hrs. []  Nonspon- taneous; Requires suctioning 0   Pulmonary History  (PULM HX) []  No smoking and no chronic pulmonaryhistory []  Former smoker. Quit over 12 mos. ago []  Current smoker or quit w/ in 12 mos []  Pulm. History and, or 20 pk/yr smoking hx [x]  Admitted w/ acute pulm. dx and, or has been admitted w/ pulm. dx 2 or more times over past 12 mos 4   Surgical History this Admit  (SURG HX) [x]  No surgery []  General surgery []  Lower abdominal []  Thoracic or upper abdominal   []  Thoracic w/ pulm. disease 0   Chest X-Ray (CXR)/CT Scan [x]  Clear or not applicable []  Not available []  Atelect- asis or pleural effusions []  Localized infiltrate or pulm. edema []  Con-solidated Infiltrates, bilateral, or in more than 1 lobe 0   Slow or Forced VC, FEV1 OR PEFR (PULM FXN)  [x]  80% or greater, or not indicated []  Pt. unable to perform []  FEV1 or PEFR or VC 51-79%. []  FEV1 or PEFR or VC  30-49%   []  FEV1 or PEFR or VC less than 30%   0   TOTAL ACUITY: 9       CARE PLAN    If Acuity Level is 2, 3, or 4 in any of the following:    [] BILATERAL BREATH SOUNDS (BBS)     [] PULMONARY HISTORY (PULM HX)  [] PULMONARY FUNCTION (PULM FX)    Goal: Improve respiratory functions in patients with airway disease and decrease WOB    [x] AEROSOL PROTOCOL    Total Acuity:   16-32  []  Secondary Assessment in 24 hrs Total Acuity:  9-15  [x]  Secondary Assessment in 24 hrs Total Acuity:  4-8  []  Secondary Assessment in 48 hrs Total Acuity:  0-3  []  Secondary Assessment in 72 hrs   HHN AEROSOL THERAPY with  [physician-ordered bronchodilator(s)] q 4 & Albuterol PRN q2 hrs. Breath-Actuated Neb if BBS Acuity = 4, and pt. can use MP. Notify physician if condition deteriorates. HHN AEROSOL THERAPY with  [physician-ordered bronchodilator(s)]  QID and Albuterol PRN q4 hrs.    Breath-Actuated Neb if BBS Acuity = 4, and pt. can use MP.  Notify physician if condition deteriorates. MDI THERAPY with  2 actuations of [physician-ordered bronchodilator(s)] via spacer TID Albuterol and PRNq4 hrs. If unable to utilize MDI: HHN [physician-ordered bronchodilator(s)] TID and Albuterol PRN q4 hrs. Notify physician if condition deteriorates. MDI THERAPY with  [physician-ordered bronchodilator(s)] via spacer TID PRN. If unable to utilize MDI: HHN [physician-ordered bronchodilator(s)] TID PRN. Notify physician if condition deteriorates. If Acuity Level is 2, 3, or 4 in any of the following:    [] COUGH     [] SURGICAL HISTORY (SURG HX)  [] CHEST XRAY (CXR)    Goal: Improvement in sputum mobilization in patients with ineffective airway clearance. Reverse atelectasis. [] Bronchopulmonary Hygiene Protocol    Total Acuity:   16-32  []  Secondary Assessment in 24 hrs Total Acuity:  9-15  []  Secondary Assessment in 24 hrs Total Acuity:  4-8  []  Secondary Assessment in 48 hrs Total Acuity:  0-3  []  Secondary Assessment in 72 hrs   METANEB QID with [physician-ordered bronchodilator(s)] if CXR Acuity = 4; otherwise:  PD&P, PEP, or Vest QID & PRN  NT Sxn PRN for ineffective cough  METANEB QID with [physician-ordered bronchodilator(s)] if CXR Acuity = 4; otherwise:  PD&P, PEP, or Vest TID & PRN  NT Sxn PRN for ineffective cough  Instruct patient to self-perform IS q1hr WA   Directed Cough self-performed q1hr WA     If Acuity Level is 2 or above in the following:    [] PULMONARY HISTORY (PULM HX)    Goal: Assist patient in quitting smoking to slow or stop the progression of lung disease.     [] Smoking Cessation Protocol    SMOKING CESSATION EDUCATION provided according to policy LG_855: (delgado with an X)  ____Yes    ____ No     ____ NA    Smoking Cessation Booklet given:  ____Yes  ____No ____Patient Mady Aloe

## 2018-08-12 NOTE — PROGRESS NOTES
Progress Note    SUBJECTIVE: Patient is seen for Principal Problem:    Acute on chronic respiratory failure (HCC)  Active Problems:    Paroxysmal atrial fibrillation (HCC)    COPD, severe (HCC)    Essential hypertension    Dyslipidemia    COPD exacerbation (HCC)    Severe malnutrition (HCC)    Chronic respiratory failure with hypoxia (HCC)    Tobacco abuse  Resolved Problems:    * No resolved hospital problems. *     he had afib with rvr- responded to lopresor,dig and fluid bolus. He has sinus tachycardia  Continues to be short of breath om minimal exertion and becomes tachycardic    OBJECTIVE:    Vitals:   Vitals:    08/12/18 0916   BP: (!) 147/103   Pulse: 120   Resp:    Temp:    SpO2: 98%     Weight: 101 lb 8 oz (46 kg)   Height: 5' 6\" (167.6 cm)   -----------------------------------------------------------------  Exam:    CONSTITUTIONAL:  awake, alert, cooperative, no apparent distress, and appears stated age  EYES:  Lids and lashes normal, pupils equal, round and reactive to light, extra ocular muscles intact, sclera clear, conjunctiva normal  ENT:  Normocephalic, without obvious abnormality, atraumatic, sinuses nontender on palpation, external ears without lesions, oral pharynx with moist mucus membranes, tonsils without erythema or exudates, gums normal and good dentition. NECK:  Supple, symmetrical, trachea midline, no adenopathy, thyroid symmetric, not enlarged and no tenderness, skin normal  HEMATOLOGIC/LYMPHATICS:  no cervical lymphadenopathy  LUNGS:  Diminished air entry bilat,using accessory muscles,has minimal wheezing  CARDIOVASCULAR:  Sinus tachycardia,no gallop,no murmer  ABDOMEN:  No scars, normal bowel sounds, soft, non-distended, non-tender, no masses palpated, no hepatosplenomegally    MUSCULOSKELETAL:  there is no redness, warmth, or swelling of the joints  NEUROLOGIC:  Awake, alert, oriented to name, place and time. Cranial nerves II-XII are grossly intact. Motor is 5 out of 5 bilaterally. Cerebellar finger to nose, heel to shin intact. Sensory is intact.   Babinski down going, Romberg negative, and gait is normal.  SKIN:  no bruising or bleeding  EXT:     no cyanosis, clubbing or edema present    -----------------------------------------------------------------  Diagnostic Data: Reviewed    More Recent Labs:    Results for orders placed or performed during the hospital encounter of 08/09/18   CBC Auto Differential   Result Value Ref Range    WBC 11.0 3.5 - 11.3 k/uL    RBC 4.11 (L) 4.21 - 5.77 m/uL    Hemoglobin 13.9 13.0 - 17.0 g/dL    Hematocrit 41.4 40.7 - 50.3 %    .7 82.6 - 102.9 fL    MCH 33.8 (H) 25.2 - 33.5 pg    MCHC 33.6 28.4 - 34.8 g/dL    RDW 11.9 11.8 - 14.4 %    Platelets 573 232 - 455 k/uL    MPV 9.8 8.1 - 13.5 fL    NRBC Automated 0.0 0.0 per 100 WBC    Differential Type NOT REPORTED     Seg Neutrophils 79 (H) 36 - 65 %    Lymphocytes 13 (L) 24 - 43 %    Monocytes 7 3 - 12 %    Eosinophils % 1 1 - 4 %    Basophils 0 0 - 2 %    Immature Granulocytes 0 0 %    Segs Absolute 8.56 (H) 1.50 - 8.10 k/uL    Absolute Lymph # 1.47 1.10 - 3.70 k/uL    Absolute Mono # 0.78 0.10 - 1.20 k/uL    Absolute Eos # 0.15 0.00 - 0.44 k/uL    Basophils # 0.04 0.00 - 0.20 k/uL    Absolute Immature Granulocyte 0.03 0.00 - 0.30 k/uL    WBC Morphology NOT REPORTED     RBC Morphology NOT REPORTED     Platelet Estimate NOT REPORTED    Basic Metabolic Panel   Result Value Ref Range    Glucose 139 (H) 70 - 99 mg/dL    BUN 17 6 - 20 mg/dL    CREATININE 0.55 (L) 0.70 - 1.20 mg/dL    Bun/Cre Ratio 31 (H) 9 - 20    Calcium 9.6 8.6 - 10.4 mg/dL    Sodium 139 135 - 144 mmol/L    Potassium 4.5 3.7 - 5.3 mmol/L    Chloride 93 (L) 98 - 107 mmol/L    CO2 39 (H) 20 - 31 mmol/L    Anion Gap 7 (L) 9 - 17 mmol/L    GFR Non-African American >60 >60 mL/min    GFR African American >60 >60 mL/min    GFR Comment          GFR Staging         Troponin   Result Value Ref Range    Troponin T <0.03 <0.03 ng/mL    Troponin Interp Art, Temp Adj NOT REPORTED     pO2, Art, Temp Adj NOT REPORTED 80.0 - 100.0 mmHg    O2 Device/Flow/% BIPAP     Respiratory Rate NOT REPORTED     Robert Test PASS     Sample Site Left Radial Artery     Pt.  Position SEMI-FOWLERS     Mode NOT REPORTED     Set Rate NOT REPORTED     Total Rate NOT REPORTED     VT NOT REPORTED     FIO2 NOT REPORTED     Peep/Cpap NOT REPORTED     PSV NOT REPORTED     Text for Respiratory BIPAP 30%, 14/7     NOTIFICATION NOT REPORTED     NOTIFICATION TIME NOT REPORTED    CBC auto differential   Result Value Ref Range    WBC 3.4 (L) 3.5 - 11.3 k/uL    RBC 3.91 (L) 4.21 - 5.77 m/uL    Hemoglobin 12.9 (L) 13.0 - 17.0 g/dL    Hematocrit 39.2 (L) 40.7 - 50.3 %    .3 82.6 - 102.9 fL    MCH 33.0 25.2 - 33.5 pg    MCHC 32.9 28.4 - 34.8 g/dL    RDW 11.9 11.8 - 14.4 %    Platelets 251 752 - 777 k/uL    MPV 10.0 8.1 - 13.5 fL    NRBC Automated 0.0 0.0 per 100 WBC    Differential Type NOT REPORTED     WBC Morphology NOT REPORTED     RBC Morphology NOT REPORTED     Platelet Estimate NOT REPORTED     Seg Neutrophils 78 (H) 36 - 65 %    Lymphocytes 19 (L) 24 - 43 %    Monocytes 3 3 - 12 %    Eosinophils % 0 (L) 1 - 4 %    Immature Granulocytes 0 0 %    Basophils 0 0 - 2 %    Segs Absolute 2.65 1.50 - 8.10 k/uL    Absolute Lymph # 0.65 (L) 1.10 - 3.70 k/uL    Absolute Mono # 0.10 0.10 - 1.20 k/uL    Absolute Eos # 0.00 0.00 - 0.44 k/uL    Absolute Immature Granulocyte 0.00 0.00 - 0.30 k/uL    Basophils # 0.00 0.0 - 0.2 k/uL    Morphology Normal    Basic Metabolic Panel w/ Reflex to MG   Result Value Ref Range    Glucose 134 (H) 70 - 99 mg/dL    BUN 17 6 - 20 mg/dL    CREATININE 0.60 (L) 0.70 - 1.20 mg/dL    Bun/Cre Ratio 28 (H) 9 - 20    Calcium 9.5 8.6 - 10.4 mg/dL    Sodium 139 135 - 144 mmol/L    Potassium 4.8 3.7 - 5.3 mmol/L    Chloride 93 (L) 98 - 107 mmol/L    CO2 40 (H) 20 - 31 mmol/L    Anion Gap 6 (L) 9 - 17 mmol/L    GFR Non-African American >60 >60 mL/min    GFR African American >60 >60 mL/min    GFR Comment          GFR Staging         CBC auto differential   Result Value Ref Range    WBC 10.9 3.5 - 11.3 k/uL    RBC 3.41 (L) 4.21 - 5.77 m/uL    Hemoglobin 11.4 (L) 13.0 - 17.0 g/dL    Hematocrit 33.3 (L) 40.7 - 50.3 %    MCV 97.7 82.6 - 102.9 fL    MCH 33.4 25.2 - 33.5 pg    MCHC 34.2 28.4 - 34.8 g/dL    RDW 12.3 11.8 - 14.4 %    Platelets 923 800 - 562 k/uL    MPV 9.6 8.1 - 13.5 fL    NRBC Automated 0.0 0.0 per 100 WBC    Differential Type NOT REPORTED     WBC Morphology NOT REPORTED     RBC Morphology NOT REPORTED     Platelet Estimate NOT REPORTED     Seg Neutrophils 88 (H) 36 - 65 %    Lymphocytes 7 (L) 24 - 43 %    Monocytes 5 3 - 12 %    Eosinophils % 0 (L) 1 - 4 %    Basophils 0 0 - 2 %    Immature Granulocytes 0 0 %    Segs Absolute 9.65 (H) 1.50 - 8.10 k/uL    Absolute Lymph # 0.73 (L) 1.10 - 3.70 k/uL    Absolute Mono # 0.52 0.10 - 1.20 k/uL    Absolute Eos # <0.03 0.00 - 0.44 k/uL    Basophils # <0.03 0.00 - 0.20 k/uL    Absolute Immature Granulocyte 0.03 0.00 - 0.30 k/uL   Basic Metabolic Panel w/ Reflex to MG   Result Value Ref Range    Glucose 105 (H) 70 - 99 mg/dL    BUN 22 (H) 6 - 20 mg/dL    CREATININE 0.55 (L) 0.70 - 1.20 mg/dL    Bun/Cre Ratio 40 (H) 9 - 20    Calcium 8.9 8.6 - 10.4 mg/dL    Sodium 139 135 - 144 mmol/L    Potassium 4.0 3.7 - 5.3 mmol/L    Chloride 99 98 - 107 mmol/L    CO2 34 (H) 20 - 31 mmol/L    Anion Gap 6 (L) 9 - 17 mmol/L    GFR Non-African American >60 >60 mL/min    GFR African American >60 >60 mL/min    GFR Comment          GFR Staging         CBC auto differential   Result Value Ref Range    WBC 10.0 3.5 - 11.3 k/uL    RBC 3.44 (L) 4.21 - 5.77 m/uL    Hemoglobin 11.4 (L) 13.0 - 17.0 g/dL    Hematocrit 35.0 (L) 40.7 - 50.3 %    .7 82.6 - 102.9 fL    MCH 33.1 25.2 - 33.5 pg    MCHC 32.6 28.4 - 34.8 g/dL    RDW 13.0 11.8 - 14.4 %    Platelets 011 024 - 403 k/uL    MPV 10.0 8.1 - 13.5 fL    NRBC Automated 0.0 0.0 per 100 WBC    Differential Type NOT Date Noted    Paroxysmal atrial fibrillation ([facility] 08/11/2018     Priority: High     Class: Acute    Tobacco abuse 08/10/2018    Acute on chronic respiratory failure ([facility] 08/09/2018    Severe malnutrition ([facility] 06/11/2018    Chronic respiratory failure with hypoxia (HCC) 06/11/2018    COPD exacerbation ([facility] 06/09/2018    Dyslipidemia 04/11/2017    COPD, severe ([facility] 03/28/2017    Essential hypertension 03/28/2017         PLAN:  · Add eliquis  · Cardiology consult  · Echo  · Po dig  · Continue iv steroids,aerosols and bipap  · Total time spent 45 min      Kinga Wheeler M.D.

## 2018-08-12 NOTE — PROGRESS NOTES
Pumps: 15x  Comments: Above exercises completed in supine. Frequent rest breaks needed d/t HERRERA/pain. SPO2 95% or higher while completing ther ex. Assessment      Patient Education: Educated pt on safety with transfers/amb with pt demonstrating fair to good understanding. Also issued discharged folder and educated pt on contents. Pt with good understanding. REQUIRES PT FOLLOW UP: Yes  Activity Tolerance  Activity Tolerance: Patient limited by pain; Patient limited by endurance     G-Code     OutComes Score    AM-PAC Score    Goals  Short term goals  Time Frame for Short term goals: 14 visits   Short term goal 1: Patient will demonstrate the ability to ambulate 50ftx1 with LRAD with SBAx1 in order to ease ADLS   Short term goal 2: Patient will demonstrate the ability to complete bed mobility and transfers with SBAx1 in order to ease functional mobility   Short term goal 3: Patient will demonstrate the ability to ascend/descend 5 steps with use of HR and supervision assistance in order to safely get in/out of the home   Short term goal 4: Patient will tolerate 35-45' ther-ex in order to increase endurance and ease ADLs     Plan    Plan  Times per week: 7x/week   Times per day: Twice a day (1x/day on weekends)  Current Treatment Recommendations: Strengthening, Balance Training, Functional Mobility Training, Transfer Training, Endurance Training, Gait Training, Stair training, Neuromuscular Re-education, Pain Management, Home Exercise Program, Safety Education & Training, Patient/Caregiver Education & Training, Equipment Evaluation, Education, & procurement  Safety Devices  Type of devices:  All fall risk precautions in place, Call light within reach, Nurse notified, Left in bed (No alarm upon entry.)     Therapy Time   Individual Concurrent Group Co-treatment   Time In 1505         Time Out [address], [place]

## 2018-08-12 NOTE — PROGRESS NOTES
Occupational Therapy  Facility/Department: [facility] AT THE [facility]  Daily Treatment Note  NAME: Radha Garay  : 1965  MRN: [id number]    Date of Service: 2018    Discharge Recommendations:  Continue to assess pending progress       Patient Diagnosis(es): The primary encounter diagnosis was Acute respiratory failure with hypercapnia ([address] Diagnoses of Anxiety, Left-sided chest wall pain, and History of COPD were also pertinent to this visit. has a past medical history of Arthritis; COPD (chronic obstructive pulmonary disease) ([address] Hypertension; and Scoliosis. has a past surgical history that includes Tonsillectomy; other surgical history (Left, 2017); and Facial reconstruction surgery (Left, 2017). Restrictions  Restrictions/Precautions  Restrictions/Precautions: General Precautions, Fall Risk  Subjective   General  Chart Reviewed: Yes  Patient assessed for rehabilitation services?: Yes  Response to previous treatment: Patient with no complaints from previous session  Family / Caregiver Present: No  Subjective  Subjective: Pt sitting up in bed upon arrival. Pt agreed to participate in OT session. Pt reported 8/10 pain in ribs and pt stated he had pain medicine. Orientation     Objective                                                                Type of ROM/Therapeutic Exercise  Type of ROM/Therapeutic Exercise: AROM; Free weights  Comment: BUE ther ex with 1# dumbbell x 5 planes x 15 reps x 1 set to increase UE strength and endurance in order to increase Ind with ADL tasks. Pt required mod RBs as needed d/t fatigue and min SOB. O2 96-98% on 2L of continuous O2. Assessment      Safety Devices  Safety Devices in place: Yes  Type of devices: Call light within reach; Left in bed  Restraints  Initially in place: Yes          Plan   Plan  Times per day: Daily  Current Treatment Recommendations: Strengthening, Balance Training, Functional Mobility Training, Endurance Training,

## 2018-08-13 ENCOUNTER — APPOINTMENT (OUTPATIENT)
Dept: NON INVASIVE DIAGNOSTICS | Age: 53
DRG: 189 | End: 2018-08-13
Payer: MEDICARE

## 2018-08-13 LAB
ALLEN TEST: ABNORMAL
ANION GAP SERPL CALCULATED.3IONS-SCNC: 9 MMOL/L (ref 9–17)
BUN BLDV-MCNC: 16 MG/DL (ref 6–20)
BUN/CREAT BLD: 29 (ref 9–20)
CALCIUM SERPL-MCNC: 8.7 MG/DL (ref 8.6–10.4)
CARBOXYHEMOGLOBIN: ABNORMAL % (ref 0–5)
CHLORIDE BLD-SCNC: 103 MMOL/L (ref 98–107)
CO2: 32 MMOL/L (ref 20–31)
CREAT SERPL-MCNC: 0.56 MG/DL (ref 0.7–1.2)
EKG ATRIAL RATE: 105 BPM
EKG ATRIAL RATE: 192 BPM
EKG P AXIS: 82 DEGREES
EKG P-R INTERVAL: 134 MS
EKG Q-T INTERVAL: 222 MS
EKG Q-T INTERVAL: 320 MS
EKG QRS DURATION: 60 MS
EKG QRS DURATION: 82 MS
EKG QTC CALCULATION (BAZETT): 365 MS
EKG QTC CALCULATION (BAZETT): 422 MS
EKG R AXIS: 47 DEGREES
EKG R AXIS: 49 DEGREES
EKG T AXIS: 78 DEGREES
EKG T AXIS: 81 DEGREES
EKG VENTRICULAR RATE: 105 BPM
EKG VENTRICULAR RATE: 163 BPM
FIO2: ABNORMAL
GFR AFRICAN AMERICAN: >60 ML/MIN
GFR NON-AFRICAN AMERICAN: >60 ML/MIN
GFR SERPL CREATININE-BSD FRML MDRD: ABNORMAL ML/MIN/{1.73_M2}
GFR SERPL CREATININE-BSD FRML MDRD: ABNORMAL ML/MIN/{1.73_M2}
GLUCOSE BLD-MCNC: 109 MG/DL (ref 70–99)
HCO3 ARTERIAL: 28.4 MMOL/L (ref 22–26)
LV EF: 70 %
LVEF MODALITY: NORMAL
METHEMOGLOBIN: ABNORMAL % (ref 0–1.9)
MODE: ABNORMAL
NEGATIVE BASE EXCESS, ART: ABNORMAL MMOL/L (ref 0–2)
NOTIFICATION TIME: ABNORMAL
NOTIFICATION: ABNORMAL
O2 DEVICE/FLOW/%: ABNORMAL
O2 SAT, ARTERIAL: 94.6 % (ref 95–98)
OXYHEMOGLOBIN: ABNORMAL % (ref 95–98)
PATIENT TEMP: 37
PCO2 ARTERIAL: 43.6 MMHG (ref 35–45)
PCO2, ART, TEMP ADJ: ABNORMAL
PEEP/CPAP: ABNORMAL
PH ARTERIAL: 7.43 (ref 7.35–7.45)
PH, ART, TEMP ADJ: ABNORMAL (ref 7.35–7.45)
PO2 ARTERIAL: 70.7 MMHG (ref 80–100)
PO2, ART, TEMP ADJ: ABNORMAL MMHG (ref 80–100)
POSITIVE BASE EXCESS, ART: 3.5 MMOL/L (ref 0–2)
POTASSIUM SERPL-SCNC: 4.3 MMOL/L (ref 3.7–5.3)
PSV: ABNORMAL
PT. POSITION: ABNORMAL
RESPIRATORY RATE: ABNORMAL
SAMPLE SITE: ABNORMAL
SET RATE: ABNORMAL
SODIUM BLD-SCNC: 144 MMOL/L (ref 135–144)
TEXT FOR RESPIRATORY: ABNORMAL
TOTAL HB: ABNORMAL G/DL (ref 12–16)
TOTAL RATE: ABNORMAL
VT: ABNORMAL

## 2018-08-13 PROCEDURE — 94664 DEMO&/EVAL PT USE INHALER: CPT

## 2018-08-13 PROCEDURE — 80048 BASIC METABOLIC PNL TOTAL CA: CPT

## 2018-08-13 PROCEDURE — 6370000000 HC RX 637 (ALT 250 FOR IP): Performed by: PHYSICIAN ASSISTANT

## 2018-08-13 PROCEDURE — 36415 COLL VENOUS BLD VENIPUNCTURE: CPT

## 2018-08-13 PROCEDURE — 82805 BLOOD GASES W/O2 SATURATION: CPT

## 2018-08-13 PROCEDURE — 97535 SELF CARE MNGMENT TRAINING: CPT

## 2018-08-13 PROCEDURE — 97116 GAIT TRAINING THERAPY: CPT

## 2018-08-13 PROCEDURE — 6370000000 HC RX 637 (ALT 250 FOR IP): Performed by: INTERNAL MEDICINE

## 2018-08-13 PROCEDURE — 6370000000 HC RX 637 (ALT 250 FOR IP): Performed by: FAMILY MEDICINE

## 2018-08-13 PROCEDURE — 2000000000 HC ICU R&B

## 2018-08-13 PROCEDURE — 36600 WITHDRAWAL OF ARTERIAL BLOOD: CPT

## 2018-08-13 PROCEDURE — 6360000002 HC RX W HCPCS: Performed by: FAMILY MEDICINE

## 2018-08-13 PROCEDURE — 97110 THERAPEUTIC EXERCISES: CPT

## 2018-08-13 PROCEDURE — 6360000002 HC RX W HCPCS: Performed by: INTERNAL MEDICINE

## 2018-08-13 PROCEDURE — 94640 AIRWAY INHALATION TREATMENT: CPT

## 2018-08-13 PROCEDURE — 99222 1ST HOSP IP/OBS MODERATE 55: CPT | Performed by: INTERNAL MEDICINE

## 2018-08-13 PROCEDURE — 2580000003 HC RX 258: Performed by: FAMILY MEDICINE

## 2018-08-13 PROCEDURE — 93306 TTE W/DOPPLER COMPLETE: CPT

## 2018-08-13 RX ORDER — CALCIUM CARBONATE 200(500)MG
500 TABLET,CHEWABLE ORAL 3 TIMES DAILY PRN
Status: DISCONTINUED | OUTPATIENT
Start: 2018-08-13 | End: 2018-08-20 | Stop reason: HOSPADM

## 2018-08-13 RX ORDER — METHYLPREDNISOLONE SODIUM SUCCINATE 125 MG/2ML
60 INJECTION, POWDER, LYOPHILIZED, FOR SOLUTION INTRAMUSCULAR; INTRAVENOUS EVERY 6 HOURS
Status: DISCONTINUED | OUTPATIENT
Start: 2018-08-13 | End: 2018-08-17

## 2018-08-13 RX ORDER — DILTIAZEM HYDROCHLORIDE 60 MG/1
60 TABLET, FILM COATED ORAL EVERY 8 HOURS SCHEDULED
Status: DISCONTINUED | OUTPATIENT
Start: 2018-08-13 | End: 2018-08-15

## 2018-08-13 RX ORDER — TRAMADOL HYDROCHLORIDE 50 MG/1
50 TABLET ORAL EVERY 6 HOURS PRN
Status: DISCONTINUED | OUTPATIENT
Start: 2018-08-13 | End: 2018-08-20 | Stop reason: HOSPADM

## 2018-08-13 RX ADMIN — IPRATROPIUM BROMIDE AND ALBUTEROL SULFATE 1 AMPULE: .5; 3 SOLUTION RESPIRATORY (INHALATION) at 16:09

## 2018-08-13 RX ADMIN — METHYLPREDNISOLONE SODIUM SUCCINATE 60 MG: 125 INJECTION, POWDER, FOR SOLUTION INTRAMUSCULAR; INTRAVENOUS at 20:01

## 2018-08-13 RX ADMIN — APIXABAN 5 MG: 5 TABLET, FILM COATED ORAL at 07:51

## 2018-08-13 RX ADMIN — TRAMADOL HYDROCHLORIDE 50 MG: 50 TABLET, FILM COATED ORAL at 12:52

## 2018-08-13 RX ADMIN — IPRATROPIUM BROMIDE AND ALBUTEROL SULFATE 1 AMPULE: .5; 3 SOLUTION RESPIRATORY (INHALATION) at 10:18

## 2018-08-13 RX ADMIN — METHYLPREDNISOLONE SODIUM SUCCINATE 80 MG: 125 INJECTION, POWDER, FOR SOLUTION INTRAMUSCULAR; INTRAVENOUS at 06:56

## 2018-08-13 RX ADMIN — MONTELUKAST SODIUM 10 MG: 10 TABLET, FILM COATED ORAL at 07:51

## 2018-08-13 RX ADMIN — DILTIAZEM HYDROCHLORIDE 30 MG: 30 TABLET, FILM COATED ORAL at 05:25

## 2018-08-13 RX ADMIN — METHYLPREDNISOLONE SODIUM SUCCINATE 60 MG: 125 INJECTION, POWDER, FOR SOLUTION INTRAMUSCULAR; INTRAVENOUS at 12:52

## 2018-08-13 RX ADMIN — IPRATROPIUM BROMIDE AND ALBUTEROL SULFATE 1 AMPULE: .5; 3 SOLUTION RESPIRATORY (INHALATION) at 20:27

## 2018-08-13 RX ADMIN — DILTIAZEM HYDROCHLORIDE 60 MG: 60 TABLET, FILM COATED ORAL at 13:26

## 2018-08-13 RX ADMIN — MOMETASONE FUROATE AND FORMOTEROL FUMARATE DIHYDRATE 2 PUFF: 200; 5 AEROSOL RESPIRATORY (INHALATION) at 10:18

## 2018-08-13 RX ADMIN — SODIUM CHLORIDE: 9 INJECTION, SOLUTION INTRAVENOUS at 13:19

## 2018-08-13 RX ADMIN — IPRATROPIUM BROMIDE AND ALBUTEROL SULFATE 1 AMPULE: .5; 3 SOLUTION RESPIRATORY (INHALATION) at 06:00

## 2018-08-13 RX ADMIN — DIGOXIN 125 MCG: 0.12 TABLET ORAL at 07:51

## 2018-08-13 RX ADMIN — DOXYCYCLINE HYCLATE 100 MG: 100 CAPSULE, GELATIN COATED ORAL at 12:06

## 2018-08-13 RX ADMIN — MOMETASONE FUROATE AND FORMOTEROL FUMARATE DIHYDRATE 2 PUFF: 200; 5 AEROSOL RESPIRATORY (INHALATION) at 20:27

## 2018-08-13 RX ADMIN — DOXYCYCLINE HYCLATE 100 MG: 100 CAPSULE, GELATIN COATED ORAL at 22:30

## 2018-08-13 RX ADMIN — LOSARTAN POTASSIUM 100 MG: 50 TABLET ORAL at 07:51

## 2018-08-13 RX ADMIN — CALCIUM CARBONATE 500 MG: 500 TABLET, CHEWABLE ORAL at 17:50

## 2018-08-13 RX ADMIN — FAMOTIDINE 20 MG: 20 TABLET ORAL at 20:00

## 2018-08-13 RX ADMIN — DILTIAZEM HYDROCHLORIDE 60 MG: 60 TABLET, FILM COATED ORAL at 22:30

## 2018-08-13 RX ADMIN — TRAMADOL HYDROCHLORIDE 50 MG: 50 TABLET, FILM COATED ORAL at 20:05

## 2018-08-13 RX ADMIN — FAMOTIDINE 20 MG: 20 TABLET ORAL at 07:51

## 2018-08-13 RX ADMIN — APIXABAN 5 MG: 5 TABLET, FILM COATED ORAL at 20:00

## 2018-08-13 ASSESSMENT — PAIN DESCRIPTION - PAIN TYPE
TYPE: ACUTE PAIN

## 2018-08-13 ASSESSMENT — PAIN DESCRIPTION - ORIENTATION
ORIENTATION: LEFT

## 2018-08-13 ASSESSMENT — PAIN SCALES - GENERAL
PAINLEVEL_OUTOF10: 9
PAINLEVEL_OUTOF10: 9
PAINLEVEL_OUTOF10: 0
PAINLEVEL_OUTOF10: 4
PAINLEVEL_OUTOF10: 4
PAINLEVEL_OUTOF10: 9
PAINLEVEL_OUTOF10: 4

## 2018-08-13 ASSESSMENT — PAIN DESCRIPTION - LOCATION
LOCATION: RIB CAGE

## 2018-08-13 ASSESSMENT — PAIN DESCRIPTION - DESCRIPTORS
DESCRIPTORS: SHARP;THROBBING
DESCRIPTORS: THROBBING

## 2018-08-13 NOTE — PROGRESS NOTES
Patient has decided on going to SNF. He picked [facility]. Referral made to SNF and paper work fax.     ESTER Sanchez

## 2018-08-13 NOTE — PROGRESS NOTES
Palliative Care Notes    Reason for Consult:  Chronic disease support--COPD    Patient Active Problem List   Diagnosis    [facility]    Essential hypertension    Dyslipidemia    COPD exacerbation ([facility]    [facility] ([facility]    Chronic respiratory failure with hypoxia ([facility]    Acute on chronic respiratory failure (HCC)    Tobacco abuse    Paroxysmal atrial fibrillation ([facility]       Advance Directives:  Code status: Full Code  Patient has capacity for medical decisions: yes  Health Care Power of : yes  Living Will: yes        Pain Management:  Pain is controlled with current analgesics. Medication(s) being used: toradol. Symptom Management:  Are there any other symptoms that are distressing to the patient or family that needs addressed? Anxiety:  chronic anxiety                          Dyspnea:  chronic dyspnea and non-productive cough                          Fatigue:  exercise intolerance                          Bladder function:  denies problems                          Bowel function:  poor appetite    Other:  depression     Spiritual history/needs:   notified: n/a  See Spiritual Screening Flowsheet    Palliative Performance Scale:  ___70%  Ambulation reduced; Some disease; Can't do normal job or work; intake normal or reduced; can do full self care; LOC full  ___60%  Ambulation reduced; Significant disease; Can't do hobbies/housework; intake normal or reduced; occasional assist; LOC full/confusion  _x__50%  Mainly sit/lie; Extensive disease; Can't do any work; Considerable assist; intake normal or reduced; LOC full/confusion  ___40%  Mainly in bed; Extensive disease; Mainly assist; intake normal or reduced; LOC full/confusion   ___30%  Bed Bound; Extensive disease; Total care; intake reduced; LOCfull/confusion  ___20%  Bed Bound; Extensive disease; Total care; intake minimal; Drowsy/coma  ___10%  Bed Bound;  Extensive disease; 08/11/18 101 lb 8 oz (46 kg)   06/21/18 94 lb 9.6 oz (42.9 kg)   06/11/18 95 lb (43.1 kg)   We discuss outpatient palliative care. He tells me that he still has information on this at home. Tells me that he thinks he is getting closer to end of life. We discuss possibly going to rehab as he was unable to go to pulmonary rehab. Benefits of going to rehab discuss with strengthening, helping to manage disease. He is considering going. States he would like a referral to [facility] services for symptom management and support. He admits that he is afraid of dying. States that he is planning on being cremated and has been Kelsea Cantu" on it. States he wants to be cremated with a couple of hot dogs and a beer\". States he is not afraid of being dead, just the process of dying. We discuss management of symptoms through palliative care and hospice care. Feels bad that he got out of control in xray when he first came to the hospital, admitted overwhelming anxiety related to dyspnea. Tells me that he is feeling better being on the steroids also. Was asked if he discussed his anxiety and depression with his PCP. He states \"I really didn't go into a lot of detail\". Agreeable to referral for outpatient palliative care. Will forward note to his PCP. ACP discussion. He has advance directives, which he states have no changes. DNR discussion. He does not wish to be intubated and states he girlfriend knows that he would not want to live on a ventilator long term. Is still agreeable to CPR, even though may end up on a ventilator. Needs ongoing DNR discussion. Has an appt with pulmonology later this month. Agreeable to use of BiPap. His pCO2 much improved with use of BiPap on admission. Tells me that he is using his O2 at home. Results for Jason Coello (MRN [id number]) as of 8/13/2018 10:37   Ref.  Range 8/9/2018 21:00 8/10/2018 05:30 8/13/2018 08:50   pH, Arterial Latest Ref Range: 7.35 -

## 2018-08-13 NOTE — PROGRESS NOTES
Ambulated in room to doorway with PT, wearing oxygen at 2 liters nasal cannula. Monitor shows sinus tachycardia with heart rate to 120's. SAO2 drops to mid 80's. Dyspnea with exertion.

## 2018-08-13 NOTE — PROGRESS NOTES
than 25 ML over past 24 hrs []  Ineffective and, or greater than 25 ml sputum prod. past 24 hrs. []  Nonspon- taneous; Requires suctioning 1   Pulmonary History  (PULM HX) []  No smoking and no chronic pulmonaryhistory []  Former smoker. Quit over 12 mos. ago []  Current smoker or quit w/ in 12 mos []  Pulm. History and, or 20 pk/yr smoking hx [x]  Admitted w/ acute pulm. dx and, or has been admitted w/ pulm. dx 2 or more times over past 12 mos 4   Surgical History this Admit  (SURG HX) [x]  No surgery []  General surgery []  Lower abdominal []  Thoracic or upper abdominal   []  Thoracic w/ pulm. disease 0   Chest X-Ray (CXR)/CT Scan [x]  Clear or not applicable []  Not available []  Atelect- asis or pleural effusions []  Localized infiltrate or pulm. edema []  Con-solidated Infiltrates, bilateral, or in more than 1 lobe 0   Slow or Forced VC, FEV1 OR PEFR (PULM FXN)  [x]  80% or greater, or not indicated []  Pt. unable to perform []  FEV1 or PEFR or VC 51-79%. []  FEV1 or PEFR or VC  30-49%   []  FEV1 or PEFR or VC less than 30%   0   TOTAL ACUITY: 9       CARE PLAN    If Acuity Level is 2, 3, or 4 in any of the following:    [] BILATERAL BREATH SOUNDS (BBS)     [] PULMONARY HISTORY (PULM HX)  [] PULMONARY FUNCTION (PULM FX)    Goal: Improve respiratory functions in patients with airway disease and decrease WOB    [x] AEROSOL PROTOCOL    Total Acuity:   16-32  []  Secondary Assessment in 24 hrs Total Acuity:  9-15  [x]  Secondary Assessment in 24 hrs Total Acuity:  4-8  []  Secondary Assessment in 48 hrs Total Acuity:  0-3  []  Secondary Assessment in 72 hrs   HHN AEROSOL THERAPY with  [physician-ordered bronchodilator(s)] q 4 & Albuterol PRN q2 hrs. Breath-Actuated Neb if BBS Acuity = 4, and pt. can use MP. Notify physician if condition deteriorates. HHN AEROSOL THERAPY with  [physician-ordered bronchodilator(s)]  QID and Albuterol PRN q4 hrs.    Breath-Actuated Neb if BBS Acuity = 4, and pt. can use MP.  Notify physician if condition deteriorates. MDI THERAPY with  2 actuations of [physician-ordered bronchodilator(s)] via spacer TID Albuterol and PRNq4 hrs. If unable to utilize MDI: HHN [physician-ordered bronchodilator(s)] TID and Albuterol PRN q4 hrs. Notify physician if condition deteriorates. MDI THERAPY with  [physician-ordered bronchodilator(s)] via spacer TID PRN. If unable to utilize MDI: HHN [physician-ordered bronchodilator(s)] TID PRN. Notify physician if condition deteriorates. If Acuity Level is 2, 3, or 4 in any of the following:    [] COUGH     [] SURGICAL HISTORY (SURG HX)  [] CHEST XRAY (CXR)    Goal: Improvement in sputum mobilization in patients with ineffective airway clearance. Reverse atelectasis. [] Bronchopulmonary Hygiene Protocol    Total Acuity:   16-32  []  Secondary Assessment in 24 hrs Total Acuity:  9-15  []  Secondary Assessment in 24 hrs Total Acuity:  4-8  []  Secondary Assessment in 48 hrs Total Acuity:  0-3  []  Secondary Assessment in 72 hrs   METANEB QID with [physician-ordered bronchodilator(s)] if CXR Acuity = 4; otherwise:  PD&P, PEP, or Vest QID & PRN  NT Sxn PRN for ineffective cough  METANEB QID with [physician-ordered bronchodilator(s)] if CXR Acuity = 4; otherwise:  PD&P, PEP, or Vest TID & PRN  NT Sxn PRN for ineffective cough  Instruct patient to self-perform IS q1hr WA   Directed Cough self-performed q1hr WA     If Acuity Level is 2 or above in the following:    [] PULMONARY HISTORY (PULM HX)    Goal: Assist patient in quitting smoking to slow or stop the progression of lung disease.     [] Smoking Cessation Protocol    SMOKING CESSATION EDUCATION provided according to policy [id number]_323: (delgado with an X)  ____Yes    ____ No     ____ NA    Smoking Cessation Booklet given:  ____Yes  ____No ____[facility]

## 2018-08-13 NOTE — PROGRESS NOTES
control  · On Eliquis  · Hypertension   · Controlled  · Chronic tobacco abuse  · Counseled on smoking cessation  · Nutrition status: moderate-severe protein calorie malnutrition  · DVT prophylaxis: Eliquis  · High risk medications: none  · Total critical care time caring for this patient with life threatening, unstable organ failure, including direct patient contact, management of life support systems, review of data including imaging and labs, discussions with other team members and physicians at least 60 minutes so far today, excluding separately billable procedures.     Alton Luke M.D.  8/13/2018  7:18 AM

## 2018-08-13 NOTE — PROGRESS NOTES
PT FOLLOW UP: Yes  Activity Tolerance  Activity Tolerance: Patient Tolerated treatment well;Patient limited by endurance     G-Code     OutComes Score    AM-PAC Score    Goals  Short term goals  Time Frame for Short term goals: 14 visits   Short term goal 1: Patient will demonstrate the ability to ambulate 50ftx1 with LRAD with SBAx1 in order to ease ADLS   Short term goal 2: Patient will demonstrate the ability to complete bed mobility and transfers with SBAx1 in order to ease functional mobility   Short term goal 3: Patient will demonstrate the ability to ascend/descend 5 steps with use of HR and supervision assistance in order to safely get in/out of the home   Short term goal 4: Patient will tolerate 35-45' ther-ex in order to increase endurance and ease ADLs     Plan    Plan  Times per week: 7x/week   Times per day: Twice a day (1x/day on weekends)  Current Treatment Recommendations: Strengthening, Balance Training, Functional Mobility Training, Transfer Training, Endurance Training, Gait Training, Stair training, Neuromuscular Re-education, Pain Management, Home Exercise Program, Safety Education & Training, Patient/Caregiver Education & Training, Equipment Evaluation, Education, & procurement  Safety Devices  Type of devices:  All fall risk precautions in place, Call light within reach, Left in chair, Nurse notified, Gait belt (No alarm upon entry and none needed per Christine Sousa RN.)     Therapy Time   Individual Concurrent Group Co-treatment   Time In 0700         Time Out 0729         Minutes [address], [place]

## 2018-08-13 NOTE — CONSULTS
evidence of gross cranial nerve deficit. Fine tremors noted in both upper and lower extremities. Skin: Skin is warm and dry. There is no rash or diaphoresis. Psychiatric: He has a normal mood and affect. His speech is normal and behavior is normal.      MOST RECENT LABS ON RECORD:   Lab Results   Component Value Date    WBC 10.0 08/12/2018    HGB 11.4 (L) 08/12/2018    HCT 35.0 (L) 08/12/2018     08/12/2018    CHOL 218 (H) 12/26/2017    TRIG 75 12/26/2017    HDL 75 12/26/2017    ALT 36 06/10/2018    AST 33 06/10/2018     08/13/2018    K 4.3 08/13/2018     08/13/2018    CREATININE 0.56 (L) 08/13/2018    BUN 16 08/13/2018    CO2 32 (H) 08/13/2018    PSA 1.69 06/23/2017    INR 1.0 03/06/2017    LABA1C 5.4 01/09/2018    LABMICR CANNOT BE CALCULATED 12/26/2017       ASSESSMENT:  Patient Active Problem List    Diagnosis Date Noted    Paroxysmal atrial fibrillation ([facility] 08/11/2018     Priority: High     Class: Acute    Tobacco abuse 08/10/2018    Acute on chronic respiratory failure ([facility] 08/09/2018    Severe malnutrition ([facility] 06/11/2018    Chronic respiratory failure with hypoxia ([facility] 06/11/2018    [facility] 06/09/2018    Dyslipidemia 04/11/2017    COPD, severe ([facility] 03/28/2017    Essential hypertension 03/28/2017       PLAN:  · Paroxysmal Atrial Fibrillation: Rhythm control  · Beta Blocker: Not good candidate for beta blocker therapy giving severe COPD and frequent COPD exacerbations. · Calcium Channel Blocker: Increase diltiazem 60 mg three times/day. I discussed the potential side effects of this medication including lightheadedness and dizziness and told him to call the office if this occurs. · Discontinue digoxin. · Stroke Risk: His CHADS2-VASc score is 1/9 (1.3% stroke risk)  · Anticoagulation: Continue Apixaban (Eliquis) 5 mg every 12 hours.  I also reminded him to watch for signs of blood in his stool or black tarry stools and stop the medication immediately if this develops as this could be life threatening. · Additional Testing: I Ordered an Echocardiogram to assess Mr. Lopez's ejection fraction and to look for significant valvular heart disease as a source of Mr. Jona Diaz symptoms    · Chest Pain, Likely Non-cardiac: Musculoskeletal chest pain secondary to severe cough, however patient has several risk factors for CAD and outpatient stress testing is indicated. We'll do this on follow-up. Essential Hypertension: Uncontrolled  ACE Inibitor/ARB: Continue losartan (Cozaar) 100 mgonce daily   Diuretics: Not indicated      Calcium Channel Blocker: Continue diltiazem 60 mg three times/day. As above. Can be changed to long-acting diltiazem on discharge. Additional Testing: Follow-up echo as above. Once again, thank you for allowing me to participate in this patients care. Please do not hesitate to contact me could I be of further assistance. Sincerely,  Yelitza Miller MD, MS, F.A.C.C. [facility]) [facility], [address] [place] [address], [address]  Phone: [phone], Fax: [phone]    Based on the patients current medical conditions, I believe the risk of significant morbidity and mortality is: intermediate to high.

## 2018-08-13 NOTE — PLAN OF CARE
Facsimile Transmission Cover Sheet    Information contained in this transmission is for the sole use of the intended recipients and may contain confidential and privileged information. Any unauthorized review, use, disclosure or distribution is prohibited. If you are not the intended recipient, please contact the sender and destroy all copies of the original message. Disclosure is made for the purpose of healthcare operations and continuity of care. To: __Dr Gaming________________________    From: ICU    Fax Number: [phone]    Sender:_Jenny Ag RN_________________ Phone Number:_[phone]____________      This request is: Urgent - No    Information and/or questions regarding patient condition:    Dede Cooper is requesting medication for heart burn. Said he takes Tums at home. Can we have an order?   Thank Juana Wolf

## 2018-08-13 NOTE — PROGRESS NOTES
Ambulated to bathroom for bowel movement. Monitor shows sinus tachycardia with rate 120's-130's. Dyspnea with ambulation back to chair. Complains of lightheadedness. Purse lip breathing. SAO2 90's. Dyspnea and lightheadedness subsiding within 5 minutes. Monitor returns to SR at 117.

## 2018-08-13 NOTE — PROGRESS NOTES
Nutrition Assessment    Type and Reason for Visit: Reassess    Nutrition Recommendations:  Continue diet and supplement    Nutrition Assessment:  · Subjective Assessment: Therapy in with patient. · Nutrition-Focused Physical Findings: underweight  · Wound Type: None  · Current Nutrition Therapies:  · Oral Diet Orders: General   · Oral Diet intake: %  · Oral Nutrition Supplement (ONS) Orders: Standard High Calorie Oral Supplement (adding two edison today)  · ONS intake: %  · Anthropometric Measures:  · Ht: 5' 6\" (167.6 cm)   · Current Body Wt: 101 lb 8 oz (46 kg)  · Admission Body Wt: 95 lb (43.1 kg)  · Usual Body Wt: 112 lb (50.8 kg) (one year ago)  · % Weight Change: 6.8% increase, 9.4% residual loss,  acute and one year respectively  · Ideal Body Wt: 142 lb (64.4 kg), % Ideal Body 69%  · BMI Classification: BMI <18.5 Underweight  · Comparative Standards (Estimated Nutrition Needs):  · Estimated Daily Total Kcal: 2935-7395 (likely higher needs)  · Estimated Daily Protein (g): 67-89    Lab Results   Component Value Date     08/13/2018    K 4.3 08/13/2018     08/13/2018    CO2 32 (H) 08/13/2018    BUN 16 08/13/2018    CREATININE 0.56 (L) 08/13/2018    GLUCOSE 109 (H) 08/13/2018    CALCIUM 8.7 08/13/2018    PROT 6.1 (L) 06/10/2018    LABALBU 3.5 06/10/2018    BILITOT 0.86 06/10/2018    ALKPHOS 62 06/10/2018    AST 33 06/10/2018    ALT 36 06/10/2018    LABGLOM >60 08/13/2018    GFRAA >60 08/13/2018     Lab Results   Component Value Date    LABA1C 5.4 01/09/2018     No results found for: EAG     Estimated Intake vs Estimated Needs: Intake Meets Needs    Nutrition Risk Level: Moderate, High    Improving oral intakes and weight status with PO meals and supplements. Noted rehab plan at discharge. Would recommend to continue with med pass or similar at rehab center to aid weight stability and repletion.      Nutrition Interventions:   Continue current ONS, Continue current diet  Continued Inpatient

## 2018-08-14 LAB
ABSOLUTE EOS #: 0 K/UL (ref 0–0.44)
ABSOLUTE IMMATURE GRANULOCYTE: 0 K/UL (ref 0–0.3)
ABSOLUTE LYMPH #: 0.6 K/UL (ref 1.1–3.7)
ABSOLUTE MONO #: 0.09 K/UL (ref 0.1–1.2)
ANION GAP SERPL CALCULATED.3IONS-SCNC: 7 MMOL/L (ref 9–17)
BASOPHILS # BLD: 0 % (ref 0–2)
BASOPHILS ABSOLUTE: 0 K/UL (ref 0–0.2)
BUN BLDV-MCNC: 18 MG/DL (ref 6–20)
BUN/CREAT BLD: 34 (ref 9–20)
CALCIUM SERPL-MCNC: 8.7 MG/DL (ref 8.6–10.4)
CHLORIDE BLD-SCNC: 100 MMOL/L (ref 98–107)
CO2: 33 MMOL/L (ref 20–31)
CREAT SERPL-MCNC: 0.53 MG/DL (ref 0.7–1.2)
DIFFERENTIAL TYPE: ABNORMAL
EOSINOPHILS RELATIVE PERCENT: 0 % (ref 1–4)
GFR AFRICAN AMERICAN: >60 ML/MIN
GFR NON-AFRICAN AMERICAN: >60 ML/MIN
GFR SERPL CREATININE-BSD FRML MDRD: ABNORMAL ML/MIN/{1.73_M2}
GFR SERPL CREATININE-BSD FRML MDRD: ABNORMAL ML/MIN/{1.73_M2}
GLUCOSE BLD-MCNC: 111 MG/DL (ref 70–99)
HCT VFR BLD CALC: 34.4 % (ref 40.7–50.3)
HEMOGLOBIN: 11.6 G/DL (ref 13–17)
IMMATURE GRANULOCYTES: 0 %
LYMPHOCYTES # BLD: 7 % (ref 24–43)
MCH RBC QN AUTO: 33.6 PG (ref 25.2–33.5)
MCHC RBC AUTO-ENTMCNC: 33.7 G/DL (ref 28.4–34.8)
MCV RBC AUTO: 99.7 FL (ref 82.6–102.9)
MONOCYTES # BLD: 1 % (ref 3–12)
MORPHOLOGY: NORMAL
NRBC AUTOMATED: 0 PER 100 WBC
PDW BLD-RTO: 12.7 % (ref 11.8–14.4)
PLATELET # BLD: 154 K/UL (ref 138–453)
PLATELET ESTIMATE: ABNORMAL
PMV BLD AUTO: 9.4 FL (ref 8.1–13.5)
POTASSIUM SERPL-SCNC: 4.3 MMOL/L (ref 3.7–5.3)
RBC # BLD: 3.45 M/UL (ref 4.21–5.77)
RBC # BLD: ABNORMAL 10*6/UL
SEG NEUTROPHILS: 92 % (ref 36–65)
SEGMENTED NEUTROPHILS ABSOLUTE COUNT: 7.81 K/UL (ref 1.5–8.1)
SODIUM BLD-SCNC: 140 MMOL/L (ref 135–144)
WBC # BLD: 8.5 K/UL (ref 3.5–11.3)
WBC # BLD: ABNORMAL 10*3/UL

## 2018-08-14 PROCEDURE — 6360000002 HC RX W HCPCS: Performed by: INTERNAL MEDICINE

## 2018-08-14 PROCEDURE — 36415 COLL VENOUS BLD VENIPUNCTURE: CPT

## 2018-08-14 PROCEDURE — 6360000002 HC RX W HCPCS: Performed by: PHYSICIAN ASSISTANT

## 2018-08-14 PROCEDURE — 97110 THERAPEUTIC EXERCISES: CPT

## 2018-08-14 PROCEDURE — 94640 AIRWAY INHALATION TREATMENT: CPT

## 2018-08-14 PROCEDURE — 94660 CPAP INITIATION&MGMT: CPT

## 2018-08-14 PROCEDURE — 97116 GAIT TRAINING THERAPY: CPT

## 2018-08-14 PROCEDURE — 6370000000 HC RX 637 (ALT 250 FOR IP): Performed by: INTERNAL MEDICINE

## 2018-08-14 PROCEDURE — 2000000000 HC ICU R&B

## 2018-08-14 PROCEDURE — 6370000000 HC RX 637 (ALT 250 FOR IP): Performed by: FAMILY MEDICINE

## 2018-08-14 PROCEDURE — 6370000000 HC RX 637 (ALT 250 FOR IP): Performed by: PHYSICIAN ASSISTANT

## 2018-08-14 PROCEDURE — 94664 DEMO&/EVAL PT USE INHALER: CPT

## 2018-08-14 PROCEDURE — 2580000003 HC RX 258: Performed by: FAMILY MEDICINE

## 2018-08-14 PROCEDURE — 85025 COMPLETE CBC W/AUTO DIFF WBC: CPT

## 2018-08-14 PROCEDURE — 80048 BASIC METABOLIC PNL TOTAL CA: CPT

## 2018-08-14 RX ORDER — ACETYLCYSTEINE 200 MG/ML
600 SOLUTION ORAL; RESPIRATORY (INHALATION) 2 TIMES DAILY
Status: DISCONTINUED | OUTPATIENT
Start: 2018-08-14 | End: 2018-08-15

## 2018-08-14 RX ORDER — CETIRIZINE HYDROCHLORIDE 10 MG/1
10 TABLET ORAL DAILY
Status: DISCONTINUED | OUTPATIENT
Start: 2018-08-14 | End: 2018-08-20 | Stop reason: HOSPADM

## 2018-08-14 RX ORDER — LORAZEPAM 2 MG/ML
0.5 INJECTION INTRAMUSCULAR ONCE
Status: COMPLETED | OUTPATIENT
Start: 2018-08-14 | End: 2018-08-14

## 2018-08-14 RX ORDER — LORAZEPAM 2 MG/ML
0.5 INJECTION INTRAMUSCULAR EVERY 6 HOURS PRN
Status: DISCONTINUED | OUTPATIENT
Start: 2018-08-14 | End: 2018-08-15

## 2018-08-14 RX ADMIN — ALBUTEROL SULFATE 2.5 MG: 2.5 SOLUTION RESPIRATORY (INHALATION) at 22:07

## 2018-08-14 RX ADMIN — ACETYLCYSTEINE 600 MG: 200 INHALANT RESPIRATORY (INHALATION) at 21:00

## 2018-08-14 RX ADMIN — TRAMADOL HYDROCHLORIDE 50 MG: 50 TABLET, FILM COATED ORAL at 20:47

## 2018-08-14 RX ADMIN — FAMOTIDINE 20 MG: 20 TABLET ORAL at 20:47

## 2018-08-14 RX ADMIN — MOMETASONE FUROATE AND FORMOTEROL FUMARATE DIHYDRATE 2 PUFF: 200; 5 AEROSOL RESPIRATORY (INHALATION) at 21:01

## 2018-08-14 RX ADMIN — MONTELUKAST SODIUM 10 MG: 10 TABLET, FILM COATED ORAL at 08:44

## 2018-08-14 RX ADMIN — FAMOTIDINE 20 MG: 20 TABLET ORAL at 08:44

## 2018-08-14 RX ADMIN — DOXYCYCLINE HYCLATE 100 MG: 100 CAPSULE, GELATIN COATED ORAL at 22:45

## 2018-08-14 RX ADMIN — SODIUM CHLORIDE: 9 INJECTION, SOLUTION INTRAVENOUS at 09:13

## 2018-08-14 RX ADMIN — LOSARTAN POTASSIUM 100 MG: 50 TABLET ORAL at 08:44

## 2018-08-14 RX ADMIN — CETIRIZINE HYDROCHLORIDE 10 MG: 10 TABLET, FILM COATED ORAL at 08:44

## 2018-08-14 RX ADMIN — IPRATROPIUM BROMIDE AND ALBUTEROL SULFATE 1 AMPULE: .5; 3 SOLUTION RESPIRATORY (INHALATION) at 06:13

## 2018-08-14 RX ADMIN — METHYLPREDNISOLONE SODIUM SUCCINATE 60 MG: 125 INJECTION, POWDER, FOR SOLUTION INTRAMUSCULAR; INTRAVENOUS at 06:44

## 2018-08-14 RX ADMIN — IPRATROPIUM BROMIDE AND ALBUTEROL SULFATE 1 AMPULE: .5; 3 SOLUTION RESPIRATORY (INHALATION) at 10:16

## 2018-08-14 RX ADMIN — METHYLPREDNISOLONE SODIUM SUCCINATE 60 MG: 125 INJECTION, POWDER, FOR SOLUTION INTRAMUSCULAR; INTRAVENOUS at 00:49

## 2018-08-14 RX ADMIN — TRAMADOL HYDROCHLORIDE 50 MG: 50 TABLET, FILM COATED ORAL at 05:06

## 2018-08-14 RX ADMIN — APIXABAN 5 MG: 5 TABLET, FILM COATED ORAL at 08:44

## 2018-08-14 RX ADMIN — LORAZEPAM 0.5 MG: 2 INJECTION INTRAMUSCULAR; INTRAVENOUS at 21:38

## 2018-08-14 RX ADMIN — METHYLPREDNISOLONE SODIUM SUCCINATE 60 MG: 125 INJECTION, POWDER, FOR SOLUTION INTRAMUSCULAR; INTRAVENOUS at 19:03

## 2018-08-14 RX ADMIN — DILTIAZEM HYDROCHLORIDE 60 MG: 60 TABLET, FILM COATED ORAL at 14:52

## 2018-08-14 RX ADMIN — DILTIAZEM HYDROCHLORIDE 60 MG: 60 TABLET, FILM COATED ORAL at 05:06

## 2018-08-14 RX ADMIN — ALBUTEROL SULFATE 2.5 MG: 2.5 SOLUTION RESPIRATORY (INHALATION) at 01:09

## 2018-08-14 RX ADMIN — APIXABAN 5 MG: 5 TABLET, FILM COATED ORAL at 20:47

## 2018-08-14 RX ADMIN — ACETYLCYSTEINE 600 MG: 200 INHALANT RESPIRATORY (INHALATION) at 10:16

## 2018-08-14 RX ADMIN — DOXYCYCLINE HYCLATE 100 MG: 100 CAPSULE, GELATIN COATED ORAL at 12:05

## 2018-08-14 RX ADMIN — LORAZEPAM 0.5 MG: 2 INJECTION INTRAMUSCULAR; INTRAVENOUS at 12:33

## 2018-08-14 RX ADMIN — MOMETASONE FUROATE AND FORMOTEROL FUMARATE DIHYDRATE 2 PUFF: 200; 5 AEROSOL RESPIRATORY (INHALATION) at 10:21

## 2018-08-14 RX ADMIN — METHYLPREDNISOLONE SODIUM SUCCINATE 60 MG: 125 INJECTION, POWDER, FOR SOLUTION INTRAMUSCULAR; INTRAVENOUS at 12:07

## 2018-08-14 RX ADMIN — Medication 5 ML: at 00:52

## 2018-08-14 RX ADMIN — IPRATROPIUM BROMIDE AND ALBUTEROL SULFATE 1 AMPULE: .5; 3 SOLUTION RESPIRATORY (INHALATION) at 21:00

## 2018-08-14 RX ADMIN — IPRATROPIUM BROMIDE AND ALBUTEROL SULFATE 1 AMPULE: .5; 3 SOLUTION RESPIRATORY (INHALATION) at 15:12

## 2018-08-14 RX ADMIN — LORAZEPAM 0.5 MG: 2 INJECTION INTRAMUSCULAR; INTRAVENOUS at 22:14

## 2018-08-14 RX ADMIN — DILTIAZEM HYDROCHLORIDE 60 MG: 60 TABLET, FILM COATED ORAL at 22:45

## 2018-08-14 ASSESSMENT — PAIN DESCRIPTION - ORIENTATION
ORIENTATION: LEFT

## 2018-08-14 ASSESSMENT — PAIN SCALES - GENERAL
PAINLEVEL_OUTOF10: 6
PAINLEVEL_OUTOF10: 6
PAINLEVEL_OUTOF10: 0
PAINLEVEL_OUTOF10: 4
PAINLEVEL_OUTOF10: 0
PAINLEVEL_OUTOF10: 6
PAINLEVEL_OUTOF10: 0
PAINLEVEL_OUTOF10: 3
PAINLEVEL_OUTOF10: 8
PAINLEVEL_OUTOF10: 4
PAINLEVEL_OUTOF10: 8

## 2018-08-14 ASSESSMENT — PAIN DESCRIPTION - ONSET: ONSET: OTHER (COMMENT)

## 2018-08-14 ASSESSMENT — PAIN DESCRIPTION - LOCATION
LOCATION: RIB CAGE

## 2018-08-14 ASSESSMENT — PAIN DESCRIPTION - PROGRESSION
CLINICAL_PROGRESSION: NOT CHANGED

## 2018-08-14 ASSESSMENT — PAIN DESCRIPTION - PAIN TYPE
TYPE: ACUTE PAIN

## 2018-08-14 ASSESSMENT — PAIN DESCRIPTION - DESCRIPTORS
DESCRIPTORS: THROBBING

## 2018-08-14 ASSESSMENT — PAIN DESCRIPTION - FREQUENCY: FREQUENCY: INTERMITTENT

## 2018-08-14 NOTE — PROGRESS NOTES
RESPIRATORY ASSESSMENT PROTOCOL                                                                                              Patient Name: Edison Console Room#: R444/[id number] : 1965     Admitting diagnosis: Acute on chronic respiratory failure (HCC) [J96.20]  Acute on chronic respiratory failure ([facility] [J96.20]       Medical History:   Past Medical History:   Diagnosis Date    Arthritis     COPD (chronic obstructive pulmonary disease) ([facility]     Hypertension     Scoliosis        PATIENT ASSESSMENT    LABORATORY DATA  Hematology:   Lab Results   Component Value Date    WBC 10.0 2018    RBC 3.44 2018    HGB 11.4 2018    HCT 35.0 2018     2018     Chemistry:    Lab Results   Component Value Date    PHART 7.431 2018    QNB0ZPF 43.6 2018    PO2ART 70.7 2018    T7JHCNTN 94.6 2018    TOP5SGB 28.4 2018    PBEA 3.5 2018       Blood Culture:   Sputum Culture:     VITALS  Pulse: 90   Resp: 20  BP: 113/74  SpO2: 99 % O2 Device: Bi-PAP  Temp: 97.6 °F (36.4 °C)  Comment:     SKIN COLOR  [x] Normal  [] Pale  [] Dusky  [] Cyanotic      RESPIRATORY PATTERN  [] Normal  [x] Dyspnea  [] Cheyne-Lawrence  [] Kussmaul  [] Biots    AMBULATORY  [] Yes  [] No  [x] With Assistance    PEAK FLOW  Predicted:     Personal Best:        VITAL CAPACITY  Predicted value:  ml  Actual Value:  ml  30% of Predicted:  Ml    Patient Acuity 0 1 2 3 4 Score   Level of Concious (LOC) [x]  Alert & Oriented or Pt normal LOC []  Confused;follows directions []  Confused & uncooper-ative []  Obtunded []  Comatose 0   Respiratory Rate  (RR) []  Reg. rate & pattern. 12 - 20 bpm  []  Increased RR.  Greater than 20 bpm   []  SOB w/ exertion or RR greater than 24 bpm [x]  Access- ory muscle use at rest. Abn.  resp. []  SOB at rest.   3   Bilateral Breath Sounds (BBS) []  Clear []  Diminish-ed bases  [x]  Diminish-ed t/o, or rales   []  Sporadic, scattered wheezes or rhonchi []  Persistentwheezes and, or absent BBS 2   Cough [x]  Strong, effective, & non-prod. []  Effective & prod. Less than 25 ml (2 TBSP) over past 24 hrs []  Ineffective & non-prod to less than 25 ML over past 24 hrs []  Ineffective and, or greater than 25 ml sputum prod. past 24 hrs. []  Nonspon- taneous; Requires suctioning 0   Pulmonary History  (PULM HX) []  No smoking and no chronic pulmonaryhistory []  Former smoker. Quit over 12 mos. ago []  Current smoker or quit w/ in 12 mos []  Pulm. History and, or 20 pk/yr smoking hx [x]  Admitted w/ acute pulm. dx and, or has been admitted w/ pulm. dx 2 or more times over past 12 mos 4   Surgical History this Admit  (SURG HX) [x]  No surgery []  General surgery []  Lower abdominal []  Thoracic or upper abdominal   []  Thoracic w/ pulm. disease 0   Chest X-Ray (CXR)/CT Scan [x]  Clear or not applicable []  Not available []  Atelect- asis or pleural effusions []  Localized infiltrate or pulm. edema []  Con-solidated Infiltrates, bilateral, or in more than 1 lobe 0   Slow or Forced VC, FEV1 OR PEFR (PULM FXN)  [x]  80% or greater, or not indicated []  Pt. unable to perform []  FEV1 or PEFR or VC 51-79%. []  FEV1 or PEFR or VC  30-49%   []  FEV1 or PEFR or VC less than 30%   0   TOTAL ACUITY: 9       CARE PLAN    If Acuity Level is 2, 3, or 4 in any of the following:    [x] BILATERAL BREATH SOUNDS (BBS)     [x] PULMONARY HISTORY (PULM HX)  [] PULMONARY FUNCTION (PULM FX)    Goal: Improve respiratory functions in patients with airway disease and decrease WOB    [x] AEROSOL PROTOCOL    Total Acuity:   16-32  []  Secondary Assessment in 24 hrs Total Acuity:  9-15  [x]  Secondary Assessment in 24 hrs Total Acuity:  4-8  []  Secondary Assessment in 48 hrs Total Acuity:  0-3  []  Secondary Assessment in 72 hrs   HHN AEROSOL THERAPY with  [physician-ordered bronchodilator(s)] q 4 & Albuterol PRN q2 hrs. Breath-Actuated Neb if BBS Acuity = 4, and pt. can use MP.  Notify physician if condition deteriorates. HHN AEROSOL THERAPY with  [physician-ordered bronchodilator(s)]  QID and Albuterol PRN q4 hrs. Breath-Actuated Neb if BBS Acuity = 4, and pt. can use MP. Notify physician if condition deteriorates. MDI THERAPY with  2 actuations of [physician-ordered bronchodilator(s)] via spacer TID Albuterol and PRNq4 hrs. If unable to utilize MDI: HHN [physician-ordered bronchodilator(s)] TID and Albuterol PRN q4 hrs. Notify physician if condition deteriorates. MDI THERAPY with  [physician-ordered bronchodilator(s)] via spacer TID PRN. If unable to utilize MDI: HHN [physician-ordered bronchodilator(s)] TID PRN. Notify physician if condition deteriorates. If Acuity Level is 2, 3, or 4 in any of the following:    [] COUGH     [] SURGICAL HISTORY (SURG HX)  [] CHEST XRAY (CXR)    Goal: Improvement in sputum mobilization in patients with ineffective airway clearance. Reverse atelectasis. [] Bronchopulmonary Hygiene Protocol    Total Acuity:   16-32  []  Secondary Assessment in 24 hrs Total Acuity:  9-15  []  Secondary Assessment in 24 hrs Total Acuity:  4-8  []  Secondary Assessment in 48 hrs Total Acuity:  0-3  []  Secondary Assessment in 72 hrs   METANEB QID with [physician-ordered bronchodilator(s)] if CXR Acuity = 4; otherwise:  PD&P, PEP, or Vest QID & PRN  NT Sxn PRN for ineffective cough  METANEB QID with [physician-ordered bronchodilator(s)] if CXR Acuity = 4; otherwise:  PD&P, PEP, or Vest TID & PRN  NT Sxn PRN for ineffective cough  Instruct patient to self-perform IS q1hr WA   Directed Cough self-performed q1hr WA     If Acuity Level is 2 or above in the following:    [] PULMONARY HISTORY (PULM HX)    Goal: Assist patient in quitting smoking to slow or stop the progression of lung disease.     [] Smoking Cessation Protocol    SMOKING CESSATION EDUCATION provided according to policy [id number]_796: (delgado with an X)  ____Yes    ____ No     ____ NA    Smoking Cessation Booklet given:  ____Yes  ____No ____Patient Noah Dowd

## 2018-08-14 NOTE — PROGRESS NOTES
Therapy assisted patient to stand by bedside while writer re-zeroed the bed to get an accurate and recent weight. Patient returned to bed and weight was taken, 109.8lb.

## 2018-08-14 NOTE — PROGRESS NOTES
Pt called out and stated he was SOB. Writer called respiratory for a breathing treatment. Respiratory stated they would be a little bit. Writer started breathing tx of albuterol.

## 2018-08-14 NOTE — PROGRESS NOTES
lymphadenopathy. No carotid bruit  CVS:    regular but tachy. no audible murmur but difficult to hear with BiPAP in place  PULM:  still quite diminished. no rhonchi or wheezing  ABD:    Bowels sounds normal.  Abdomen is soft. No distention. No tenderness. EXT:   no edema bilaterally . No calf tenderness. NEURO: Motor and sensory are intact  SKIN:  No rashes. No skin lesions. LINES: antecubital IV in place, no signs of infection  -----------------------------------------------------------------  Diagnostic Data:    · All lines, drips, IV sites and medications reviewed  · All available data reviewed  Lab Results   Component Value Date    WBC 8.5 08/14/2018    HGB 11.6 (L) 08/14/2018    MCV 99.7 08/14/2018     08/14/2018     Lab Results   Component Value Date    SEGS 92 (H) 08/14/2018    LYMPHOPCT 7 (L) 08/14/2018    MONOPCT 1 (L) 08/14/2018    EOSRELPCT 0 (L) 08/14/2018    BASOPCT 0 08/14/2018    NEUTROABS 7.81 08/14/2018    Lab Results   Component Value Date    GLUCOSE 111 (H) 08/14/2018    BUN 18 08/14/2018    CREATININE 0.53 (L) 08/14/2018     08/14/2018    K 4.3 08/14/2018    CALCIUM 8.7 08/14/2018     08/14/2018    CO2 33 (H) 08/14/2018     Lab Results   Component Value Date    LACTA 1.0 08/09/2018       Results for Ko Malagon (MRN [id number]) as of 8/14/2018 07:21   Ref.  Range 8/13/2018 08:50   pH, Arterial Latest Ref Range: 7.35 - 7.45  7.431   pCO2, Arterial Latest Ref Range: 35 - 45 mmHg 43.6   pO2, Arterial Latest Ref Range: 80 - 100 mmHg 70.7 (L)   HCO3, Arterial Latest Ref Range: 22 - 26 mmol/L 28.4 (H)   Positive Base Excess, Art Latest Ref Range: 0.0 - 2.0 mmol/L 3.5 (H)   O2 Sat, Arterial Latest Ref Range: 95 - 98 % 94.6 (L)       PROBLEM LIST:  Principal Problem:    Acute on chronic respiratory failure (HCC)  Active Problems:    COPD exacerbation (HCC)    COPD, severe (HCC)    Uncontrolled hypertension    Dyslipidemia    Severe malnutrition (HCC)    Chronic respiratory

## 2018-08-14 NOTE — PLAN OF CARE
Problem: Pain:  Goal: Pain level will decrease  Pain level will decrease   Outcome: Ongoing  Pt able to express pain with 0-10 pain scale, will continue to monitor    Problem: Falls - Risk of:  Goal: Will remain free from falls  Will remain free from falls   Outcome: Ongoing  Pt up with assistance, call light in reach, instructed pt to use call light for assistance. Goal: Absence of physical injury  Absence of physical injury   Outcome: Completed Date Met: 08/14/18      Problem: Activity Intolerance:  Goal: Ability to tolerate increased activity will improve  Ability to tolerate increased activity will improve   Outcome: Ongoing  Low activity level, HERRERA, will continue to monitor    Problem: Breathing Pattern - Ineffective:  Goal: Ability to achieve and maintain a regular respiratory rate will improve  Ability to achieve and maintain a regular respiratory rate will improve   Outcome: Ongoing  Will continue to monitor    Problem: Gas Exchange - Impaired:  Goal: Levels of oxygenation will improve  Levels of oxygenation will improve   Outcome: Ongoing  Tolerating Bipap, will continue to monitor.

## 2018-08-14 NOTE — PROGRESS NOTES
Bi-Pap removed at this time so patient can eat lunch. 3 lpm nasal cannula applied; sating 94% at this time. Patient remain very anxious, have call out to Dr. Joey Dejesus for an antianxiety medication. Will continue to monitor.

## 2018-08-14 NOTE — PROGRESS NOTES
Patient seems and states that he is more relaxed at this time after Ativan administration. Respirations are more regular and unlabored at 18 per minute. 3 lpm nasal cannula remain in place, pulse ox 95%. Patient requesting room door be closed to decrease stimulation so he can nap. Will continue to monitor.

## 2018-08-14 NOTE — PROGRESS NOTES
Training, Functional Mobility Training, Endurance Training, Safety Education & Training, Self-Care / ADL, Patient/Caregiver Education & Training, Equipment Evaluation, Education, & procurement  G-Code     OutComes Score                                           AM-PAC Score             Goals  Short term goals  Time Frame for Short term goals: 3-5 days  Short term goal 1: Patient to state 5 EC/WS techniques to implement throughout I/ADL tasks. Short term goal 2: Patient to complete ADL routine while implementing EC/WS techniques and/or AE/DME to improve ADL independence and tolerance. Short term goal 3: Patient to engage in 15 min of ther ex/ther act with rest breaks PRN to improve UE/UB strength and activity tolerance.        Therapy Time   Individual Concurrent Group Co-treatment   Time In 1455         Time Out 1510         Minutes 15                 CHERRIE Villegas

## 2018-08-15 ENCOUNTER — APPOINTMENT (OUTPATIENT)
Dept: CT IMAGING | Age: 53
DRG: 189 | End: 2018-08-15
Payer: MEDICARE

## 2018-08-15 PROBLEM — F10.10 ETOH ABUSE: Status: ACTIVE | Noted: 2018-08-15

## 2018-08-15 PROBLEM — F41.9 SEVERE ANXIETY: Status: ACTIVE | Noted: 2018-08-15

## 2018-08-15 LAB
ANION GAP SERPL CALCULATED.3IONS-SCNC: 8 MMOL/L (ref 9–17)
BUN BLDV-MCNC: 26 MG/DL (ref 6–20)
BUN/CREAT BLD: 41 (ref 9–20)
CALCIUM SERPL-MCNC: 8.6 MG/DL (ref 8.6–10.4)
CHLORIDE BLD-SCNC: 101 MMOL/L (ref 98–107)
CO2: 33 MMOL/L (ref 20–31)
CREAT SERPL-MCNC: 0.64 MG/DL (ref 0.7–1.2)
GFR AFRICAN AMERICAN: >60 ML/MIN
GFR NON-AFRICAN AMERICAN: >60 ML/MIN
GFR SERPL CREATININE-BSD FRML MDRD: ABNORMAL ML/MIN/{1.73_M2}
GFR SERPL CREATININE-BSD FRML MDRD: ABNORMAL ML/MIN/{1.73_M2}
GLUCOSE BLD-MCNC: 110 MG/DL (ref 70–99)
HCT VFR BLD CALC: 34.7 % (ref 40.7–50.3)
HEMOGLOBIN: 11.4 G/DL (ref 13–17)
MCH RBC QN AUTO: 33 PG (ref 25.2–33.5)
MCHC RBC AUTO-ENTMCNC: 32.9 G/DL (ref 28.4–34.8)
MCV RBC AUTO: 100.6 FL (ref 82.6–102.9)
NRBC AUTOMATED: 0 PER 100 WBC
PDW BLD-RTO: 13 % (ref 11.8–14.4)
PLATELET # BLD: 170 K/UL (ref 138–453)
PMV BLD AUTO: 10 FL (ref 8.1–13.5)
POTASSIUM SERPL-SCNC: 4.3 MMOL/L (ref 3.7–5.3)
RBC # BLD: 3.45 M/UL (ref 4.21–5.77)
SODIUM BLD-SCNC: 142 MMOL/L (ref 135–144)
WBC # BLD: 9.2 K/UL (ref 3.5–11.3)

## 2018-08-15 PROCEDURE — 94664 DEMO&/EVAL PT USE INHALER: CPT

## 2018-08-15 PROCEDURE — 6370000000 HC RX 637 (ALT 250 FOR IP): Performed by: INTERNAL MEDICINE

## 2018-08-15 PROCEDURE — 2000000000 HC ICU R&B

## 2018-08-15 PROCEDURE — 97110 THERAPEUTIC EXERCISES: CPT

## 2018-08-15 PROCEDURE — 6360000002 HC RX W HCPCS: Performed by: INTERNAL MEDICINE

## 2018-08-15 PROCEDURE — 94660 CPAP INITIATION&MGMT: CPT

## 2018-08-15 PROCEDURE — 6370000000 HC RX 637 (ALT 250 FOR IP): Performed by: FAMILY MEDICINE

## 2018-08-15 PROCEDURE — 80048 BASIC METABOLIC PNL TOTAL CA: CPT

## 2018-08-15 PROCEDURE — 94640 AIRWAY INHALATION TREATMENT: CPT

## 2018-08-15 PROCEDURE — 36415 COLL VENOUS BLD VENIPUNCTURE: CPT

## 2018-08-15 PROCEDURE — 6360000002 HC RX W HCPCS: Performed by: PHYSICIAN ASSISTANT

## 2018-08-15 PROCEDURE — 85027 COMPLETE CBC AUTOMATED: CPT

## 2018-08-15 PROCEDURE — 6370000000 HC RX 637 (ALT 250 FOR IP): Performed by: PHYSICIAN ASSISTANT

## 2018-08-15 PROCEDURE — 71260 CT THORAX DX C+: CPT

## 2018-08-15 PROCEDURE — 2580000003 HC RX 258: Performed by: INTERNAL MEDICINE

## 2018-08-15 PROCEDURE — 6360000004 HC RX CONTRAST MEDICATION: Performed by: PHYSICIAN ASSISTANT

## 2018-08-15 RX ORDER — LORAZEPAM 2 MG/ML
0.5 INJECTION INTRAMUSCULAR EVERY 4 HOURS PRN
Status: DISCONTINUED | OUTPATIENT
Start: 2018-08-15 | End: 2018-08-17

## 2018-08-15 RX ORDER — FOLIC ACID 1 MG/1
1 TABLET ORAL DAILY
Status: DISCONTINUED | OUTPATIENT
Start: 2018-08-15 | End: 2018-08-20 | Stop reason: HOSPADM

## 2018-08-15 RX ORDER — FUROSEMIDE 10 MG/ML
40 INJECTION INTRAMUSCULAR; INTRAVENOUS ONCE
Status: COMPLETED | OUTPATIENT
Start: 2018-08-15 | End: 2018-08-15

## 2018-08-15 RX ORDER — CITALOPRAM 10 MG/1
10 TABLET ORAL DAILY
Status: DISCONTINUED | OUTPATIENT
Start: 2018-08-15 | End: 2018-08-20 | Stop reason: HOSPADM

## 2018-08-15 RX ORDER — THIAMINE HCL 50 MG
100 TABLET ORAL DAILY
Status: DISCONTINUED | OUTPATIENT
Start: 2018-08-15 | End: 2018-08-20 | Stop reason: HOSPADM

## 2018-08-15 RX ORDER — LOSARTAN POTASSIUM 50 MG/1
50 TABLET ORAL DAILY
Status: DISCONTINUED | OUTPATIENT
Start: 2018-08-16 | End: 2018-08-20 | Stop reason: HOSPADM

## 2018-08-15 RX ORDER — MULTIVITAMIN WITH FOLIC ACID 400 MCG
1 TABLET ORAL DAILY
Status: DISCONTINUED | OUTPATIENT
Start: 2018-08-15 | End: 2018-08-20 | Stop reason: HOSPADM

## 2018-08-15 RX ADMIN — TRAMADOL HYDROCHLORIDE 50 MG: 50 TABLET, FILM COATED ORAL at 20:10

## 2018-08-15 RX ADMIN — LORAZEPAM 0.05 MG: 2 INJECTION INTRAMUSCULAR; INTRAVENOUS at 18:04

## 2018-08-15 RX ADMIN — Medication 10 ML: at 12:43

## 2018-08-15 RX ADMIN — LORAZEPAM 0.5 MG: 2 INJECTION INTRAMUSCULAR; INTRAVENOUS at 12:43

## 2018-08-15 RX ADMIN — IPRATROPIUM BROMIDE AND ALBUTEROL SULFATE 1 AMPULE: .5; 3 SOLUTION RESPIRATORY (INHALATION) at 20:14

## 2018-08-15 RX ADMIN — FOLIC ACID 1 MG: 1 TABLET ORAL at 08:18

## 2018-08-15 RX ADMIN — LORAZEPAM 0.5 MG: 2 INJECTION INTRAMUSCULAR; INTRAVENOUS at 04:37

## 2018-08-15 RX ADMIN — MOMETASONE FUROATE AND FORMOTEROL FUMARATE DIHYDRATE 2 PUFF: 200; 5 AEROSOL RESPIRATORY (INHALATION) at 08:32

## 2018-08-15 RX ADMIN — FAMOTIDINE 20 MG: 20 TABLET ORAL at 20:09

## 2018-08-15 RX ADMIN — DOXYCYCLINE HYCLATE 100 MG: 100 CAPSULE, GELATIN COATED ORAL at 22:24

## 2018-08-15 RX ADMIN — METHYLPREDNISOLONE SODIUM SUCCINATE 60 MG: 125 INJECTION, POWDER, FOR SOLUTION INTRAMUSCULAR; INTRAVENOUS at 07:16

## 2018-08-15 RX ADMIN — MONTELUKAST SODIUM 10 MG: 10 TABLET, FILM COATED ORAL at 08:18

## 2018-08-15 RX ADMIN — IPRATROPIUM BROMIDE AND ALBUTEROL SULFATE 1 AMPULE: .5; 3 SOLUTION RESPIRATORY (INHALATION) at 16:24

## 2018-08-15 RX ADMIN — THIAMINE HCL (VITAMIN B1) 50 MG TABLET 100 MG: at 08:17

## 2018-08-15 RX ADMIN — GUAIFENESIN AND DEXTROMETHORPHAN HYDROBROMIDE 1 TABLET: 600; 30 TABLET, EXTENDED RELEASE ORAL at 20:08

## 2018-08-15 RX ADMIN — Medication 10 ML: at 10:16

## 2018-08-15 RX ADMIN — METHYLPREDNISOLONE SODIUM SUCCINATE 60 MG: 125 INJECTION, POWDER, FOR SOLUTION INTRAMUSCULAR; INTRAVENOUS at 19:13

## 2018-08-15 RX ADMIN — FAMOTIDINE 20 MG: 20 TABLET ORAL at 08:18

## 2018-08-15 RX ADMIN — Medication 10 ML: at 19:13

## 2018-08-15 RX ADMIN — DILTIAZEM HYDROCHLORIDE 90 MG: 60 TABLET, FILM COATED ORAL at 13:43

## 2018-08-15 RX ADMIN — THERA TABS 1 TABLET: TAB at 08:18

## 2018-08-15 RX ADMIN — FUROSEMIDE 40 MG: 10 INJECTION, SOLUTION INTRAMUSCULAR; INTRAVENOUS at 10:14

## 2018-08-15 RX ADMIN — METHYLPREDNISOLONE SODIUM SUCCINATE 60 MG: 125 INJECTION, POWDER, FOR SOLUTION INTRAMUSCULAR; INTRAVENOUS at 00:55

## 2018-08-15 RX ADMIN — IPRATROPIUM BROMIDE AND ALBUTEROL SULFATE 1 AMPULE: .5; 3 SOLUTION RESPIRATORY (INHALATION) at 08:32

## 2018-08-15 RX ADMIN — CITALOPRAM 10 MG: 10 TABLET, FILM COATED ORAL at 10:14

## 2018-08-15 RX ADMIN — IPRATROPIUM BROMIDE AND ALBUTEROL SULFATE 1 AMPULE: .5; 3 SOLUTION RESPIRATORY (INHALATION) at 12:56

## 2018-08-15 RX ADMIN — APIXABAN 5 MG: 5 TABLET, FILM COATED ORAL at 08:18

## 2018-08-15 RX ADMIN — METHYLPREDNISOLONE SODIUM SUCCINATE 60 MG: 125 INJECTION, POWDER, FOR SOLUTION INTRAMUSCULAR; INTRAVENOUS at 12:40

## 2018-08-15 RX ADMIN — DILTIAZEM HYDROCHLORIDE 60 MG: 60 TABLET, FILM COATED ORAL at 05:34

## 2018-08-15 RX ADMIN — MOMETASONE FUROATE AND FORMOTEROL FUMARATE DIHYDRATE 2 PUFF: 200; 5 AEROSOL RESPIRATORY (INHALATION) at 20:14

## 2018-08-15 RX ADMIN — Medication 10 ML: at 18:05

## 2018-08-15 RX ADMIN — GUAIFENESIN AND DEXTROMETHORPHAN HYDROBROMIDE 1 TABLET: 600; 30 TABLET, EXTENDED RELEASE ORAL at 08:18

## 2018-08-15 RX ADMIN — APIXABAN 5 MG: 5 TABLET, FILM COATED ORAL at 20:08

## 2018-08-15 RX ADMIN — LOSARTAN POTASSIUM 100 MG: 50 TABLET ORAL at 08:18

## 2018-08-15 RX ADMIN — DILTIAZEM HYDROCHLORIDE 90 MG: 60 TABLET, FILM COATED ORAL at 20:08

## 2018-08-15 RX ADMIN — CETIRIZINE HYDROCHLORIDE 10 MG: 10 TABLET, FILM COATED ORAL at 08:19

## 2018-08-15 RX ADMIN — IOPAMIDOL 100 ML: 612 INJECTION, SOLUTION INTRAVENOUS at 10:06

## 2018-08-15 RX ADMIN — DOXYCYCLINE HYCLATE 100 MG: 100 CAPSULE, GELATIN COATED ORAL at 10:14

## 2018-08-15 RX ADMIN — Medication 10 ML: at 09:14

## 2018-08-15 ASSESSMENT — PAIN DESCRIPTION - FREQUENCY: FREQUENCY: CONTINUOUS

## 2018-08-15 ASSESSMENT — PAIN DESCRIPTION - ORIENTATION
ORIENTATION: LEFT;POSTERIOR
ORIENTATION: LEFT

## 2018-08-15 ASSESSMENT — PAIN DESCRIPTION - LOCATION
LOCATION: RIB CAGE
LOCATION: RIB CAGE

## 2018-08-15 ASSESSMENT — PAIN SCALES - GENERAL
PAINLEVEL_OUTOF10: 8
PAINLEVEL_OUTOF10: 0
PAINLEVEL_OUTOF10: 4
PAINLEVEL_OUTOF10: 0

## 2018-08-15 ASSESSMENT — PAIN DESCRIPTION - DESCRIPTORS: DESCRIPTORS: CONSTANT

## 2018-08-15 ASSESSMENT — PAIN DESCRIPTION - PAIN TYPE: TYPE: ACUTE PAIN

## 2018-08-15 NOTE — PROGRESS NOTES
Spoke with patient about discharge plan. The discharge plan is still to go to [facility].   ESTER Stone

## 2018-08-15 NOTE — PROGRESS NOTES
for ADL tasks. Pt required prolonged RBs to complete secondary to increased fatigue and SOB. While on 2L of O2, pt tested at 121 BPM and 92-93% O2. Exercises  Other: Pt required occ verbal prompting for pursed lip breathing techs this date. Assessment      Activity Tolerance  Activity Tolerance: Patient limited by fatigue  Safety Devices  Safety Devices in place: Yes  Type of devices: Call light within reach; Left in chair  Restraints  Initially in place: Yes          Plan   Plan  Times per day: Daily  Current Treatment Recommendations: Strengthening, Balance Training, Functional Mobility Training, Endurance Training, Safety Education & Training, Self-Care / ADL, Patient/Caregiver Education & Training, Equipment Evaluation, Education, & procurement  G-Code     OutComes Score                                           AM-PAC Score             Goals  Short term goals  Time Frame for Short term goals: 3-5 days  Short term goal 1: Patient to state 5 EC/WS techniques to implement throughout I/ADL tasks. Short term goal 2: Patient to complete ADL routine while implementing EC/WS techniques and/or AE/DME to improve ADL independence and tolerance. Short term goal 3: Patient to engage in 15 min of ther ex/ther act with rest breaks PRN to improve UE/UB strength and activity tolerance.        Therapy Time   Individual Concurrent Group Co-treatment   Time In 1106         Time Out 1130         Minutes [facility]

## 2018-08-15 NOTE — PROGRESS NOTES
SAO2 drops to mid 80's when sitting to edge of chair to use urinal, rebounds to 92-94% after sitting kramer in chair. Patient purse lip breathes. Monitor shows sinus tachycardia with rate 110-120.

## 2018-08-15 NOTE — PROGRESS NOTES
Physical Therapy  Facility/Department: [facility] AT THE [facility]  Daily Treatment Note  NAME: Edison Console  : 1965  MRN: [id number]    Date of Service: 8/15/2018    Discharge Recommendations:  Continue to assess pending progress        Patient Diagnosis(es): The primary encounter diagnosis was Acute respiratory failure with hypercapnia ([address] Diagnoses of Anxiety, Left-sided chest wall pain, and History of COPD were also pertinent to this visit. has a past medical history of Arthritis; COPD (chronic obstructive pulmonary disease) ([address] Hypertension; and Scoliosis. has a past surgical history that includes Tonsillectomy; other surgical history (Left, 2017); and Facial reconstruction surgery (Left, 2017). Restrictions  Restrictions/Precautions  Restrictions/Precautions: General Precautions, Fall Risk  Subjective   General  Chart Reviewed: Yes  Response To Previous Treatment: Patient with no complaints from previous session. Family / Caregiver Present: No  Subjective  Subjective: Pt reports L rib pain at 3-4/10 with coughing  General Comment  Comments: Nursing Antonia Mckeon RN) aware of pain level. Orientation  Orientation  Overall Orientation Status: Within Functional Limits  Objective      Transfers  Comment: refused to transfer  Ambulation 1  Comments: pt reused ambulation at this time due to trembling/ weak and unsteady. Exercises  Straight Leg Raise: x15  Quad Sets: x15  Heelslides: x15  Gluteal Sets: x15  Hip Abduction: x15  Knee Long Arc Quad: x15  Knee Short Arc Quad: x15  Ankle Pumps: 15x2  Comments: B LE ther ex completed supine                        Assessment   Body structures, Functions, Activity limitations: Decreased functional mobility ; Decreased strength;Decreased endurance;Decreased balance;Decreased coordination  Assessment: Pt remains limited due to anxiety, SOB. SP02 drpped fjrom94% to 86% with activity.   Treatment Diagnosis: general debility, difficulty walking   Prognosis:

## 2018-08-15 NOTE — PROGRESS NOTES
Physical Therapy  Facility/Department: [facility]  Daily Treatment Note  NAME: Radha Garay  : 1965  MRN: [id number]    Date of Service: 8/15/2018    Discharge Recommendations:  Continue to assess pending progress        Patient Diagnosis(es): The primary encounter diagnosis was Acute respiratory failure with hypercapnia ([facility] Diagnoses of Anxiety, Left-sided chest wall pain, and History of COPD were also pertinent to this visit. has a past medical history of Arthritis; COPD (chronic obstructive pulmonary disease) ([facility] Hypertension; and Scoliosis. has a past surgical history that includes Tonsillectomy; other surgical history (Left, 2017); and Facial reconstruction surgery (Left, 2017). Restrictions  Restrictions/Precautions  Restrictions/Precautions: General Precautions, Fall Risk  Subjective   General  Chart Reviewed: Yes  Response To Previous Treatment: Patient with no complaints from previous session. Family / Caregiver Present: No  Subjective  Subjective: Pt reports L rib pain at 5-6/10 due to coughing  General Comment  Comments: Nursing Ney Reynolds RN) aware of pain level. Orientation  Orientation  Overall Orientation Status: Within Functional Limits  Objective      Transfers  Comment: refused to transfer  Ambulation 1  Comments: pt reused ambulation at this time due to trembling/ weak and unsteady. Exercises  Straight Leg Raise: x15  Quad Sets: x15  Heelslides: x15  Hip Abduction: x15  Knee Long Arc Quad: x15  Knee Short Arc Quad: x15  Ankle Pumps: 15x2  Comments: seated marching x15, B LE the ex completed reclined and seated                        Assessment   Body structures, Functions, Activity limitations: Decreased functional mobility ; Decreased strength;Decreased endurance;Decreased balance;Decreased coordination  Assessment: Pt limited anxiety, weakness, trenbling. SP02 dropped to 84-84% with activity took 1-2 minutes for increase to 94-96%.  Has to take rest break every 2 exercises due to SOB  Treatment Diagnosis: general debility, difficulty walking   Prognosis: Fair  Patient Education: Reviewed importance of ambulation and exercise daily. Pt verbalized appropriate understanding  REQUIRES PT FOLLOW UP: Yes     G-Code     OutComes Score                                                    AM-PAC Score             Goals  Short term goals  Time Frame for Short term goals: 14 visits   Short term goal 1: Patient will demonstrate the ability to ambulate 50ftx1 with LRAD with SBAx1 in order to ease ADLS   Short term goal 2: Patient will demonstrate the ability to complete bed mobility and transfers with SBAx1 in order to ease functional mobility   Short term goal 3: Patient will demonstrate the ability to ascend/descend 5 steps with use of HR and supervision assistance in order to safely get in/out of the home   Short term goal 4: Patient will tolerate 35-45' ther-ex in order to increase endurance and ease ADLs     Plan    Plan  Times per week: 7x/week   Times per day: Twice a day (1x/day on weekends)  Current Treatment Recommendations: Strengthening, Balance Training, Functional Mobility Training, Transfer Training, Endurance Training, Gait Training, Stair training, Neuromuscular Re-education, Pain Management, Home Exercise Program, Safety Education & Training, Patient/Caregiver Education & Training, Equipment Evaluation, Education, & procurement  Safety Devices  Type of devices:  All fall risk precautions in place, Call light within reach, Left in chair, Nurse notified (respiratory in room)     Therapy Time   Individual Concurrent Group Co-treatment   Time In 0845         Time Out 0912         Minutes [address] [place], [place] [id number]

## 2018-08-15 NOTE — PROGRESS NOTES
RESPIRATORY ASSESSMENT PROTOCOL                                                                                              Patient Name: Celeste Cap Room#: M041/H269-14 : 1965     Admitting diagnosis: Acute on chronic respiratory failure (HCC) [J96.20]  Acute on chronic respiratory failure ([facility] [J96.20]       Medical History:   Past Medical History:   Diagnosis Date    Arthritis     COPD (chronic obstructive pulmonary disease) ([facility]     Hypertension     Scoliosis        PATIENT ASSESSMENT    LABORATORY DATA  Hematology:   Lab Results   Component Value Date    WBC 8.5 2018    RBC 3.45 2018    HGB 11.6 2018    HCT 34.4 2018     2018     Chemistry:    Lab Results   Component Value Date    PHART 7.431 2018    VDS3ULH 43.6 2018    PO2ART 70.7 2018    K3BSGQLP 94.6 2018    LNK1CWW 28.4 2018    PBEA 3.5 2018       Blood Culture:   Sputum Culture:     VITALS  Pulse: 79   Resp: 24  BP: 101/77  SpO2: 100 % O2 Device: Bi-PAP  Temp: 97.6 °F (36.4 °C)  Comment:     SKIN COLOR  [x] Normal  [] Pale  [] Dusky  [] Cyanotic      RESPIRATORY PATTERN  [] Normal  [x] Dyspnea  [] Cheyne-Lawrence  [] Kussmaul  [] Biots    AMBULATORY  [] Yes  [] No  [x] With Assistance    PEAK FLOW  Predicted:     Personal Best:        VITAL CAPACITY  Predicted value:  ml  Actual Value:  ml  30% of Predicted:  Ml    Patient Acuity 0 1 2 3 4 Score   Level of Concious (LOC) [x]  Alert & Oriented or Pt normal LOC []  Confused;follows directions []  Confused & uncooper-ative []  Obtunded []  Comatose 0   Respiratory Rate  (RR) []  Reg. rate & pattern. 12 - 20 bpm  []  Increased RR. Greater than 20 bpm   []  SOB w/ exertion or RR greater than 24 bpm []  Access- ory muscle use at rest. Abn.  resp.  [x]  SOB at rest.   4   Bilateral Breath Sounds (BBS) []  Clear []  Diminish-ed bases  [x]  Diminish-ed t/o, or rales   []  Sporadic, scattered wheezes or rhonchi if condition deteriorates. HHN AEROSOL THERAPY with  [physician-ordered bronchodilator(s)]  QID and Albuterol PRN q4 hrs. Breath-Actuated Neb if BBS Acuity = 4, and pt. can use MP. Notify physician if condition deteriorates. MDI THERAPY with  2 actuations of [physician-ordered bronchodilator(s)] via spacer TID Albuterol and PRNq4 hrs. If unable to utilize MDI: HHN [physician-ordered bronchodilator(s)] TID and Albuterol PRN q4 hrs. Notify physician if condition deteriorates. MDI THERAPY with  [physician-ordered bronchodilator(s)] via spacer TID PRN. If unable to utilize MDI: HHN [physician-ordered bronchodilator(s)] TID PRN. Notify physician if condition deteriorates. If Acuity Level is 2, 3, or 4 in any of the following:    [] COUGH     [] SURGICAL HISTORY (SURG HX)  [] CHEST XRAY (CXR)    Goal: Improvement in sputum mobilization in patients with ineffective airway clearance. Reverse atelectasis. [] Bronchopulmonary Hygiene Protocol    Total Acuity:   16-32  []  Secondary Assessment in 24 hrs Total Acuity:  9-15  []  Secondary Assessment in 24 hrs Total Acuity:  4-8  []  Secondary Assessment in 48 hrs Total Acuity:  0-3  []  Secondary Assessment in 72 hrs   METANEB QID with [physician-ordered bronchodilator(s)] if CXR Acuity = 4; otherwise:  PD&P, PEP, or Vest QID & PRN  NT Sxn PRN for ineffective cough  METANEB QID with [physician-ordered bronchodilator(s)] if CXR Acuity = 4; otherwise:  PD&P, PEP, or Vest TID & PRN  NT Sxn PRN for ineffective cough  Instruct patient to self-perform IS q1hr WA   Directed Cough self-performed q1hr WA     If Acuity Level is 2 or above in the following:    [] PULMONARY HISTORY (PULM HX)    Goal: Assist patient in quitting smoking to slow or stop the progression of lung disease.     [] Smoking Cessation Protocol    SMOKING CESSATION EDUCATION provided according to policy HM_731: (delgado with an X)  ____Yes    ____ No     ____ NA    Smoking Cessation Booklet

## 2018-08-15 NOTE — PLAN OF CARE
Problem: Pain:  Goal: Pain level will decrease  Pain level will decrease   Outcome: Ongoing  Patient has declined pain medication today. Rates left rib cage pain 4/10 with cough. Ct Scan today revealed nondisplaced fracture of the lateral 8th left rib. Goal: Control of acute pain  Control of acute pain   Outcome: Met This Shift  Patient declines pain medication today. Ultram available if needed for pain. Problem: Falls - Risk of:  Goal: Will remain free from falls  Will remain free from falls   Outcome: Ongoing  Falling star program in place. Fall risk calculated at 39. Patient alert and oriented to own ability and uses call light appropriately. No Falls as of present. Problem: Activity Intolerance:  Goal: Ability to tolerate increased activity will improve  Ability to tolerate increased activity will improve   Outcome: Ongoing  Patient declined to walk today due to dyspnea with exertion and shaking with increased activity. Up to chair. PT/OT perform. Exercises with patient. Will implement increased activity as tolerated. Problem: Breathing Pattern - Ineffective:  Goal: Ability to achieve and maintain a regular respiratory rate will improve  Ability to achieve and maintain a regular respiratory rate will improve   Outcome: Ongoing  Patient continues to have dyspnea with exertion. Respirations labored at rest at times. Problem: Gas Exchange - Impaired:  Goal: Levels of oxygenation will improve  Levels of oxygenation will improve   Outcome: Ongoing  Off bipap this AM and on oxygen at 2 liters nasal cannula to maintain SAO2 within normal limits. Head of bed elevation controlled per patient for comfort. Will use bipap as indicated. Ativan IV ordered for patient's anxiety. Nebulizers per respiratory therapy as ordered.

## 2018-08-15 NOTE — PROGRESS NOTES
Resting with eyes closed, no distress noted. BIPAP remains in place FiO2 is at 60%. Call light within reach. Will continue to monitor.

## 2018-08-16 PROBLEM — S22.32XA CLOSED FRACTURE OF ONE RIB OF LEFT SIDE: Status: ACTIVE | Noted: 2018-08-16

## 2018-08-16 LAB
ANION GAP SERPL CALCULATED.3IONS-SCNC: 9 MMOL/L (ref 9–17)
BUN BLDV-MCNC: 27 MG/DL (ref 6–20)
BUN/CREAT BLD: 42 (ref 9–20)
CALCIUM SERPL-MCNC: 9.1 MG/DL (ref 8.6–10.4)
CHLORIDE BLD-SCNC: 94 MMOL/L (ref 98–107)
CO2: 38 MMOL/L (ref 20–31)
CREAT SERPL-MCNC: 0.65 MG/DL (ref 0.7–1.2)
GFR AFRICAN AMERICAN: >60 ML/MIN
GFR NON-AFRICAN AMERICAN: >60 ML/MIN
GFR SERPL CREATININE-BSD FRML MDRD: ABNORMAL ML/MIN/{1.73_M2}
GFR SERPL CREATININE-BSD FRML MDRD: ABNORMAL ML/MIN/{1.73_M2}
GLUCOSE BLD-MCNC: 113 MG/DL (ref 70–99)
HCT VFR BLD CALC: 37.6 % (ref 40.7–50.3)
HEMOGLOBIN: 13 G/DL (ref 13–17)
MCH RBC QN AUTO: 33.3 PG (ref 25.2–33.5)
MCHC RBC AUTO-ENTMCNC: 34.6 G/DL (ref 28.4–34.8)
MCV RBC AUTO: 96.4 FL (ref 82.6–102.9)
NRBC AUTOMATED: 0 PER 100 WBC
PDW BLD-RTO: 12.9 % (ref 11.8–14.4)
PLATELET # BLD: 189 K/UL (ref 138–453)
PMV BLD AUTO: 9.3 FL (ref 8.1–13.5)
POTASSIUM SERPL-SCNC: 3.8 MMOL/L (ref 3.7–5.3)
RBC # BLD: 3.9 M/UL (ref 4.21–5.77)
SODIUM BLD-SCNC: 141 MMOL/L (ref 135–144)
WBC # BLD: 9.9 K/UL (ref 3.5–11.3)

## 2018-08-16 PROCEDURE — 6370000000 HC RX 637 (ALT 250 FOR IP): Performed by: FAMILY MEDICINE

## 2018-08-16 PROCEDURE — 6360000002 HC RX W HCPCS: Performed by: PHYSICIAN ASSISTANT

## 2018-08-16 PROCEDURE — 6360000002 HC RX W HCPCS: Performed by: INTERNAL MEDICINE

## 2018-08-16 PROCEDURE — 97116 GAIT TRAINING THERAPY: CPT

## 2018-08-16 PROCEDURE — 97110 THERAPEUTIC EXERCISES: CPT

## 2018-08-16 PROCEDURE — 2000000000 HC ICU R&B

## 2018-08-16 PROCEDURE — 6360000002 HC RX W HCPCS

## 2018-08-16 PROCEDURE — 94660 CPAP INITIATION&MGMT: CPT

## 2018-08-16 PROCEDURE — 6370000000 HC RX 637 (ALT 250 FOR IP): Performed by: PHYSICIAN ASSISTANT

## 2018-08-16 PROCEDURE — 94640 AIRWAY INHALATION TREATMENT: CPT

## 2018-08-16 PROCEDURE — 99222 1ST HOSP IP/OBS MODERATE 55: CPT | Performed by: INTERNAL MEDICINE

## 2018-08-16 PROCEDURE — 6370000000 HC RX 637 (ALT 250 FOR IP): Performed by: INTERNAL MEDICINE

## 2018-08-16 PROCEDURE — 85027 COMPLETE CBC AUTOMATED: CPT

## 2018-08-16 PROCEDURE — 2580000003 HC RX 258: Performed by: INTERNAL MEDICINE

## 2018-08-16 PROCEDURE — 97140 MANUAL THERAPY 1/> REGIONS: CPT

## 2018-08-16 PROCEDURE — 80048 BASIC METABOLIC PNL TOTAL CA: CPT

## 2018-08-16 PROCEDURE — 94664 DEMO&/EVAL PT USE INHALER: CPT

## 2018-08-16 PROCEDURE — 36415 COLL VENOUS BLD VENIPUNCTURE: CPT

## 2018-08-16 RX ORDER — LORAZEPAM 2 MG/ML
INJECTION INTRAMUSCULAR
Status: COMPLETED
Start: 2018-08-16 | End: 2018-08-16

## 2018-08-16 RX ADMIN — DILTIAZEM HYDROCHLORIDE 90 MG: 60 TABLET, FILM COATED ORAL at 20:21

## 2018-08-16 RX ADMIN — LORAZEPAM 0.5 MG: 2 INJECTION INTRAMUSCULAR; INTRAVENOUS at 16:52

## 2018-08-16 RX ADMIN — FAMOTIDINE 20 MG: 20 TABLET ORAL at 20:20

## 2018-08-16 RX ADMIN — FAMOTIDINE 20 MG: 20 TABLET ORAL at 08:27

## 2018-08-16 RX ADMIN — TRAMADOL HYDROCHLORIDE 50 MG: 50 TABLET, FILM COATED ORAL at 20:22

## 2018-08-16 RX ADMIN — DILTIAZEM HYDROCHLORIDE 90 MG: 60 TABLET, FILM COATED ORAL at 05:09

## 2018-08-16 RX ADMIN — CETIRIZINE HYDROCHLORIDE 10 MG: 10 TABLET, FILM COATED ORAL at 08:27

## 2018-08-16 RX ADMIN — MONTELUKAST SODIUM 10 MG: 10 TABLET, FILM COATED ORAL at 08:27

## 2018-08-16 RX ADMIN — LORAZEPAM 0.5 MG: 2 INJECTION INTRAMUSCULAR; INTRAVENOUS at 11:20

## 2018-08-16 RX ADMIN — DOXYCYCLINE HYCLATE 100 MG: 100 CAPSULE, GELATIN COATED ORAL at 10:59

## 2018-08-16 RX ADMIN — LORAZEPAM 0.5 MG: 2 INJECTION INTRAMUSCULAR; INTRAVENOUS at 05:09

## 2018-08-16 RX ADMIN — LOSARTAN POTASSIUM 50 MG: 50 TABLET, FILM COATED ORAL at 08:27

## 2018-08-16 RX ADMIN — LORAZEPAM 0.5 MG: 2 INJECTION INTRAMUSCULAR; INTRAVENOUS at 00:56

## 2018-08-16 RX ADMIN — Medication 10 ML: at 20:24

## 2018-08-16 RX ADMIN — METHYLPREDNISOLONE SODIUM SUCCINATE 60 MG: 125 INJECTION, POWDER, FOR SOLUTION INTRAMUSCULAR; INTRAVENOUS at 07:15

## 2018-08-16 RX ADMIN — THERA TABS 1 TABLET: TAB at 08:27

## 2018-08-16 RX ADMIN — CITALOPRAM 10 MG: 10 TABLET, FILM COATED ORAL at 08:27

## 2018-08-16 RX ADMIN — METHYLPREDNISOLONE SODIUM SUCCINATE 60 MG: 125 INJECTION, POWDER, FOR SOLUTION INTRAMUSCULAR; INTRAVENOUS at 00:54

## 2018-08-16 RX ADMIN — APIXABAN 5 MG: 5 TABLET, FILM COATED ORAL at 08:27

## 2018-08-16 RX ADMIN — APIXABAN 5 MG: 5 TABLET, FILM COATED ORAL at 20:20

## 2018-08-16 RX ADMIN — MOMETASONE FUROATE AND FORMOTEROL FUMARATE DIHYDRATE 2 PUFF: 200; 5 AEROSOL RESPIRATORY (INHALATION) at 10:20

## 2018-08-16 RX ADMIN — Medication 10 ML: at 07:16

## 2018-08-16 RX ADMIN — DILTIAZEM HYDROCHLORIDE 90 MG: 60 TABLET, FILM COATED ORAL at 13:59

## 2018-08-16 RX ADMIN — IPRATROPIUM BROMIDE AND ALBUTEROL SULFATE 1 AMPULE: .5; 3 SOLUTION RESPIRATORY (INHALATION) at 05:26

## 2018-08-16 RX ADMIN — GUAIFENESIN AND DEXTROMETHORPHAN HYDROBROMIDE 1 TABLET: 600; 30 TABLET, EXTENDED RELEASE ORAL at 08:27

## 2018-08-16 RX ADMIN — LORAZEPAM 0.5 MG: 2 INJECTION INTRAMUSCULAR; INTRAVENOUS at 20:56

## 2018-08-16 RX ADMIN — GUAIFENESIN AND DEXTROMETHORPHAN HYDROBROMIDE 1 TABLET: 600; 30 TABLET, EXTENDED RELEASE ORAL at 20:20

## 2018-08-16 RX ADMIN — Medication 10 ML: at 18:40

## 2018-08-16 RX ADMIN — FOLIC ACID 1 MG: 1 TABLET ORAL at 08:27

## 2018-08-16 RX ADMIN — MOMETASONE FUROATE AND FORMOTEROL FUMARATE DIHYDRATE 2 PUFF: 200; 5 AEROSOL RESPIRATORY (INHALATION) at 21:02

## 2018-08-16 RX ADMIN — DOXYCYCLINE HYCLATE 100 MG: 100 CAPSULE, GELATIN COATED ORAL at 21:03

## 2018-08-16 RX ADMIN — THIAMINE HCL (VITAMIN B1) 50 MG TABLET 100 MG: at 08:27

## 2018-08-16 RX ADMIN — METHYLPREDNISOLONE SODIUM SUCCINATE 60 MG: 125 INJECTION, POWDER, FOR SOLUTION INTRAMUSCULAR; INTRAVENOUS at 13:59

## 2018-08-16 RX ADMIN — IPRATROPIUM BROMIDE AND ALBUTEROL SULFATE 1 AMPULE: .5; 3 SOLUTION RESPIRATORY (INHALATION) at 20:49

## 2018-08-16 RX ADMIN — IPRATROPIUM BROMIDE AND ALBUTEROL SULFATE 1 AMPULE: .5; 3 SOLUTION RESPIRATORY (INHALATION) at 16:22

## 2018-08-16 RX ADMIN — IPRATROPIUM BROMIDE AND ALBUTEROL SULFATE 1 AMPULE: .5; 3 SOLUTION RESPIRATORY (INHALATION) at 10:20

## 2018-08-16 RX ADMIN — METHYLPREDNISOLONE SODIUM SUCCINATE 60 MG: 125 INJECTION, POWDER, FOR SOLUTION INTRAMUSCULAR; INTRAVENOUS at 18:40

## 2018-08-16 ASSESSMENT — PAIN SCALES - GENERAL
PAINLEVEL_OUTOF10: 0
PAINLEVEL_OUTOF10: 0
PAINLEVEL_OUTOF10: 8
PAINLEVEL_OUTOF10: 0
PAINLEVEL_OUTOF10: 0

## 2018-08-16 ASSESSMENT — PAIN DESCRIPTION - ORIENTATION: ORIENTATION: LEFT;POSTERIOR

## 2018-08-16 ASSESSMENT — PAIN DESCRIPTION - FREQUENCY: FREQUENCY: INTERMITTENT

## 2018-08-16 ASSESSMENT — PAIN DESCRIPTION - PAIN TYPE: TYPE: ACUTE PAIN

## 2018-08-16 ASSESSMENT — PAIN DESCRIPTION - LOCATION: LOCATION: RIB CAGE

## 2018-08-16 NOTE — CONSULTS
PULMONARY CONSULT NOTE      Patient:  Emy Macedo  MRN: [id number]    Consulting Physician: Dr. Ana Phillips  Reason for Consult: Acute on chronic hypoxic respiratory failure  Primacy Care Physician: ABHIJEET Ramires CNP    HISTORY OF PRESENT ILLNESS:   The patient is a 48 y.o. male   He has history of very severe COPD, advanced emphysematous changes, chronic dyspnea on minimal activity grade 3 to grade 4, history of chronic cough, long history of his smoking and is still tobacco use, chronic respiratory failure on long-term oxygen therapy for last few years. He was admitted initially in June when he was here treated for acute on chronic respiratory failure and COPD exacerbation and was discharged according to patient she did okay for a few weeks and then he started having increasing shortness of breath on activity and rest and gradually was getting worse and was admitted about a week ago with worsening shortness of breath increasing cough he said that he could not catch his breath and he came to emergency room, his initial blood gases shows pH of 7.29 PCO2 of 77 and bicarbonate of 36 consistent with acute on chronic hypercapnic respiratory failure, he was admitted to ICU and he is been in ICU since then he was treated with IV steroids and require BiPAP, currently he is using BiPAP at night he claims that he feels better with the BiPAP and during the daytime he is able to tolerate O2 at 2 L nasal cannula, he claims that he is feeling slightly better in last few days.   He has chronic dyspnea on minimal activity usually he can walk 20-25 feet and he get short of breath he is using oxygen all the time, he has chronic cough with chronic sputum production but denies any change in the color of the sputum and denies hemoptysis, he claimed that he had chest pain because of the coughing and chest pain is localized on the left side of the chest wall, he denies any fever or chills, he does claim that he lost some weight and for  myalgias, arthralgias, joint swelling and stiff joints  NEUROLOGICAL:  negative for headaches, dizziness, seizures, memory problems, speech problems, visual disturbance, coordination problems, gait problems, dysphagia, weakness, numbness, syncope and tingling  BEHAVIOR/PSYCH:  positive for decreased energy level, poor concentration and anxiety        Physical Exam:    Vitals: /75   Pulse 99   Temp 97.8 °F (36.6 °C) (Temporal)   Resp 22   Ht 5' 6\" (1.676 m)   Wt 108 lb 11.2 oz (49.3 kg)   SpO2 95%   BMI 17.54 kg/m²     Physical Examination:   General appearance - oriented to person, place, and time, in mild to moderate distress and chronically ill appearing  Mental status - alert, oriented to person, place, and time  Eyes - pupils equal and reactive, extraocular eye movements intact, sclera anicteric  Ears - right ear normal, left ear normal  Nose - normal and patent, no erythema, discharge or polyps  Mouth - mucous membranes moist, pharynx normal without lesions  Neck - supple, no significant adenopathy  Chest - positive tachypnea at rest, no accessory muscles use, bilateral symmetrical chest movement, increase resonance on percussion bilaterally and decreased diaphragm excursion, air entry is severely reduced and distant breath sounds bilaterally, prolonged expiration, no expiratory wheezing and no rhonchi and no crackles  Heart - normal rate, regular rhythm, normal S1, S2, no murmurs, rubs, clicks or gallops  Abdomen - soft, nontender, nondistended, no masses or organomegaly  Neurological - alert, oriented, normal speech, no focal findings or movement disorder noted}  Extremities - peripheral pulses normal, no pedal edema, no clubbing or cyanosis  Skin - normal coloration and turgor, no rashes, no suspicious skin lesions noted     Medications:Current Inpatient    Scheduled Meds:   dextromethorphan-guaiFENesin  1 tablet Oral BID    multivitamin  1 tablet Oral Daily    folic acid  1 mg Oral with questions. Tigist Pan MD             8/16/2018, 6:36 PM    Please note that this chart was generated using voice recognition Dragon dictation software. Although every effort was made to ensure the accuracy of this automated transcription, some errors in transcription may have occurred.

## 2018-08-16 NOTE — PROGRESS NOTES
Physical Therapy  Facility/Department: [address] [facility] ICU  Daily Treatment Note  NAME: Macrina Gmoez  : 1965  MRN: [id number]    Date of Service: 2018    Discharge Recommendations:  Continue to assess pending progress        Patient Diagnosis(es): The primary encounter diagnosis was Acute respiratory failure with hypercapnia ([facility] Diagnoses of Anxiety, Left-sided chest wall pain, and History of COPD were also pertinent to this visit. has a past medical history of Arthritis; COPD (chronic obstructive pulmonary disease) ([address] Hypertension; and Scoliosis. has a past surgical history that includes Tonsillectomy; other surgical history (Left, 2017); and Facial reconstruction surgery (Left, 2017). Restrictions  Restrictions/Precautions  Restrictions/Precautions: General Precautions, Fall Risk  Subjective   General  Chart Reviewed: Yes  Response To Previous Treatment: Patient with no complaints from previous session. Family / Caregiver Present: No  Subjective  Subjective: Pt states he feels \"not the greatest overall\". Pt states he has anxiety and when asked if we could help him at all he stated his \"medication wasnt legal yet\". Pt denies pain currently just achey. Orientation  Orientation  Overall Orientation Status: Within Functional Limits  Objective   Bed mobility  Rolling to Right: Stand by assistance  Supine to Sit: Stand by assistance  Transfers  Sit to Stand: Contact guard assistance  Stand to sit: Contact guard assistance  Ambulation  Ambulation?: Yes  WB Status: Full  Ambulation 1  Surface: level tile  Device: No Device  Other Apparatus: O2  Assistance: Contact guard assistance  Quality of Gait: SLow saul, Short steps this session, SLight LOB with direction change and SOB with amb.    Distance: 70ft x 1, 45'x1        Exercises  Hip Flexion: 20x  Hip Abduction: 20x  Knee Long Arc Quad: 20x  Knee Active Range of Motion: 20x  Ankle Pumps: 20x2  Comments: Ex completed in sitting

## 2018-08-16 NOTE — PROGRESS NOTES
Pt ambulated to bathroom and back to bed, tolerated well, pt c/o SOB and anxiety when returned to bed, ativan given per prn orders.

## 2018-08-16 NOTE — PROGRESS NOTES
ICU Progress Note    SUBJECTIVE/INTERVAL HISTORY:    Patient seen in follow up for acute on chronic respiratory failure, PAF, COPD exacerbation, HTN, tobacco abuse, EtOH abuse, left 8th rib fx. BIPAP at night. Slept better. Less anxiety. HR improved. Still has cough. No events overnight. CT CHEST W CONTRAST [[id number]] Collected: 08/15/18 1015   Updated: 08/15/18 1247    Narrative:     REPORT: CT chest with contrast    TECHNIQUE: Contiguous axial slices through the chest obtained after administration of 100 mL Isovue-300 IV as per protocol. Axial, coronal and sagittal reformats were made from the source images. INDICATION: COPD exacerbation, shortness of breath, left rib pain, not improving      FINDINGS: No previous examinations available for comparison.  Advanced underlying centrilobular emphysema. No focal consolidation, pleural effusion or pneumothorax. No mediastinal or hilar lymphadenopathy. Normal cardiac size. Nonaneurysmal thoracic   aorta. Visualized upper abdomen is normal. Subtle nondisplaced fracture of the lateral left 8th rib. Thoracic spine is unremarkable.       Final report electronically signed by Lara Beasley on 8/15/2018 12:45 PM   Impression:     Nondisplaced fracture lateral left 8th rib         OBJECTIVE:    Vitals:   Temp: 97 °F (36.1 °C)  Temp range:    Temp  Av.5 °F (36.9 °C)  Min: 97 °F (36.1 °C)  Max: 99.7 °F (37.6 °C)    BP: 119/80  BP Range:      Systolic (40PXW), FMD:633 , Min:106 , QHB:592      Diastolic (03KZQ), RLR:39, Min:67, Max:98    Pulse: 85  Pulse Range:    Pulse  Av.3  Min: 69  Max: 120    Resp: 18  Resp Range:   Resp  Av.7  Min: 15  Max: 26    SpO2: 93 % on supplemental O2  SpO2 range:   SpO2  Av.8 %  Min: 90 %  Max: 97 %    24HR INTAKE/OUTPUT:      Intake/Output Summary (Last 24 hours) at 1852  Last data filed at 18 0716   Gross per 24 hour   Intake              741 ml   Output             2940 ml   Net            -2199 ml management to this patient.   This excludes time spent in performing separately billed procedures

## 2018-08-16 NOTE — PROGRESS NOTES
PALLIATIVE CARE  Follow up visit with Maxim Ham. He is still in ICU. He states that the medication for anxiety is helping, but that he is still anxious. More anxiety at night. States that he feels better with the BiPap on. We talk more about Palliative Care support today. He is agreeable as he desires his anxiety and depression to be better managed. States will need support \"until the end\". Was started on Celexa yesterday for his depression. Discussed use of BiPap at home and that they will likely try to qualify him for this when he is at rehab. He states \"I told the doctor I don't think I can live without it\". DNR discussion. He tells me that he told his girlfriend this morning that he is agreeable to a ventilator \"for a couple of days only\", then take him off. Discussion about his lung condition and that it may take more than a couple of days to get him off the ventilator, if they are able to do so at all. Needs kwasi conversation from PCP or pulmonology regarding code status due to his end stage lung disease. Support given. Discussion with Maria Elena Whittington regarding home BiPap who states that rehab will qualify him for this. Will continue to follow and support pt. Encouragement given. Denies other needs at this time. Claudette Farmer RN, Yoli Cruz and Chris Horton Nurse Coordinator  8/16/2018 11:57 AM

## 2018-08-16 NOTE — PROGRESS NOTES
Medications given at this time. Patient requesting to get ativan whenever it is due. Vinetta Gowers, RT at bedside giving breathing treatment and will put patient on bipap for the night.

## 2018-08-16 NOTE — PROGRESS NOTES
Patient very anxious just sitting on side of bed using urinal. Requesting ativan and breathing treatment at this time.

## 2018-08-16 NOTE — PROGRESS NOTES
Physical Therapy  Facility/Department: [facility] ICU  Daily Treatment Note  NAME: Kendra Mitchell  : 1965  MRN: [id number]    Date of Service: 2018    Discharge Recommendations:  Continue to assess pending progress        Patient Diagnosis(es): The primary encounter diagnosis was Acute respiratory failure with hypercapnia ([facility] [address] Diagnoses of Anxiety, Left-sided chest wall pain, and History of COPD were also pertinent to this visit. has a past medical history of Arthritis; COPD (chronic obstructive pulmonary disease) ([facility] [place] [address] Hypertension; and Scoliosis. has a past surgical history that includes Tonsillectomy; other surgical history (Left, 2017); and Facial reconstruction surgery (Left, 2017). Restrictions  Restrictions/Precautions  Restrictions/Precautions: General Precautions, Fall Risk  Subjective   General  Chart Reviewed: Yes  Response To Previous Treatment: Patient with no complaints from previous session. Family / Caregiver Present: No  Subjective  Subjective: Pt reports \"whole body hurts, achy\"          Orientation  Orientation  Overall Orientation Status: Within Functional Limits  Objective   Bed mobility  Supine to Sit: Stand by assistance  Sit to Supine: Stand by assistance  Transfers  Sit to Stand: Contact guard assistance  Stand to sit: Contact guard assistance  Comment: Slight unsteadiness noted with mobility--CGA to ensure safety. Ambulation  Ambulation?: Yes  Ambulation 1  Surface: level tile  Device: No Device  Other Apparatus: O2 (2L)  Assistance: Contact guard assistance  Quality of Gait: Slighty unsteady, primarily with directional changes--pt able to self correct.    Distance: 70ft x 1  Stairs/Curb  Stairs?: No     Balance  Standing - Static: Fair;+  Standing - Dynamic: Fair;+  Exercises  Straight Leg Raise: 15-20x  Quad Sets: 15-20x  Heelslides: 15-20x  Gluteal Sets: 20x  Hip Flexion: 15-20x2  Hip Abduction: 15-20x2  Knee Long Arc Quad: 20x  Knee Active Range of Motion: Group Co-treatment   Time In 0930         Time Out 1005         Minutes [facility] [place], [place] [address]

## 2018-08-16 NOTE — PROGRESS NOTES
if condition deteriorates. HHN AEROSOL THERAPY with  [physician-ordered bronchodilator(s)]  QID and Albuterol PRN q4 hrs. Breath-Actuated Neb if BBS Acuity = 4, and pt. can use MP. Notify physician if condition deteriorates. MDI THERAPY with  2 actuations of [physician-ordered bronchodilator(s)] via spacer TID Albuterol and PRNq4 hrs. If unable to utilize MDI: HHN [physician-ordered bronchodilator(s)] TID and Albuterol PRN q4 hrs. Notify physician if condition deteriorates. MDI THERAPY with  [physician-ordered bronchodilator(s)] via spacer TID PRN. If unable to utilize MDI: HHN [physician-ordered bronchodilator(s)] TID PRN. Notify physician if condition deteriorates. If Acuity Level is 2, 3, or 4 in any of the following:    [] COUGH     [] SURGICAL HISTORY (SURG HX)  [] CHEST XRAY (CXR)    Goal: Improvement in sputum mobilization in patients with ineffective airway clearance. Reverse atelectasis. [] Bronchopulmonary Hygiene Protocol    Total Acuity:   16-32  []  Secondary Assessment in 24 hrs Total Acuity:  9-15  []  Secondary Assessment in 24 hrs Total Acuity:  4-8  []  Secondary Assessment in 48 hrs Total Acuity:  0-3  []  Secondary Assessment in 72 hrs   METANEB QID with [physician-ordered bronchodilator(s)] if CXR Acuity = 4; otherwise:  PD&P, PEP, or Vest QID & PRN  NT Sxn PRN for ineffective cough  METANEB QID with [physician-ordered bronchodilator(s)] if CXR Acuity = 4; otherwise:  PD&P, PEP, or Vest TID & PRN  NT Sxn PRN for ineffective cough  Instruct patient to self-perform IS q1hr WA   Directed Cough self-performed q1hr WA     If Acuity Level is 2 or above in the following:    [] PULMONARY HISTORY (PULM HX)    Goal: Assist patient in quitting smoking to slow or stop the progression of lung disease.     [] Smoking Cessation Protocol    SMOKING CESSATION EDUCATION provided according to policy HG_900: (delgado with an X)  ____Yes    ____ No     ____ NA    Smoking Cessation Booklet

## 2018-08-17 LAB
ANION GAP SERPL CALCULATED.3IONS-SCNC: 6 MMOL/L (ref 9–17)
BUN BLDV-MCNC: 26 MG/DL (ref 6–20)
BUN/CREAT BLD: 41 (ref 9–20)
CALCIUM SERPL-MCNC: 8.9 MG/DL (ref 8.6–10.4)
CHLORIDE BLD-SCNC: 94 MMOL/L (ref 98–107)
CO2: 40 MMOL/L (ref 20–31)
CREAT SERPL-MCNC: 0.64 MG/DL (ref 0.7–1.2)
GFR AFRICAN AMERICAN: >60 ML/MIN
GFR NON-AFRICAN AMERICAN: >60 ML/MIN
GFR SERPL CREATININE-BSD FRML MDRD: ABNORMAL ML/MIN/{1.73_M2}
GFR SERPL CREATININE-BSD FRML MDRD: ABNORMAL ML/MIN/{1.73_M2}
GLUCOSE BLD-MCNC: 117 MG/DL (ref 70–99)
HCT VFR BLD CALC: 34.9 % (ref 40.7–50.3)
HEMOGLOBIN: 11.8 G/DL (ref 13–17)
MCH RBC QN AUTO: 33.6 PG (ref 25.2–33.5)
MCHC RBC AUTO-ENTMCNC: 33.8 G/DL (ref 28.4–34.8)
MCV RBC AUTO: 99.4 FL (ref 82.6–102.9)
NRBC AUTOMATED: 0 PER 100 WBC
PDW BLD-RTO: 13 % (ref 11.8–14.4)
PLATELET # BLD: 173 K/UL (ref 138–453)
PMV BLD AUTO: 9.4 FL (ref 8.1–13.5)
POTASSIUM SERPL-SCNC: 3.8 MMOL/L (ref 3.7–5.3)
RBC # BLD: 3.51 M/UL (ref 4.21–5.77)
SODIUM BLD-SCNC: 140 MMOL/L (ref 135–144)
WBC # BLD: 7.4 K/UL (ref 3.5–11.3)

## 2018-08-17 PROCEDURE — 6370000000 HC RX 637 (ALT 250 FOR IP): Performed by: PHYSICIAN ASSISTANT

## 2018-08-17 PROCEDURE — 85027 COMPLETE CBC AUTOMATED: CPT

## 2018-08-17 PROCEDURE — 6370000000 HC RX 637 (ALT 250 FOR IP): Performed by: INTERNAL MEDICINE

## 2018-08-17 PROCEDURE — 97110 THERAPEUTIC EXERCISES: CPT

## 2018-08-17 PROCEDURE — 97530 THERAPEUTIC ACTIVITIES: CPT

## 2018-08-17 PROCEDURE — 94640 AIRWAY INHALATION TREATMENT: CPT

## 2018-08-17 PROCEDURE — 97535 SELF CARE MNGMENT TRAINING: CPT

## 2018-08-17 PROCEDURE — 2000000000 HC ICU R&B

## 2018-08-17 PROCEDURE — 80048 BASIC METABOLIC PNL TOTAL CA: CPT

## 2018-08-17 PROCEDURE — 6360000002 HC RX W HCPCS: Performed by: PHYSICIAN ASSISTANT

## 2018-08-17 PROCEDURE — 6360000002 HC RX W HCPCS: Performed by: INTERNAL MEDICINE

## 2018-08-17 PROCEDURE — 94664 DEMO&/EVAL PT USE INHALER: CPT

## 2018-08-17 PROCEDURE — 97116 GAIT TRAINING THERAPY: CPT

## 2018-08-17 PROCEDURE — 94660 CPAP INITIATION&MGMT: CPT

## 2018-08-17 PROCEDURE — 2580000003 HC RX 258: Performed by: INTERNAL MEDICINE

## 2018-08-17 PROCEDURE — 36415 COLL VENOUS BLD VENIPUNCTURE: CPT

## 2018-08-17 PROCEDURE — 6370000000 HC RX 637 (ALT 250 FOR IP): Performed by: FAMILY MEDICINE

## 2018-08-17 RX ORDER — PREDNISONE 10 MG/1
10 TABLET ORAL DAILY
Status: DISCONTINUED | OUTPATIENT
Start: 2018-08-26 | End: 2018-08-20 | Stop reason: HOSPADM

## 2018-08-17 RX ORDER — PREDNISONE 20 MG/1
40 TABLET ORAL DAILY
Status: COMPLETED | OUTPATIENT
Start: 2018-08-17 | End: 2018-08-19

## 2018-08-17 RX ORDER — PREDNISONE 20 MG/1
20 TABLET ORAL DAILY
Status: DISCONTINUED | OUTPATIENT
Start: 2018-08-23 | End: 2018-08-20 | Stop reason: HOSPADM

## 2018-08-17 RX ORDER — ALPRAZOLAM 0.5 MG/1
0.5 TABLET ORAL EVERY 4 HOURS PRN
Status: DISCONTINUED | OUTPATIENT
Start: 2018-08-17 | End: 2018-08-20 | Stop reason: HOSPADM

## 2018-08-17 RX ORDER — PREDNISONE 20 MG/1
40 TABLET ORAL DAILY
Status: DISCONTINUED | OUTPATIENT
Start: 2018-08-17 | End: 2018-08-17 | Stop reason: SDUPTHER

## 2018-08-17 RX ADMIN — PREDNISONE 40 MG: 20 TABLET ORAL at 08:37

## 2018-08-17 RX ADMIN — LORAZEPAM 0.5 MG: 2 INJECTION INTRAMUSCULAR; INTRAVENOUS at 05:48

## 2018-08-17 RX ADMIN — Medication 10 ML: at 21:05

## 2018-08-17 RX ADMIN — DILTIAZEM HYDROCHLORIDE 90 MG: 60 TABLET, FILM COATED ORAL at 14:57

## 2018-08-17 RX ADMIN — FOLIC ACID 1 MG: 1 TABLET ORAL at 08:37

## 2018-08-17 RX ADMIN — METHYLPREDNISOLONE SODIUM SUCCINATE 60 MG: 125 INJECTION, POWDER, FOR SOLUTION INTRAMUSCULAR; INTRAVENOUS at 00:53

## 2018-08-17 RX ADMIN — DILTIAZEM HYDROCHLORIDE 90 MG: 60 TABLET, FILM COATED ORAL at 21:03

## 2018-08-17 RX ADMIN — THIAMINE HCL (VITAMIN B1) 50 MG TABLET 100 MG: at 08:36

## 2018-08-17 RX ADMIN — DILTIAZEM HYDROCHLORIDE 90 MG: 60 TABLET, FILM COATED ORAL at 05:48

## 2018-08-17 RX ADMIN — THERA TABS 1 TABLET: TAB at 08:37

## 2018-08-17 RX ADMIN — GUAIFENESIN AND DEXTROMETHORPHAN HYDROBROMIDE 1 TABLET: 600; 30 TABLET, EXTENDED RELEASE ORAL at 21:04

## 2018-08-17 RX ADMIN — ALPRAZOLAM 0.5 MG: 0.5 TABLET ORAL at 11:34

## 2018-08-17 RX ADMIN — APIXABAN 5 MG: 5 TABLET, FILM COATED ORAL at 08:37

## 2018-08-17 RX ADMIN — FAMOTIDINE 20 MG: 20 TABLET ORAL at 08:37

## 2018-08-17 RX ADMIN — IPRATROPIUM BROMIDE AND ALBUTEROL SULFATE 1 AMPULE: .5; 3 SOLUTION RESPIRATORY (INHALATION) at 16:33

## 2018-08-17 RX ADMIN — IPRATROPIUM BROMIDE AND ALBUTEROL SULFATE 1 AMPULE: .5; 3 SOLUTION RESPIRATORY (INHALATION) at 20:48

## 2018-08-17 RX ADMIN — FAMOTIDINE 20 MG: 20 TABLET ORAL at 21:04

## 2018-08-17 RX ADMIN — MOMETASONE FUROATE AND FORMOTEROL FUMARATE DIHYDRATE 2 PUFF: 200; 5 AEROSOL RESPIRATORY (INHALATION) at 20:48

## 2018-08-17 RX ADMIN — GUAIFENESIN AND DEXTROMETHORPHAN HYDROBROMIDE 1 TABLET: 600; 30 TABLET, EXTENDED RELEASE ORAL at 08:37

## 2018-08-17 RX ADMIN — Medication 10 ML: at 08:40

## 2018-08-17 RX ADMIN — IPRATROPIUM BROMIDE AND ALBUTEROL SULFATE 1 AMPULE: .5; 3 SOLUTION RESPIRATORY (INHALATION) at 05:30

## 2018-08-17 RX ADMIN — ALPRAZOLAM 0.5 MG: 0.5 TABLET ORAL at 21:03

## 2018-08-17 RX ADMIN — IPRATROPIUM BROMIDE AND ALBUTEROL SULFATE 1 AMPULE: .5; 3 SOLUTION RESPIRATORY (INHALATION) at 11:18

## 2018-08-17 RX ADMIN — LOSARTAN POTASSIUM 50 MG: 50 TABLET, FILM COATED ORAL at 08:37

## 2018-08-17 RX ADMIN — MOMETASONE FUROATE AND FORMOTEROL FUMARATE DIHYDRATE 2 PUFF: 200; 5 AEROSOL RESPIRATORY (INHALATION) at 11:18

## 2018-08-17 RX ADMIN — CETIRIZINE HYDROCHLORIDE 10 MG: 10 TABLET, FILM COATED ORAL at 08:37

## 2018-08-17 RX ADMIN — CITALOPRAM 10 MG: 10 TABLET, FILM COATED ORAL at 08:37

## 2018-08-17 RX ADMIN — MONTELUKAST SODIUM 10 MG: 10 TABLET, FILM COATED ORAL at 08:37

## 2018-08-17 RX ADMIN — APIXABAN 5 MG: 5 TABLET, FILM COATED ORAL at 21:04

## 2018-08-17 ASSESSMENT — PAIN DESCRIPTION - DESCRIPTORS: DESCRIPTORS: ACHING

## 2018-08-17 ASSESSMENT — PAIN SCALES - GENERAL
PAINLEVEL_OUTOF10: 7
PAINLEVEL_OUTOF10: 0
PAINLEVEL_OUTOF10: 4
PAINLEVEL_OUTOF10: 0

## 2018-08-17 ASSESSMENT — PAIN DESCRIPTION - ORIENTATION
ORIENTATION: LEFT
ORIENTATION: LEFT

## 2018-08-17 ASSESSMENT — PAIN DESCRIPTION - PAIN TYPE: TYPE: ACUTE PAIN

## 2018-08-17 ASSESSMENT — PAIN DESCRIPTION - LOCATION
LOCATION: RIB CAGE
LOCATION: RIB CAGE

## 2018-08-17 NOTE — PLAN OF CARE
Problem: Falls - Risk of:  Goal: Will remain free from falls  Will remain free from falls   Outcome: Ongoing  Pt up with assistance, call light in reach, instructed pt to use call light for assistance.

## 2018-08-17 NOTE — PROGRESS NOTES
Hermann Rose M.D. ICU Progress Note 18    SUBJECTIVE:    Pt seen and examined in the ICU for follow up of COPD exacerbation and Acute on chronic respiratory failure ([address] He is still anxious and short of breath. Less coughing. No wheezing. ROS:   Constitutional: negative  for fevers, and negative for chills. Respiratory: negative for shortness of breath, negative for cough, and negative for wheezing  Cardiovascular: negative for chest pain, and negative for palpitations  Gastrointestinal: negative for abdominal pain, negative for nausea,negative for vomiting, negative for diarrhea, and negative for constipation    All other systems were reviewed with the patient and are negative unless otherwise stated in HPI. OBJECTIVE:    Vitals:   Temp: 96.8 °F (36 °C)  Temp range:    Temp  Av.6 °F (36.4 °C)  Min: 96.8 °F (36 °C)  Max: 98.4 °F (36.9 °C)    BP: (!) 144/89  BP Range:      Systolic (35BEG), NXN:403 , Min:103 , ECD:580      Diastolic (23YPC), WJP:81, Min:65, Max:103    Pulse: 83  Pulse Range:    Pulse  Av.1  Min: 66  Max: 112    Resp: 20  Resp Range:   Resp  Av.8  Min: 14  Max: 24    SpO2: 97 % on supplemental O2  SpO2 range:   SpO2  Av.4 %  Min: 87 %  Max: 99 %    24HR INTAKE/OUTPUT:    Intake/Output Summary (Last 24 hours) at 18 0794  Last data filed at 18 0500   Gross per 24 hour   Intake              882 ml   Output              950 ml   Net              -68 ml     -----------------------------------------------------------------  Exam:  GEN:    Awake, alert and oriented x 3. no acute distress  EYES:  EOMI, pupils equal   NECK:  Supple. No lymphadenopathy. No carotid bruit  CVS:    regular rhythm, HR 80's to 90's currently  PULM:  diminished. no wheezing or rhonchi  ABD:    Bowels sounds normal.  Abdomen is soft. No distention. No tenderness. EXT:   1+ edema bilaterally . No calf tenderness. NEURO: Motor and sensory are intact  SKIN:  No rashes.   No

## 2018-08-17 NOTE — PROGRESS NOTES
Physical Therapy  Facility/Department: [facility] AT THE [facility]  Daily Treatment Note  NAME: Soniya Broderick  : 1965  MRN: [id number]    Date of Service: 2018    Discharge Recommendations:  Continue to assess pending progress        Patient Diagnosis(es): The primary encounter diagnosis was Acute respiratory failure with hypercapnia ([facility] [address] Diagnoses of Anxiety, Left-sided chest wall pain, and History of COPD were also pertinent to this visit. has a past medical history of Arthritis; COPD (chronic obstructive pulmonary disease) ([address] Hypertension; and Scoliosis. has a past surgical history that includes Tonsillectomy; other surgical history (Left, 2017); and Facial reconstruction surgery (Left, 2017). Restrictions  Restrictions/Precautions  Restrictions/Precautions: General Precautions, Fall Risk  Subjective   General  Chart Reviewed: Yes  Subjective  Subjective: Pt reported 4-5/10 L rib cage pain. General Comment  Comments: Nursing Storm Turpin RN) aware of pain level. Orientation  Orientation  Overall Orientation Status: Within Functional Limits  Objective   Bed mobility  Sit to Supine: Stand by assistance  Scooting: Stand by assistance  Transfers  Sit to Stand: Contact guard assistance  Stand to sit: Contact guard assistance  Comment: Verbal cues for hand placement/safety. Ambulation  Ambulation?: Yes  Ambulation 1  Surface: level tile  Device: No Device; [place] rail  Other Apparatus: O2  Assistance: Contact guard assistance  Distance: 50 feet x 1, 20 feet x 2  Comments: Pt needed frequent standing rest breaks d/t SOB/dizziness. Pt used handrail for support as needed. SPO2 86-87% after amb and increased to 92-93% after a seated rest break. Cues needed for proper breathing technique.    Stairs/Curb  Stairs?: No     Balance  Posture: Fair  Sitting - Static: Good  Sitting - Dynamic: Good  Standing - Static: Fair;+  Standing - Dynamic: Fair  Exercises  Hip Flexion: 20x  Hip Abduction: 20x  Knee Long Arc Quad: 20x  Ankle Pumps: 20x  Comments: Above exercises completed in seated. SPO2 90% or higher while completing ther ex. Assessment      Patient Education: Educated pt on safety with amb and on proper breathing technique. Pt with fair to good understanding. REQUIRES PT FOLLOW UP: Yes  Activity Tolerance  Activity Tolerance: Patient Tolerated treatment well;Patient limited by endurance     G-Code     OutComes Score    AM-PAC Score    Goals  Short term goals  Time Frame for Short term goals: 14 visits   Short term goal 1: Patient will demonstrate the ability to ambulate 50ftx1 with LRAD with SBAx1 in order to ease ADLS   Short term goal 2: Patient will demonstrate the ability to complete bed mobility and transfers with SBAx1 in order to ease functional mobility   Short term goal 3: Patient will demonstrate the ability to ascend/descend 5 steps with use of HR and supervision assistance in order to safely get in/out of the home   Short term goal 4: Patient will tolerate 35-45' ther-ex in order to increase endurance and ease ADLs     Plan    Plan  Times per week: 7x/week   Times per day: Twice a day (1x/day on weekends)  Current Treatment Recommendations: Strengthening, Balance Training, Functional Mobility Training, Transfer Training, Endurance Training, Gait Training, Stair training, Neuromuscular Re-education, Pain Management, Home Exercise Program, Safety Education & Training, Patient/Caregiver Education & Training, Equipment Evaluation, Education, & procurement  Safety Devices  Type of devices:  All fall risk precautions in place, Call light within reach, Left in bed, Nurse notified (No alarm upon entry.)     Therapy Time   Individual Concurrent Group Co-treatment   Time In 0830         Time Out 0917         Minutes [place], [place]

## 2018-08-17 NOTE — PROGRESS NOTES
Occupational Therapy  Facility/Department: [address] [place] [address]  Daily Treatment Note  NAME: Luke Giles  : 1965  MRN: [id number]    Date of Service: 2018    Discharge Recommendations:  Continue to assess pending progress       Patient Diagnosis(es): The primary encounter diagnosis was Acute respiratory failure with hypercapnia ([facility] [address] Diagnoses of Anxiety, Left-sided chest wall pain, and History of COPD were also pertinent to this visit. has a past medical history of Arthritis; COPD (chronic obstructive pulmonary disease) ([facility] [address] Hypertension; and Scoliosis. has a past surgical history that includes Tonsillectomy; other surgical history (Left, 2017); and Facial reconstruction surgery (Left, 2017). Restrictions  Restrictions/Precautions  Restrictions/Precautions: General Precautions, Fall Risk  Subjective   General  Chart Reviewed: Yes  Patient assessed for rehabilitation services?: Yes  Response to previous treatment: Patient with no complaints from previous session  Family / Caregiver Present: No  Subjective  Subjective: Pt agreed to tx, 7/10 pain reported. General Comment  Comments: Increased \"shakiness\" as tx progressed. Pain Assessment  Patient Currently in Pain: Yes  Pain Level: 7  Pain Location: Rib cage  Pain Orientation: Left  Vital Signs  Patient Currently in Pain: Yes   Orientation     Objective    ADL  Grooming: Setup;Supervision (Increased \"shakiness\" as tx progressed)  Toileting: Stand by assistance (Pt stood about 2 min to urinate, stated \" I can stand up longer than I could yesterday\". )  Additional Comments: Pt with G recall on [facility] handout provided in previous session. Balance  Sitting Balance: Independent  Standing Balance: Stand by assistance  Standing Balance  Sit to stand: Stand by assistance  Stand to sit: Stand by assistance  Comment: Increased \"shakiness\" as tx progressed.   Functional Mobility  Activity: To/from bathroom  Assist Level: Stand by assistance  Toilet Transfers  Toilet - Technique: Ambulating  Equipment Used: Standard toilet  Bed mobility  Rolling to Right: Stand by assistance  Supine to Sit: Stand by assistance  Sit to Supine: Stand by assistance  Scooting: Stand by assistance  Transfers  Sit to stand: Stand by assistance  Stand to sit: Stand by assistance                                                                 Assessment      Activity Tolerance  Activity Tolerance: Patient limited by fatigue  Safety Devices  Safety Devices in place: Yes  Type of devices: Call light within reach; Left in bed          Plan   Plan  Times per day: Daily  Current Treatment Recommendations: Strengthening, Balance Training, Functional Mobility Training, Endurance Training, Safety Education & Training, Self-Care / ADL, Patient/Caregiver Education & Training, Equipment Evaluation, Education, & procurement  G-Code     OutComes Score                                           AM-PAC Score             Goals  Short term goals  Time Frame for Short term goals: 3-5 days  Short term goal 1: Patient to state 5 EC/WS techniques to implement throughout I/ADL tasks. Short term goal 2: Patient to complete ADL routine while implementing EC/WS techniques and/or AE/DME to improve ADL independence and tolerance. Short term goal 3: Patient to engage in 15 min of ther ex/ther act with rest breaks PRN to improve UE/UB strength and activity tolerance.        Therapy Time   Individual Concurrent Group Co-treatment   Time In 1436         Time Out 1500         Minutes 24                 CHERRIE Ching

## 2018-08-18 LAB
ANION GAP SERPL CALCULATED.3IONS-SCNC: 6 MMOL/L (ref 9–17)
BUN BLDV-MCNC: 21 MG/DL (ref 6–20)
BUN/CREAT BLD: 43 (ref 9–20)
CALCIUM SERPL-MCNC: 8.7 MG/DL (ref 8.6–10.4)
CHLORIDE BLD-SCNC: 98 MMOL/L (ref 98–107)
CO2: 37 MMOL/L (ref 20–31)
CREAT SERPL-MCNC: 0.49 MG/DL (ref 0.7–1.2)
GFR AFRICAN AMERICAN: >60 ML/MIN
GFR NON-AFRICAN AMERICAN: >60 ML/MIN
GFR SERPL CREATININE-BSD FRML MDRD: ABNORMAL ML/MIN/{1.73_M2}
GFR SERPL CREATININE-BSD FRML MDRD: ABNORMAL ML/MIN/{1.73_M2}
GLUCOSE BLD-MCNC: 77 MG/DL (ref 70–99)
HCT VFR BLD CALC: 35.2 % (ref 40.7–50.3)
HEMOGLOBIN: 11.5 G/DL (ref 13–17)
MAGNESIUM: 2 MG/DL (ref 1.6–2.6)
MCH RBC QN AUTO: 32.8 PG (ref 25.2–33.5)
MCHC RBC AUTO-ENTMCNC: 32.7 G/DL (ref 28.4–34.8)
MCV RBC AUTO: 100.3 FL (ref 82.6–102.9)
NRBC AUTOMATED: 0.2 PER 100 WBC
PDW BLD-RTO: 13.1 % (ref 11.8–14.4)
PLATELET # BLD: 192 K/UL (ref 138–453)
PMV BLD AUTO: 9.5 FL (ref 8.1–13.5)
POTASSIUM SERPL-SCNC: 3.2 MMOL/L (ref 3.7–5.3)
RBC # BLD: 3.51 M/UL (ref 4.21–5.77)
SODIUM BLD-SCNC: 141 MMOL/L (ref 135–144)
WBC # BLD: 9.1 K/UL (ref 3.5–11.3)

## 2018-08-18 PROCEDURE — 80048 BASIC METABOLIC PNL TOTAL CA: CPT

## 2018-08-18 PROCEDURE — 6370000000 HC RX 637 (ALT 250 FOR IP): Performed by: FAMILY MEDICINE

## 2018-08-18 PROCEDURE — 6370000000 HC RX 637 (ALT 250 FOR IP): Performed by: INTERNAL MEDICINE

## 2018-08-18 PROCEDURE — 2580000003 HC RX 258: Performed by: INTERNAL MEDICINE

## 2018-08-18 PROCEDURE — 97110 THERAPEUTIC EXERCISES: CPT

## 2018-08-18 PROCEDURE — 6370000000 HC RX 637 (ALT 250 FOR IP): Performed by: PHYSICIAN ASSISTANT

## 2018-08-18 PROCEDURE — 6360000002 HC RX W HCPCS: Performed by: INTERNAL MEDICINE

## 2018-08-18 PROCEDURE — 36415 COLL VENOUS BLD VENIPUNCTURE: CPT

## 2018-08-18 PROCEDURE — 97116 GAIT TRAINING THERAPY: CPT

## 2018-08-18 PROCEDURE — 1200000000 HC SEMI PRIVATE

## 2018-08-18 PROCEDURE — 94664 DEMO&/EVAL PT USE INHALER: CPT

## 2018-08-18 PROCEDURE — 97535 SELF CARE MNGMENT TRAINING: CPT

## 2018-08-18 PROCEDURE — 85027 COMPLETE CBC AUTOMATED: CPT

## 2018-08-18 PROCEDURE — 94660 CPAP INITIATION&MGMT: CPT

## 2018-08-18 PROCEDURE — 94640 AIRWAY INHALATION TREATMENT: CPT

## 2018-08-18 PROCEDURE — 83735 ASSAY OF MAGNESIUM: CPT

## 2018-08-18 RX ORDER — POTASSIUM CHLORIDE 7.45 MG/ML
10 INJECTION INTRAVENOUS
Status: COMPLETED | OUTPATIENT
Start: 2018-08-18 | End: 2018-08-18

## 2018-08-18 RX ADMIN — TRAMADOL HYDROCHLORIDE 50 MG: 50 TABLET, FILM COATED ORAL at 07:29

## 2018-08-18 RX ADMIN — APIXABAN 5 MG: 5 TABLET, FILM COATED ORAL at 21:00

## 2018-08-18 RX ADMIN — APIXABAN 5 MG: 5 TABLET, FILM COATED ORAL at 08:58

## 2018-08-18 RX ADMIN — FOLIC ACID 1 MG: 1 TABLET ORAL at 08:58

## 2018-08-18 RX ADMIN — IPRATROPIUM BROMIDE AND ALBUTEROL SULFATE 1 AMPULE: .5; 3 SOLUTION RESPIRATORY (INHALATION) at 10:31

## 2018-08-18 RX ADMIN — DILTIAZEM HYDROCHLORIDE 90 MG: 60 TABLET, FILM COATED ORAL at 13:33

## 2018-08-18 RX ADMIN — LOSARTAN POTASSIUM 50 MG: 50 TABLET, FILM COATED ORAL at 08:57

## 2018-08-18 RX ADMIN — MOMETASONE FUROATE AND FORMOTEROL FUMARATE DIHYDRATE 2 PUFF: 200; 5 AEROSOL RESPIRATORY (INHALATION) at 10:31

## 2018-08-18 RX ADMIN — CETIRIZINE HYDROCHLORIDE 10 MG: 10 TABLET, FILM COATED ORAL at 08:58

## 2018-08-18 RX ADMIN — GUAIFENESIN AND DEXTROMETHORPHAN HYDROBROMIDE 1 TABLET: 600; 30 TABLET, EXTENDED RELEASE ORAL at 21:00

## 2018-08-18 RX ADMIN — MOMETASONE FUROATE AND FORMOTEROL FUMARATE DIHYDRATE 2 PUFF: 200; 5 AEROSOL RESPIRATORY (INHALATION) at 20:17

## 2018-08-18 RX ADMIN — FAMOTIDINE 20 MG: 20 TABLET ORAL at 21:00

## 2018-08-18 RX ADMIN — TRAMADOL HYDROCHLORIDE 50 MG: 50 TABLET, FILM COATED ORAL at 19:02

## 2018-08-18 RX ADMIN — GUAIFENESIN AND DEXTROMETHORPHAN HYDROBROMIDE 1 TABLET: 600; 30 TABLET, EXTENDED RELEASE ORAL at 09:00

## 2018-08-18 RX ADMIN — POTASSIUM CHLORIDE 10 MEQ: 10 INJECTION, SOLUTION INTRAVENOUS at 13:53

## 2018-08-18 RX ADMIN — POTASSIUM CHLORIDE 10 MEQ: 10 INJECTION, SOLUTION INTRAVENOUS at 12:35

## 2018-08-18 RX ADMIN — IPRATROPIUM BROMIDE AND ALBUTEROL SULFATE 1 AMPULE: .5; 3 SOLUTION RESPIRATORY (INHALATION) at 20:16

## 2018-08-18 RX ADMIN — ALPRAZOLAM 0.5 MG: 0.5 TABLET ORAL at 23:22

## 2018-08-18 RX ADMIN — PREDNISONE 40 MG: 20 TABLET ORAL at 08:57

## 2018-08-18 RX ADMIN — ALPRAZOLAM 0.5 MG: 0.5 TABLET ORAL at 05:55

## 2018-08-18 RX ADMIN — FAMOTIDINE 20 MG: 20 TABLET ORAL at 08:58

## 2018-08-18 RX ADMIN — DILTIAZEM HYDROCHLORIDE 90 MG: 60 TABLET, FILM COATED ORAL at 21:00

## 2018-08-18 RX ADMIN — THIAMINE HCL (VITAMIN B1) 50 MG TABLET 100 MG: at 08:57

## 2018-08-18 RX ADMIN — THERA TABS 1 TABLET: TAB at 08:57

## 2018-08-18 RX ADMIN — Medication 10 ML: at 09:00

## 2018-08-18 RX ADMIN — Medication 10 ML: at 21:05

## 2018-08-18 RX ADMIN — IPRATROPIUM BROMIDE AND ALBUTEROL SULFATE 1 AMPULE: .5; 3 SOLUTION RESPIRATORY (INHALATION) at 05:49

## 2018-08-18 RX ADMIN — CITALOPRAM 10 MG: 10 TABLET, FILM COATED ORAL at 08:57

## 2018-08-18 RX ADMIN — MONTELUKAST SODIUM 10 MG: 10 TABLET, FILM COATED ORAL at 08:58

## 2018-08-18 RX ADMIN — POTASSIUM CHLORIDE 10 MEQ: 10 INJECTION, SOLUTION INTRAVENOUS at 15:15

## 2018-08-18 RX ADMIN — POTASSIUM CHLORIDE 10 MEQ: 10 INJECTION, SOLUTION INTRAVENOUS at 11:33

## 2018-08-18 RX ADMIN — DILTIAZEM HYDROCHLORIDE 90 MG: 60 TABLET, FILM COATED ORAL at 05:45

## 2018-08-18 ASSESSMENT — PAIN SCALES - GENERAL
PAINLEVEL_OUTOF10: 0
PAINLEVEL_OUTOF10: 8
PAINLEVEL_OUTOF10: 7
PAINLEVEL_OUTOF10: 7
PAINLEVEL_OUTOF10: 4

## 2018-08-18 ASSESSMENT — PAIN DESCRIPTION - ORIENTATION: ORIENTATION: LEFT

## 2018-08-18 ASSESSMENT — PAIN DESCRIPTION - PAIN TYPE: TYPE: ACUTE PAIN

## 2018-08-18 ASSESSMENT — PAIN DESCRIPTION - ONSET: ONSET: SUDDEN

## 2018-08-18 ASSESSMENT — PAIN DESCRIPTION - LOCATION: LOCATION: RIB CAGE

## 2018-08-18 ASSESSMENT — PAIN DESCRIPTION - FREQUENCY: FREQUENCY: INTERMITTENT

## 2018-08-18 ASSESSMENT — PAIN DESCRIPTION - DESCRIPTORS: DESCRIPTORS: SHARP

## 2018-08-18 ASSESSMENT — PAIN DESCRIPTION - PROGRESSION: CLINICAL_PROGRESSION: GRADUALLY WORSENING

## 2018-08-18 NOTE — PLAN OF CARE
Problem: Activity Intolerance:  Goal: Ability to tolerate increased activity will improve  Ability to tolerate increased activity will improve   Outcome: Ongoing  Patient is short of breath with activity. Patient continues on 2L oxygen and Bipap at night. Sao2 at 95 % on 2L. Will continue to monitor.

## 2018-08-18 NOTE — PROGRESS NOTES
RESPIRATORY ASSESSMENT PROTOCOL                                                                                              Patient Name: Edison Console Room#: O963/V727-81 : 1965     Admitting diagnosis: Acute on chronic respiratory failure (HCC) [J96.20]  Acute on chronic respiratory failure ([facility] [J96.20]       Medical History:   Past Medical History:   Diagnosis Date    Arthritis     COPD (chronic obstructive pulmonary disease) ([facility]     Hypertension     Scoliosis        PATIENT ASSESSMENT    LABORATORY DATA  Hematology:   Lab Results   Component Value Date    WBC 7.4 2018    RBC 3.51 2018    HGB 11.8 2018    HCT 34.9 2018     2018     Chemistry:    Lab Results   Component Value Date    PHART 7.431 2018    UOL3HVB 43.6 2018    PO2ART 70.7 2018    X0TPRHTH 94.6 2018    PIE8BJJ 28.4 2018    PBEA 3.5 2018       Blood Culture:   Sputum Culture:     VITALS  Pulse: 65   Resp: 18  BP: 108/78  SpO2: 98 % O2 Device: Bi-PAP  Temp: 97.8 °F (36.6 °C)  Comment:     SKIN COLOR  [x] Normal  [] Pale  [] Dusky  [] Cyanotic      RESPIRATORY PATTERN  [] Normal  [x] Dyspnea  [] Cheyne-Lawrence  [] Kussmaul  [] Biots    AMBULATORY  [] Yes  [] No  [x] With Assistance    PEAK FLOW  Predicted:     Personal Best:        VITAL CAPACITY  Predicted value:  ml  Actual Value:  ml  30% of Predicted:  Ml    Patient Acuity 0 1 2 3 4 Score   Level of Concious (LOC) [x]  Alert & Oriented or Pt normal LOC []  Confused;follows directions []  Confused & uncooper-ative []  Obtunded []  Comatose 0   Respiratory Rate  (RR) []  Reg. rate & pattern. 12 - 20 bpm  []  Increased RR. Greater than 20 bpm   []  SOB w/ exertion or RR greater than 24 bpm []  Access- ory muscle use at rest. Abn.  resp.  [x]  SOB at rest.   4   Bilateral Breath Sounds (BBS) []  Clear []  Diminish-ed bases  [x]  Diminish-ed t/o, or rales   []  Sporadic, scattered wheezes or rhonchi []  Persistentwheezes and, or absent BBS 2   Cough [x]  Strong, effective, & non-prod. []  Effective & prod. Less than 25 ml (2 TBSP) over past 24 hrs []  Ineffective & non-prod to less than 25 ML over past 24 hrs []  Ineffective and, or greater than 25 ml sputum prod. past 24 hrs. []  Nonspon- taneous; Requires suctioning 0   Pulmonary History  (PULM HX) []  No smoking and no chronic pulmonaryhistory []  Former smoker. Quit over 12 mos. ago []  Current smoker or quit w/ in 12 mos []  Pulm. History and, or 20 pk/yr smoking hx [x]  Admitted w/ acute pulm. dx and, or has been admitted w/ pulm. dx 2 or more times over past 12 mos 4   Surgical History this Admit  (SURG HX) [x]  No surgery []  General surgery []  Lower abdominal []  Thoracic or upper abdominal   []  Thoracic w/ pulm. disease 0   Chest X-Ray (CXR)/CT Scan [x]  Clear or not applicable []  Not available []  Atelect- asis or pleural effusions []  Localized infiltrate or pulm. edema []  Con-solidated Infiltrates, bilateral, or in more than 1 lobe 0   Slow or Forced VC, FEV1 OR PEFR (PULM FXN)  [x]  80% or greater, or not indicated []  Pt. unable to perform []  FEV1 or PEFR or VC 51-79%. []  FEV1 or PEFR or VC  30-49%   []  FEV1 or PEFR or VC less than 30%   0   TOTAL ACUITY: 10       CARE PLAN    If Acuity Level is 2, 3, or 4 in any of the following:    [x] BILATERAL BREATH SOUNDS (BBS)     [x] PULMONARY HISTORY (PULM HX)  [] PULMONARY FUNCTION (PULM FX)    Goal: Improve respiratory functions in patients with airway disease and decrease WOB    [] AEROSOL PROTOCOL    Total Acuity:   16-32  []  Secondary Assessment in 24 hrs Total Acuity:  9-15  [x]  Secondary Assessment in 24 hrs Total Acuity:  4-8  []  Secondary Assessment in 48 hrs Total Acuity:  0-3  []  Secondary Assessment in 72 hrs   HHN AEROSOL THERAPY with  [physician-ordered bronchodilator(s)] q 4 & Albuterol PRN q2 hrs. Breath-Actuated Neb if BBS Acuity = 4, and pt. can use MP.  Notify physician if condition deteriorates. HHN AEROSOL THERAPY with  [physician-ordered bronchodilator(s)]  QID and Albuterol PRN q4 hrs. Breath-Actuated Neb if BBS Acuity = 4, and pt. can use MP. Notify physician if condition deteriorates. MDI THERAPY with  2 actuations of [physician-ordered bronchodilator(s)] via spacer TID Albuterol and PRNq4 hrs. If unable to utilize MDI: HHN [physician-ordered bronchodilator(s)] TID and Albuterol PRN q4 hrs. Notify physician if condition deteriorates. MDI THERAPY with  [physician-ordered bronchodilator(s)] via spacer TID PRN. If unable to utilize MDI: HHN [physician-ordered bronchodilator(s)] TID PRN. Notify physician if condition deteriorates. If Acuity Level is 2, 3, or 4 in any of the following:    [] COUGH     [] SURGICAL HISTORY (SURG HX)  [] CHEST XRAY (CXR)    Goal: Improvement in sputum mobilization in patients with ineffective airway clearance. Reverse atelectasis. [] Bronchopulmonary Hygiene Protocol    Total Acuity:   16-32  []  Secondary Assessment in 24 hrs Total Acuity:  9-15  []  Secondary Assessment in 24 hrs Total Acuity:  4-8  []  Secondary Assessment in 48 hrs Total Acuity:  0-3  []  Secondary Assessment in 72 hrs   METANEB QID with [physician-ordered bronchodilator(s)] if CXR Acuity = 4; otherwise:  PD&P, PEP, or Vest QID & PRN  NT Sxn PRN for ineffective cough  METANEB QID with [physician-ordered bronchodilator(s)] if CXR Acuity = 4; otherwise:  PD&P, PEP, or Vest TID & PRN  NT Sxn PRN for ineffective cough  Instruct patient to self-perform IS q1hr WA   Directed Cough self-performed q1hr WA     If Acuity Level is 2 or above in the following:    [] PULMONARY HISTORY (PULM HX)    Goal: Assist patient in quitting smoking to slow or stop the progression of lung disease.     [] Smoking Cessation Protocol    SMOKING CESSATION EDUCATION provided according to policy XV_823: (delgado with an X)  ____Yes    ____ No     ____ NA    Smoking Cessation Booklet

## 2018-08-18 NOTE — PROGRESS NOTES
Occupational Therapy  Facility/Department: [facility] AT THE [facility]  Daily Treatment Note  NAME: Danielle Waite  : 1965  MRN: [id number]    Date of Service: 2018    Discharge Recommendations:  Continue to assess pending progress       Patient Diagnosis(es): The primary encounter diagnosis was Acute respiratory failure with hypercapnia ([address] Diagnoses of Anxiety, Left-sided chest wall pain, and History of COPD were also pertinent to this visit. has a past medical history of Arthritis; COPD (chronic obstructive pulmonary disease) ([address] Hypertension; and Scoliosis. has a past surgical history that includes Tonsillectomy; other surgical history (Left, 2017); and Facial reconstruction surgery (Left, 2017). Restrictions  Restrictions/Precautions  Restrictions/Precautions: General Precautions, Fall Risk  Subjective   General  Chart Reviewed: Yes  Patient assessed for rehabilitation services?: Yes  Response to previous treatment: Patient with no complaints from previous session  Family / Caregiver Present: Yes  Subjective  Subjective: Pt agreed to tx, 7/10 pain reported. General Comment  Comments: Increased \"shakiness\" as tx progressed. Pre Treatment Pain Screening  Intervention List: Patient able to continue with treatment  Comments / Details: Pt just states he is \"uncomfortable\" but not in pain  Pain Assessment  Patient Currently in Pain: No  Pain Assessment: 0-10  Vital Signs  Patient Currently in Pain: No   Orientation     Objective    ADL  UE Bathing: Stand by assistance  Additional Comments: seated EOB           Bed mobility  Supine to Sit: Modified independent  Sit to Supine: Modified independent  Comment: with use of bedrails                                                                    Assessment      Safety Devices  Safety Devices in place: Yes  Type of devices:  All fall risk precautions in place  Restraints  Initially in place: Yes          Plan   Plan  Times per day: Daily  Current Treatment Recommendations: Strengthening, Balance Training, Functional Mobility Training, Endurance Training, Safety Education & Training, Self-Care / ADL, Patient/Caregiver Education & Training, Equipment Evaluation, Education, & procurement  G-Code     OutComes Score                                           AM-PAC Score             Goals  Short term goals  Time Frame for Short term goals: 3-5 days  Short term goal 1: Patient to state 5 EC/WS techniques to implement throughout I/ADL tasks. Short term goal 2: Patient to complete ADL routine while implementing EC/WS techniques and/or AE/DME to improve ADL independence and tolerance. Short term goal 3: Patient to engage in 15 min of ther ex/ther act with rest breaks PRN to improve UE/UB strength and activity tolerance.        Therapy Time   Individual Concurrent Group Co-treatment   Time In 0957         Time Out 1030         Minutes [address] [place] [address]

## 2018-08-18 NOTE — PLAN OF CARE
Problem: Falls - Risk of:  Goal: Will remain free from falls  Will remain free from falls   Outcome: Ongoing  Patient is alert and oriented and has demonstrated the use of using the call light for assistance before getting up. Education has been provided to defer the use of the bed alarm and/or restraint free alarm as the patient has shown competency in calling for assistance and verbalizing the risk.

## 2018-08-18 NOTE — PLAN OF CARE
Problem: Falls - Risk of:  Goal: Will remain free from falls  Will remain free from falls   Outcome: Met This Shift  Patient remains free from falls this shift. Gait slight unsteady. Fall precautions in place. Problem: Activity Intolerance:  Goal: Ability to tolerate increased activity will improve  Ability to tolerate increased activity will improve   Outcome: Met This Shift  Patient continues with shortness of breath with activity. Educated patient to space activities throughout the day. Verbalizes understanding. Problem: Breathing Pattern - Ineffective:  Goal: Ability to achieve and maintain a regular respiratory rate will improve  Ability to achieve and maintain a regular respiratory rate will improve   Outcome: Ongoing  Respirations unlabored at rest.  Tachypnea noted with exertion.   Oxygen saturation >94%

## 2018-08-18 NOTE — PROGRESS NOTES
days  ABD:    Bowels sounds normal.  Abdomen is soft. No distention. No tenderness. EXT:   Trace edema bilaterally . No calf tenderness. NEURO: Motor and sensory are intact  SKIN:  No rashes. No skin lesions. LINES: antecubital IV in place, no signs of infection  -----------------------------------------------------------------  Diagnostic Data:    · All lines, drips, IV sites and medications reviewed  · All available data reviewed  Lab Results   Component Value Date    WBC 9.1 08/18/2018    HGB 11.5 (L) 08/18/2018    .3 08/18/2018     08/18/2018     Lab Results   Component Value Date    SEGS 92 (H) 08/14/2018    LYMPHOPCT 7 (L) 08/14/2018    MONOPCT 1 (L) 08/14/2018    EOSRELPCT 0 (L) 08/14/2018    BASOPCT 0 08/14/2018    NEUTROABS 7.81 08/14/2018    Lab Results   Component Value Date    GLUCOSE 77 08/18/2018    BUN 21 (H) 08/18/2018    CREATININE 0.49 (L) 08/18/2018     08/18/2018    K 3.2 (L) 08/18/2018    CALCIUM 8.7 08/18/2018    CL 98 08/18/2018    CO2 37 (H) 08/18/2018     Lab Results   Component Value Date    LACTA 1.0 08/09/2018       PROBLEM LIST:  Principal Problem:    Acute on chronic respiratory failure (HCC)  Active Problems:    COPD exacerbation (HCC)    COPD, severe (HCC)    Uncontrolled hypertension    Dyslipidemia    Severe malnutrition (HCC)    Chronic respiratory failure with hypoxia (HCC)    Tobacco abuse    Paroxysmal atrial fibrillation (HCC)    Atrial fibrillation with RVR (HCC)    Non-cardiac chest pain    Severe anxiety    ETOH abuse    Closed fracture of one rib of left side  Resolved Problems:    * No resolved hospital problems. *      ASSESSMENT / PLAN:  · Acute Exacerbation of COPD with CO2 retention and respiratory acidosis  ? Acidosis and CO2 retention resolved   ? Still hypoxic  ? Continue supplemental O2  ? Continue bronchodilators  ?  IV solumedrol DC'd by Dr. Pamela Murillo - will taper off PO steroids q3 days  · Acute on chronic respiratory failure, home

## 2018-08-19 LAB
ALLEN TEST: ABNORMAL
ANION GAP SERPL CALCULATED.3IONS-SCNC: 5 MMOL/L (ref 9–17)
BUN BLDV-MCNC: 18 MG/DL (ref 6–20)
BUN/CREAT BLD: 32 (ref 9–20)
CALCIUM SERPL-MCNC: 8.2 MG/DL (ref 8.6–10.4)
CARBOXYHEMOGLOBIN: ABNORMAL % (ref 0–5)
CHLORIDE BLD-SCNC: 97 MMOL/L (ref 98–107)
CO2: 37 MMOL/L (ref 20–31)
CREAT SERPL-MCNC: 0.57 MG/DL (ref 0.7–1.2)
FIO2: ABNORMAL
GFR AFRICAN AMERICAN: >60 ML/MIN
GFR NON-AFRICAN AMERICAN: >60 ML/MIN
GFR SERPL CREATININE-BSD FRML MDRD: ABNORMAL ML/MIN/{1.73_M2}
GFR SERPL CREATININE-BSD FRML MDRD: ABNORMAL ML/MIN/{1.73_M2}
GLUCOSE BLD-MCNC: 78 MG/DL (ref 70–99)
HCO3 ARTERIAL: 34.5 MMOL/L (ref 22–26)
HCT VFR BLD CALC: 35.7 % (ref 40.7–50.3)
HEMOGLOBIN: 12.1 G/DL (ref 13–17)
MCH RBC QN AUTO: 33.3 PG (ref 25.2–33.5)
MCHC RBC AUTO-ENTMCNC: 33.9 G/DL (ref 28.4–34.8)
MCV RBC AUTO: 98.3 FL (ref 82.6–102.9)
METHEMOGLOBIN: ABNORMAL % (ref 0–1.9)
MODE: ABNORMAL
NEGATIVE BASE EXCESS, ART: ABNORMAL MMOL/L (ref 0–2)
NOTIFICATION TIME: ABNORMAL
NOTIFICATION: ABNORMAL
NRBC AUTOMATED: 0 PER 100 WBC
O2 DEVICE/FLOW/%: ABNORMAL
O2 SAT, ARTERIAL: 92.6 % (ref 95–98)
OXYHEMOGLOBIN: ABNORMAL % (ref 95–98)
PATIENT TEMP: 37
PCO2 ARTERIAL: 49 MMHG (ref 35–45)
PCO2, ART, TEMP ADJ: ABNORMAL
PDW BLD-RTO: 13.1 % (ref 11.8–14.4)
PEEP/CPAP: ABNORMAL
PH ARTERIAL: 7.47 (ref 7.35–7.45)
PH, ART, TEMP ADJ: ABNORMAL (ref 7.35–7.45)
PLATELET # BLD: 160 K/UL (ref 138–453)
PMV BLD AUTO: 9 FL (ref 8.1–13.5)
PO2 ARTERIAL: 61.5 MMHG (ref 80–100)
PO2, ART, TEMP ADJ: ABNORMAL MMHG (ref 80–100)
POSITIVE BASE EXCESS, ART: 9.2 MMOL/L (ref 0–2)
POTASSIUM SERPL-SCNC: 3.6 MMOL/L (ref 3.7–5.3)
PSV: ABNORMAL
PT. POSITION: ABNORMAL
RBC # BLD: 3.63 M/UL (ref 4.21–5.77)
RESPIRATORY RATE: 20
SAMPLE SITE: ABNORMAL
SET RATE: ABNORMAL
SODIUM BLD-SCNC: 139 MMOL/L (ref 135–144)
TEXT FOR RESPIRATORY: ABNORMAL
TOTAL HB: ABNORMAL G/DL (ref 12–16)
TOTAL RATE: ABNORMAL
VT: ABNORMAL
WBC # BLD: 8.2 K/UL (ref 3.5–11.3)

## 2018-08-19 PROCEDURE — 94664 DEMO&/EVAL PT USE INHALER: CPT

## 2018-08-19 PROCEDURE — 1200000000 HC SEMI PRIVATE

## 2018-08-19 PROCEDURE — 6370000000 HC RX 637 (ALT 250 FOR IP): Performed by: FAMILY MEDICINE

## 2018-08-19 PROCEDURE — 36415 COLL VENOUS BLD VENIPUNCTURE: CPT

## 2018-08-19 PROCEDURE — 6370000000 HC RX 637 (ALT 250 FOR IP): Performed by: INTERNAL MEDICINE

## 2018-08-19 PROCEDURE — 94640 AIRWAY INHALATION TREATMENT: CPT

## 2018-08-19 PROCEDURE — 36600 WITHDRAWAL OF ARTERIAL BLOOD: CPT

## 2018-08-19 PROCEDURE — 85027 COMPLETE CBC AUTOMATED: CPT

## 2018-08-19 PROCEDURE — 80048 BASIC METABOLIC PNL TOTAL CA: CPT

## 2018-08-19 PROCEDURE — 94660 CPAP INITIATION&MGMT: CPT

## 2018-08-19 PROCEDURE — 82805 BLOOD GASES W/O2 SATURATION: CPT

## 2018-08-19 PROCEDURE — 2580000003 HC RX 258: Performed by: INTERNAL MEDICINE

## 2018-08-19 PROCEDURE — 97110 THERAPEUTIC EXERCISES: CPT

## 2018-08-19 PROCEDURE — 6370000000 HC RX 637 (ALT 250 FOR IP): Performed by: PHYSICIAN ASSISTANT

## 2018-08-19 PROCEDURE — 97116 GAIT TRAINING THERAPY: CPT

## 2018-08-19 RX ADMIN — FAMOTIDINE 20 MG: 20 TABLET ORAL at 08:01

## 2018-08-19 RX ADMIN — MONTELUKAST SODIUM 10 MG: 10 TABLET, FILM COATED ORAL at 08:01

## 2018-08-19 RX ADMIN — DILTIAZEM HYDROCHLORIDE 90 MG: 60 TABLET, FILM COATED ORAL at 04:47

## 2018-08-19 RX ADMIN — DILTIAZEM HYDROCHLORIDE 90 MG: 60 TABLET, FILM COATED ORAL at 22:46

## 2018-08-19 RX ADMIN — PREDNISONE 40 MG: 20 TABLET ORAL at 08:02

## 2018-08-19 RX ADMIN — IPRATROPIUM BROMIDE AND ALBUTEROL SULFATE 1 AMPULE: .5; 3 SOLUTION RESPIRATORY (INHALATION) at 21:55

## 2018-08-19 RX ADMIN — IPRATROPIUM BROMIDE AND ALBUTEROL SULFATE 1 AMPULE: .5; 3 SOLUTION RESPIRATORY (INHALATION) at 10:22

## 2018-08-19 RX ADMIN — CETIRIZINE HYDROCHLORIDE 10 MG: 10 TABLET, FILM COATED ORAL at 08:01

## 2018-08-19 RX ADMIN — LOSARTAN POTASSIUM 50 MG: 50 TABLET, FILM COATED ORAL at 08:02

## 2018-08-19 RX ADMIN — MOMETASONE FUROATE AND FORMOTEROL FUMARATE DIHYDRATE 2 PUFF: 200; 5 AEROSOL RESPIRATORY (INHALATION) at 21:55

## 2018-08-19 RX ADMIN — TRAMADOL HYDROCHLORIDE 50 MG: 50 TABLET, FILM COATED ORAL at 20:44

## 2018-08-19 RX ADMIN — GUAIFENESIN AND DEXTROMETHORPHAN HYDROBROMIDE 1 TABLET: 600; 30 TABLET, EXTENDED RELEASE ORAL at 20:44

## 2018-08-19 RX ADMIN — FAMOTIDINE 20 MG: 20 TABLET ORAL at 20:44

## 2018-08-19 RX ADMIN — APIXABAN 5 MG: 5 TABLET, FILM COATED ORAL at 20:44

## 2018-08-19 RX ADMIN — CITALOPRAM 10 MG: 10 TABLET, FILM COATED ORAL at 08:01

## 2018-08-19 RX ADMIN — APIXABAN 5 MG: 5 TABLET, FILM COATED ORAL at 08:01

## 2018-08-19 RX ADMIN — ALPRAZOLAM 0.5 MG: 0.5 TABLET ORAL at 04:37

## 2018-08-19 RX ADMIN — IPRATROPIUM BROMIDE AND ALBUTEROL SULFATE 1 AMPULE: .5; 3 SOLUTION RESPIRATORY (INHALATION) at 16:52

## 2018-08-19 RX ADMIN — ALPRAZOLAM 0.5 MG: 0.5 TABLET ORAL at 17:08

## 2018-08-19 RX ADMIN — THERA TABS 1 TABLET: TAB at 08:03

## 2018-08-19 RX ADMIN — GUAIFENESIN AND DEXTROMETHORPHAN HYDROBROMIDE 1 TABLET: 600; 30 TABLET, EXTENDED RELEASE ORAL at 08:02

## 2018-08-19 RX ADMIN — Medication 10 ML: at 20:44

## 2018-08-19 RX ADMIN — ALPRAZOLAM 0.5 MG: 0.5 TABLET ORAL at 22:05

## 2018-08-19 RX ADMIN — MOMETASONE FUROATE AND FORMOTEROL FUMARATE DIHYDRATE 2 PUFF: 200; 5 AEROSOL RESPIRATORY (INHALATION) at 10:22

## 2018-08-19 RX ADMIN — THIAMINE HCL (VITAMIN B1) 50 MG TABLET 100 MG: at 08:01

## 2018-08-19 RX ADMIN — Medication 10 ML: at 08:04

## 2018-08-19 RX ADMIN — DILTIAZEM HYDROCHLORIDE 90 MG: 60 TABLET, FILM COATED ORAL at 13:15

## 2018-08-19 RX ADMIN — IPRATROPIUM BROMIDE AND ALBUTEROL SULFATE 1 AMPULE: .5; 3 SOLUTION RESPIRATORY (INHALATION) at 06:05

## 2018-08-19 RX ADMIN — FOLIC ACID 1 MG: 1 TABLET ORAL at 08:02

## 2018-08-19 ASSESSMENT — PAIN DESCRIPTION - ORIENTATION
ORIENTATION: RIGHT;LEFT
ORIENTATION: LEFT

## 2018-08-19 ASSESSMENT — PAIN DESCRIPTION - LOCATION
LOCATION: LEG;RIB CAGE
LOCATION: RIB CAGE

## 2018-08-19 ASSESSMENT — PAIN DESCRIPTION - DESCRIPTORS: DESCRIPTORS: THROBBING

## 2018-08-19 ASSESSMENT — PAIN SCALES - GENERAL
PAINLEVEL_OUTOF10: 5
PAINLEVEL_OUTOF10: 7
PAINLEVEL_OUTOF10: 8

## 2018-08-19 ASSESSMENT — PAIN DESCRIPTION - PAIN TYPE
TYPE: ACUTE PAIN
TYPE: CHRONIC PAIN;ACUTE PAIN

## 2018-08-19 ASSESSMENT — PAIN DESCRIPTION - FREQUENCY: FREQUENCY: INTERMITTENT

## 2018-08-19 NOTE — PROGRESS NOTES
Occupational Therapy  Facility/Department: [facility] AT THE [facility] MED SURG  Daily Treatment Note  NAME: Primitivo Bullock  : 1965  MRN: [id number]    Date of Service: 2018    Discharge Recommendations:  Continue to assess pending progress       Patient Diagnosis(es): The primary encounter diagnosis was Acute respiratory failure with hypercapnia ([facility] [address] Diagnoses of Anxiety, Left-sided chest wall pain, and History of COPD were also pertinent to this visit. has a past medical history of Arthritis; COPD (chronic obstructive pulmonary disease) ([address] Hypertension; and Scoliosis. has a past surgical history that includes Tonsillectomy; other surgical history (Left, 2017); and Facial reconstruction surgery (Left, 2017). Restrictions  Restrictions/Precautions  Restrictions/Precautions: General Precautions, Fall Risk  Subjective   General  Chart Reviewed: Yes  Patient assessed for rehabilitation services?: Yes  Response to previous treatment: Patient reporting fatigue but able to participate  Family / Caregiver Present: No  Subjective  Subjective: Pt agreed to tx, 7/10 pain reported. General Comment  Comments: Increased \"shakiness\" as tx progressed. Pre Treatment Pain Screening  Intervention List: Patient able to continue with treatment  Comments / Details: Pt unable to perform TE on left side due to severe rib pain. Pain Assessment  Patient Currently in Pain: Yes  Pain Level: 8  Pain Type: Chronic pain;Acute pain  Pain Location: Leg;Rib cage  Pain Orientation: Right;Left  Pain Frequency: Intermittent  Vital Signs  Patient Currently in Pain: Yes   Orientation     Objective    ADL  Additional Comments: Pt educated on discharge folder. Pt verbalized understanding. Exercises  Other: R UE ther ex with 3# weight 15 reps x 1 set. Pt unable to perform L UE ther ex due to 8/10 pain in rib cage. Pt became SOB and fatigued and asked to \"quit for today. \"

## 2018-08-19 NOTE — PLAN OF CARE
Problem: Falls - Risk of:  Goal: Will remain free from falls  Will remain free from falls   Outcome: Ongoing  Pt's bed in lowest position, wheels locked, bed alarm in place. Call light and bedside table within reach. Pt's room remains free of clutter. Pt using call light appropriately. Will continue to monitor. Problem: Activity Intolerance:  Goal: Ability to tolerate increased activity will improve  Ability to tolerate increased activity will improve   Outcome: Ongoing  Pt continues to have dyspnea with exertion. Rest periods provided. Pt ambulating without assistive devices, returns to baseline after sitting for a few minutes after ambulating. Problem: Breathing Pattern - Ineffective:  Goal: Ability to achieve and maintain a regular respiratory rate will improve  Ability to achieve and maintain a regular respiratory rate will improve   Outcome: Ongoing  Pt's respirations assessed, see flow sheet. Problem: Gas Exchange - Impaired:  Goal: Levels of oxygenation will improve  Levels of oxygenation will improve   Outcome: Ongoing  Pt is maintaining SPO2 above 90% with oxygen at 2 liters per minute via nasal cannula.

## 2018-08-19 NOTE — PROGRESS NOTES
Kristen Soliz M.D. Internal Medicine Progress Note 8/19/18    SUBJECTIVE:    Patient seen for f/u of Acute on chronic respiratory failure ([address] He wore BiPAP last night. Had an anxiety attack last night. Feeling better now. Has been working with PT and ambulating in room. ROS:   Constitutional: negative  for fevers, and negative for chills. Respiratory: positive for shortness of breath, positive for cough, and positive for wheezing  Cardiovascular: negative for chest pain, and negative for palpitations  Gastrointestinal: negative for abdominal pain, negative for nausea,negative for vomiting, negative for diarrhea, and negative for constipation     All other systems were reviewed with the patient and are negative unless otherwise stated in HPI    OBJECTIVE:  Vitals:   Temp: 97.7 °F (36.5 °C)  BP: 96/60  Resp: 18  Pulse: 80  SpO2: 96 %    24HR INTAKE/OUTPUT:    Intake/Output Summary (Last 24 hours) at 08/19/18 1242  Last data filed at 08/19/18 0710   Gross per 24 hour   Intake              460 ml   Output              350 ml   Net              110 ml     -----------------------------------------------------------------  Exam:  GEN:    Awake, alert and oriented x 3. no acute distress  EYES:  EOMI, pupils equal   NECK: Supple. No lymphadenopathy. No carotid bruit  CVS:    RRR, no murmur, rub or gallop  PULM:  diminished still with minimal exp wheezing. ABD:    Bowels sounds normal.  Abdomen is soft. No distention. No tenderness. EXT:   trace edema bilaterally . No calf tenderness. NEURO: Motor and sensory are intact  SKIN:  No rashes.   No skin lesions.    -----------------------------------------------------------------  Diagnostic Data:    · All available data reviewed  Lab Results   Component Value Date    WBC 8.2 08/19/2018    HGB 12.1 (L) 08/19/2018    MCV 98.3 08/19/2018     08/19/2018    Lab Results   Component Value Date    GLUCOSE 78 08/19/2018    BUN 18 08/19/2018    CREATININE 0.57 (L) 08/19/2018     08/19/2018    K 3.6 (L) 08/19/2018    CALCIUM 8.2 (L) 08/19/2018    CL 97 (L) 08/19/2018    CO2 37 (H) 08/19/2018       ASSESSMENT / PLAN:  · Acute Exacerbation of COPD with CO2 retention and respiratory acidosis  ? Acidosis and CO2 retention resolved   ? Still hypoxic  ? Continue supplemental O2  ? Continue bronchodilators  ? Tapering down on PO steroids q3 days  · Acute on chronic respiratory failure, home oxygen dependant  ? O2 with BiPAP at night  ? CT chest showed 8th rib fracture  ? pulm consult appreciated  · Paroxysmal atrial fibrillation with RVR  ? Currently sinus rhythm, HR in 60's - 80's  ? On Eliquis for stroke prevention  ? Echo looks OK  · Hypokalemia  ? Will replace K+ per protocol  · Hypertension   ? well controlled  · Chronic tobacco abuse  ? Counseled on smoking cessation  ? Nicotine patch in place  · Chronic alcholol abuse  ? Last drink was 3 weeks ago   ? No signs of DT's  · Anxiety  ? citalopram 10 mg po QD initiated  ?  Per Dr. Adame  rec's pt changed from Ativan to Xanax prn  · Nutrition status: moderate-severe protein calorie malnutrition  · DVT prophylaxis: Eliquis  · High risk medications: none    Rene Arce M.D.  8/19/2018  12:42 PM

## 2018-08-19 NOTE — PROGRESS NOTES
RESPIRATORY ASSESSMENT PROTOCOL                                                                                              Patient Name: Mikel Molina Room#: [id number] : 1965     Admitting diagnosis: Acute on chronic respiratory failure ([facility]) [J96.20]  Acute on chronic respiratory failure ([facility] [J96.20]       Medical History:   Past Medical History:   Diagnosis Date    Arthritis     COPD (chronic obstructive pulmonary disease) ([facility]     Hypertension     Scoliosis        PATIENT ASSESSMENT    LABORATORY DATA  Hematology:   Lab Results   Component Value Date    WBC 9.1 2018    RBC 3.51 2018    HGB 11.5 2018    HCT 35.2 2018     2018     Chemistry:    Lab Results   Component Value Date    PHART 7.431 2018    URG8TWA 43.6 2018    PO2ART 70.7 2018    C9WVFRHM 94.6 2018    ALI2JQT 28.4 2018    PBEA 3.5 2018       Blood Culture:   Sputum Culture:     VITALS  Pulse: 90   Resp: 18  BP: 113/73  SpO2: 93 % O2 Device: Nasal cannula  Temp: 98.6 °F (37 °C)  Comment:     SKIN COLOR  [x] Normal  [] Pale  [] Dusky  [] Cyanotic      RESPIRATORY PATTERN  [] Normal  [x] Dyspnea  [] Cheyne-Lawrence  [] Kussmaul  [] Biots    AMBULATORY  [] Yes  [] No  [x] With Assistance    PEAK FLOW  Predicted:     Personal Best:        VITAL CAPACITY  Predicted value:  ml  Actual Value:  ml  30% of Predicted:  Ml    Patient Acuity 0 1 2 3 4 Score   Level of Concious (LOC) [x]  Alert & Oriented or Pt normal LOC []  Confused;follows directions []  Confused & uncooper-ative []  Obtunded []  Comatose 0   Respiratory Rate  (RR) []  Reg. rate & pattern. 12 - 20 bpm  []  Increased RR. Greater than 20 bpm   []  SOB w/ exertion or RR greater than 24 bpm []  Access- ory muscle use at rest. Abn.  resp.  [x]  SOB at rest.   4   Bilateral Breath Sounds (BBS) []  Clear []  Diminish-ed bases  [x]  Diminish-ed t/o, or rales   []  Sporadic, scattered wheezes or rhonchi []  Persistentwheezes and, or absent BBS 2   Cough [x]  Strong, effective, & non-prod. []  Effective & prod. Less than 25 ml (2 TBSP) over past 24 hrs []  Ineffective & non-prod to less than 25 ML over past 24 hrs []  Ineffective and, or greater than 25 ml sputum prod. past 24 hrs. []  Nonspon- taneous; Requires suctioning 0   Pulmonary History  (PULM HX) []  No smoking and no chronic pulmonaryhistory []  Former smoker. Quit over 12 mos. ago []  Current smoker or quit w/ in 12 mos []  Pulm. History and, or 20 pk/yr smoking hx [x]  Admitted w/ acute pulm. dx and, or has been admitted w/ pulm. dx 2 or more times over past 12 mos 4   Surgical History this Admit  (SURG HX) [x]  No surgery []  General surgery []  Lower abdominal []  Thoracic or upper abdominal   []  Thoracic w/ pulm. disease 0   Chest X-Ray (CXR)/CT Scan [x]  Clear or not applicable []  Not available []  Atelect- asis or pleural effusions []  Localized infiltrate or pulm. edema []  Con-solidated Infiltrates, bilateral, or in more than 1 lobe 0   Slow or Forced VC, FEV1 OR PEFR (PULM FXN)  [x]  80% or greater, or not indicated []  Pt. unable to perform []  FEV1 or PEFR or VC 51-79%. []  FEV1 or PEFR or VC  30-49%   []  FEV1 or PEFR or VC less than 30%   0   TOTAL ACUITY: 10       CARE PLAN    If Acuity Level is 2, 3, or 4 in any of the following:    [x] BILATERAL BREATH SOUNDS (BBS)     [x] PULMONARY HISTORY (PULM HX)  [] PULMONARY FUNCTION (PULM FX)    Goal: Improve respiratory functions in patients with airway disease and decrease WOB    [x] AEROSOL PROTOCOL    Total Acuity:   16-32  []  Secondary Assessment in 24 hrs Total Acuity:  9-15  [x]  Secondary Assessment in 24 hrs Total Acuity:  4-8  []  Secondary Assessment in 48 hrs Total Acuity:  0-3  []  Secondary Assessment in 72 hrs   HHN AEROSOL THERAPY with  [physician-ordered bronchodilator(s)] q 4 & Albuterol PRN q2 hrs. Breath-Actuated Neb if BBS Acuity = 4, and pt. can use MP.  Notify physician given:  ____Yes  ____No ____Patient Danelle Mueller

## 2018-08-19 NOTE — PROGRESS NOTES
amb and on proper breathing technique. Pt with fair to good understanding. REQUIRES PT FOLLOW UP: Yes  Activity Tolerance  Activity Tolerance: Patient Tolerated treatment well;Patient limited by endurance     G-Code     OutComes Score    AM-PAC Score    Goals  Short term goals  Time Frame for Short term goals: 14 visits   Short term goal 1: Patient will demonstrate the ability to ambulate 50ftx1 with LRAD with SBAx1 in order to ease ADLS   Short term goal 2: Patient will demonstrate the ability to complete bed mobility and transfers with SBAx1 in order to ease functional mobility   Short term goal 3: Patient will demonstrate the ability to ascend/descend 5 steps with use of HR and supervision assistance in order to safely get in/out of the home   Short term goal 4: Patient will tolerate 35-45' ther-ex in order to increase endurance and ease ADLs     Plan    Plan  Times per week: 7x/week   Times per day: Twice a day (1x/day on weekends)  Current Treatment Recommendations: Strengthening, Balance Training, Functional Mobility Training, Transfer Training, Endurance Training, Gait Training, Stair training, Neuromuscular Re-education, Pain Management, Home Exercise Program, Safety Education & Training, Patient/Caregiver Education & Training, Equipment Evaluation, Education, & procurement  Safety Devices  Type of devices:  All fall risk precautions in place, Call light within reach, Nurse notified, Left in chair (Nursing [facility], RN) to place an RFA on pt. )     Therapy Time   Individual Concurrent Group Co-treatment   Time In 0720         Time Out 0750         Minutes Dinah [address]

## 2018-08-20 VITALS
TEMPERATURE: 98.8 F | HEIGHT: 66 IN | RESPIRATION RATE: 18 BRPM | HEART RATE: 75 BPM | SYSTOLIC BLOOD PRESSURE: 105 MMHG | OXYGEN SATURATION: 95 % | DIASTOLIC BLOOD PRESSURE: 66 MMHG | BODY MASS INDEX: 17.24 KG/M2 | WEIGHT: 107.3 LBS

## 2018-08-20 LAB
ANION GAP SERPL CALCULATED.3IONS-SCNC: 6 MMOL/L (ref 9–17)
BUN BLDV-MCNC: 14 MG/DL (ref 6–20)
BUN/CREAT BLD: 25 (ref 9–20)
CALCIUM SERPL-MCNC: 7.7 MG/DL (ref 8.6–10.4)
CHLORIDE BLD-SCNC: 96 MMOL/L (ref 98–107)
CO2: 35 MMOL/L (ref 20–31)
CREAT SERPL-MCNC: 0.55 MG/DL (ref 0.7–1.2)
GFR AFRICAN AMERICAN: >60 ML/MIN
GFR NON-AFRICAN AMERICAN: >60 ML/MIN
GFR SERPL CREATININE-BSD FRML MDRD: ABNORMAL ML/MIN/{1.73_M2}
GFR SERPL CREATININE-BSD FRML MDRD: ABNORMAL ML/MIN/{1.73_M2}
GLUCOSE BLD-MCNC: 64 MG/DL (ref 70–99)
HCT VFR BLD CALC: 33.2 % (ref 40.7–50.3)
HEMOGLOBIN: 11.4 G/DL (ref 13–17)
MAGNESIUM: 2 MG/DL (ref 1.6–2.6)
MCH RBC QN AUTO: 33.5 PG (ref 25.2–33.5)
MCHC RBC AUTO-ENTMCNC: 34.3 G/DL (ref 28.4–34.8)
MCV RBC AUTO: 97.6 FL (ref 82.6–102.9)
NRBC AUTOMATED: 0 PER 100 WBC
PDW BLD-RTO: 13.2 % (ref 11.8–14.4)
PLATELET # BLD: 155 K/UL (ref 138–453)
PMV BLD AUTO: 8.9 FL (ref 8.1–13.5)
POTASSIUM SERPL-SCNC: 3.1 MMOL/L (ref 3.7–5.3)
RBC # BLD: 3.4 M/UL (ref 4.21–5.77)
SODIUM BLD-SCNC: 137 MMOL/L (ref 135–144)
WBC # BLD: 9.3 K/UL (ref 3.5–11.3)

## 2018-08-20 PROCEDURE — 36415 COLL VENOUS BLD VENIPUNCTURE: CPT

## 2018-08-20 PROCEDURE — 94640 AIRWAY INHALATION TREATMENT: CPT

## 2018-08-20 PROCEDURE — 6370000000 HC RX 637 (ALT 250 FOR IP): Performed by: PHYSICIAN ASSISTANT

## 2018-08-20 PROCEDURE — 80048 BASIC METABOLIC PNL TOTAL CA: CPT

## 2018-08-20 PROCEDURE — 6370000000 HC RX 637 (ALT 250 FOR IP): Performed by: INTERNAL MEDICINE

## 2018-08-20 PROCEDURE — 97530 THERAPEUTIC ACTIVITIES: CPT

## 2018-08-20 PROCEDURE — 94664 DEMO&/EVAL PT USE INHALER: CPT

## 2018-08-20 PROCEDURE — 2580000003 HC RX 258: Performed by: INTERNAL MEDICINE

## 2018-08-20 PROCEDURE — 85027 COMPLETE CBC AUTOMATED: CPT

## 2018-08-20 PROCEDURE — 97110 THERAPEUTIC EXERCISES: CPT

## 2018-08-20 PROCEDURE — 83735 ASSAY OF MAGNESIUM: CPT

## 2018-08-20 PROCEDURE — 6370000000 HC RX 637 (ALT 250 FOR IP): Performed by: FAMILY MEDICINE

## 2018-08-20 RX ORDER — DILTIAZEM HYDROCHLORIDE 180 MG/1
180 CAPSULE, COATED, EXTENDED RELEASE ORAL DAILY
Qty: 30 CAPSULE | Refills: 0 | Status: SHIPPED | OUTPATIENT
Start: 2018-08-20 | End: 2018-08-20

## 2018-08-20 RX ORDER — POTASSIUM CHLORIDE 750 MG/1
20 TABLET, EXTENDED RELEASE ORAL DAILY
Qty: 60 TABLET | Refills: 3 | Status: ON HOLD | DISCHARGE
Start: 2018-08-20 | End: 2019-02-01 | Stop reason: HOSPADM

## 2018-08-20 RX ORDER — IPRATROPIUM BROMIDE AND ALBUTEROL SULFATE 2.5; .5 MG/3ML; MG/3ML
1 SOLUTION RESPIRATORY (INHALATION) 3 TIMES DAILY
Status: DISCONTINUED | OUTPATIENT
Start: 2018-08-20 | End: 2018-08-20 | Stop reason: HOSPADM

## 2018-08-20 RX ORDER — LOSARTAN POTASSIUM 100 MG/1
50 TABLET ORAL DAILY
Qty: 90 TABLET | Refills: 3 | Status: ON HOLD | DISCHARGE
Start: 2018-08-20 | End: 2018-10-15 | Stop reason: HOSPADM

## 2018-08-20 RX ORDER — DILTIAZEM HYDROCHLORIDE 180 MG/1
180 CAPSULE, COATED, EXTENDED RELEASE ORAL DAILY
Qty: 30 CAPSULE | Refills: 0 | DISCHARGE
Start: 2018-08-20

## 2018-08-20 RX ORDER — POTASSIUM CHLORIDE 750 MG/1
10 TABLET, EXTENDED RELEASE ORAL DAILY
Qty: 60 TABLET | Refills: 3 | Status: SHIPPED | OUTPATIENT
Start: 2018-08-20 | End: 2018-08-20

## 2018-08-20 RX ORDER — ALPRAZOLAM 0.5 MG/1
0.5 TABLET ORAL 3 TIMES DAILY PRN
Qty: 30 TABLET | Refills: 0 | Status: SHIPPED | OUTPATIENT
Start: 2018-08-20 | End: 2018-09-21 | Stop reason: SDUPTHER

## 2018-08-20 RX ORDER — CITALOPRAM 10 MG/1
10 TABLET ORAL DAILY
Qty: 30 TABLET | Refills: 0 | Status: SHIPPED | OUTPATIENT
Start: 2018-08-20 | End: 2018-08-20

## 2018-08-20 RX ORDER — CITALOPRAM 10 MG/1
10 TABLET ORAL DAILY
Qty: 30 TABLET | Refills: 0 | DISCHARGE
Start: 2018-08-20 | End: 2018-09-21 | Stop reason: SDUPTHER

## 2018-08-20 RX ORDER — PREDNISONE 10 MG/1
TABLET ORAL
Qty: 30 TABLET | Refills: 0 | DISCHARGE
Start: 2018-08-20 | End: 2018-10-05 | Stop reason: ALTCHOICE

## 2018-08-20 RX ADMIN — APIXABAN 5 MG: 5 TABLET, FILM COATED ORAL at 08:41

## 2018-08-20 RX ADMIN — MONTELUKAST SODIUM 10 MG: 10 TABLET, FILM COATED ORAL at 08:42

## 2018-08-20 RX ADMIN — MOMETASONE FUROATE AND FORMOTEROL FUMARATE DIHYDRATE 2 PUFF: 200; 5 AEROSOL RESPIRATORY (INHALATION) at 08:47

## 2018-08-20 RX ADMIN — THIAMINE HCL (VITAMIN B1) 50 MG TABLET 100 MG: at 08:41

## 2018-08-20 RX ADMIN — CETIRIZINE HYDROCHLORIDE 10 MG: 10 TABLET, FILM COATED ORAL at 08:42

## 2018-08-20 RX ADMIN — THERA TABS 1 TABLET: TAB at 08:42

## 2018-08-20 RX ADMIN — DILTIAZEM HYDROCHLORIDE 90 MG: 60 TABLET, FILM COATED ORAL at 05:39

## 2018-08-20 RX ADMIN — IPRATROPIUM BROMIDE AND ALBUTEROL SULFATE 1 AMPULE: .5; 3 SOLUTION RESPIRATORY (INHALATION) at 08:47

## 2018-08-20 RX ADMIN — PREDNISONE 30 MG: 20 TABLET ORAL at 08:42

## 2018-08-20 RX ADMIN — ALPRAZOLAM 0.5 MG: 0.5 TABLET ORAL at 03:23

## 2018-08-20 RX ADMIN — FOLIC ACID 1 MG: 1 TABLET ORAL at 08:42

## 2018-08-20 RX ADMIN — LOSARTAN POTASSIUM 50 MG: 50 TABLET, FILM COATED ORAL at 08:42

## 2018-08-20 RX ADMIN — GUAIFENESIN AND DEXTROMETHORPHAN HYDROBROMIDE 1 TABLET: 600; 30 TABLET, EXTENDED RELEASE ORAL at 08:41

## 2018-08-20 RX ADMIN — FAMOTIDINE 20 MG: 20 TABLET ORAL at 08:41

## 2018-08-20 RX ADMIN — Medication 10 ML: at 08:42

## 2018-08-20 RX ADMIN — CITALOPRAM 10 MG: 10 TABLET, FILM COATED ORAL at 08:42

## 2018-08-20 ASSESSMENT — PAIN SCALES - GENERAL
PAINLEVEL_OUTOF10: 0
PAINLEVEL_OUTOF10: 4
PAINLEVEL_OUTOF10: 4

## 2018-08-20 ASSESSMENT — PAIN DESCRIPTION - ORIENTATION
ORIENTATION: RIGHT;LEFT
ORIENTATION: LEFT

## 2018-08-20 ASSESSMENT — PAIN DESCRIPTION - PAIN TYPE
TYPE: ACUTE PAIN
TYPE: ACUTE PAIN

## 2018-08-20 ASSESSMENT — PAIN DESCRIPTION - LOCATION
LOCATION: RIB CAGE
LOCATION: RIB CAGE

## 2018-08-20 ASSESSMENT — PAIN DESCRIPTION - FREQUENCY: FREQUENCY: INTERMITTENT

## 2018-08-20 ASSESSMENT — PAIN DESCRIPTION - DESCRIPTORS: DESCRIPTORS: THROBBING

## 2018-08-20 NOTE — PROGRESS NOTES
RESPIRATORY ASSESSMENT PROTOCOL                                                                                              Patient Name: Edison Console Room#: [id number] : 1965     Admitting diagnosis: Acute on chronic respiratory failure (HCC) [J96.20]  Acute on chronic respiratory failure ([facility] [J96.20]       Medical History:   Past Medical History:   Diagnosis Date    Arthritis     COPD (chronic obstructive pulmonary disease) ([facility]     Hypertension     Scoliosis        PATIENT ASSESSMENT    LABORATORY DATA  Hematology:   Lab Results   Component Value Date    WBC 8.2 2018    RBC 3.63 2018    HGB 12.1 2018    HCT 35.7 2018     2018     Chemistry:    Lab Results   Component Value Date    PHART 7.466 2018    UNB3HGX 49.0 2018    PO2ART 61.5 2018    T8GXUIYC 92.6 2018    LSG5UJK 34.5 2018    PBEA 9.2 2018       Blood Culture:   Sputum Culture:     VITALS  Pulse: 78   Resp: 14  BP: 99/63  SpO2: 96 % O2 Device: Nasal cannula  Temp: 98.2 °F (36.8 °C)  Comment:     SKIN COLOR  [x] Normal  [] Pale  [] Dusky  [] Cyanotic      RESPIRATORY PATTERN  [] Normal  [x] Dyspnea  [] Cheyne-Lawrence  [] Kussmaul  [] Biots    AMBULATORY  [] Yes  [] No  [x] With Assistance    PEAK FLOW  Predicted:     Personal Best:        VITAL CAPACITY  Predicted value:  ml  Actual Value:  ml  30% of Predicted:  Ml    Patient Acuity 0 1 2 3 4 Score   Level of Concious (LOC) [x]  Alert & Oriented or Pt normal LOC []  Confused;follows directions []  Confused & uncooper-ative []  Obtunded []  Comatose 0   Respiratory Rate  (RR) []  Reg. rate & pattern. 12 - 20 bpm  []  Increased RR.  Greater than 20 bpm   [x]  SOB w/ exertion or RR greater than 24 bpm []  Access- ory muscle use at rest. Abn.  resp. []  SOB at rest.   2   Bilateral Breath Sounds (BBS) []  Clear []  Diminish-ed bases  [x]  Diminish-ed t/o, or rales   []  Sporadic, scattered wheezes or rhonchi []  Persistentwheezes and, or absent BBS 2   Cough [x]  Strong, effective, & non-prod. []  Effective & prod. Less than 25 ml (2 TBSP) over past 24 hrs []  Ineffective & non-prod to less than 25 ML over past 24 hrs []  Ineffective and, or greater than 25 ml sputum prod. past 24 hrs. []  Nonspon- taneous; Requires suctioning 0   Pulmonary History  (PULM HX) []  No smoking and no chronic pulmonaryhistory []  Former smoker. Quit over 12 mos. ago []  Current smoker or quit w/ in 12 mos []  Pulm. History and, or 20 pk/yr smoking hx [x]  Admitted w/ acute pulm. dx and, or has been admitted w/ pulm. dx 2 or more times over past 12 mos 4   Surgical History this Admit  (SURG HX) [x]  No surgery []  General surgery []  Lower abdominal []  Thoracic or upper abdominal   []  Thoracic w/ pulm. disease 0   Chest X-Ray (CXR)/CT Scan [x]  Clear or not applicable []  Not available []  Atelect- asis or pleural effusions []  Localized infiltrate or pulm. edema []  Con-solidated Infiltrates, bilateral, or in more than 1 lobe 0   Slow or Forced VC, FEV1 OR PEFR (PULM FXN)  [x]  80% or greater, or not indicated []  Pt. unable to perform []  FEV1 or PEFR or VC 51-79%. []  FEV1 or PEFR or VC  30-49%   []  FEV1 or PEFR or VC less than 30%   0   TOTAL ACUITY: 8       CARE PLAN    If Acuity Level is 2, 3, or 4 in any of the following:    [x] BILATERAL BREATH SOUNDS (BBS)     [x] PULMONARY HISTORY (PULM HX)  [] PULMONARY FUNCTION (PULM FX)    Goal: Improve respiratory functions in patients with airway disease and decrease WOB    [x] AEROSOL PROTOCOL    Total Acuity:   16-32  []  Secondary Assessment in 24 hrs Total Acuity:  9-15  []  Secondary Assessment in 24 hrs Total Acuity:  4-8  [x]  Secondary Assessment in 48 hrs Total Acuity:  0-3  []  Secondary Assessment in 72 hrs   HHN AEROSOL THERAPY with  [physician-ordered bronchodilator(s)] q 4 & Albuterol PRN q2 hrs. Breath-Actuated Neb if BBS Acuity = 4, and pt. can use MP.  Notify physician if condition deteriorates. HHN AEROSOL THERAPY with  [physician-ordered bronchodilator(s)]  QID and Albuterol PRN q4 hrs. Breath-Actuated Neb if BBS Acuity = 4, and pt. can use MP. Notify physician if condition deteriorates. MDI THERAPY with  2 actuations of [physician-ordered bronchodilator(s)] via spacer TID Albuterol and PRNq4 hrs. If unable to utilize MDI: HHN [physician-ordered bronchodilator(s)] TID and Albuterol PRN q4 hrs. Notify physician if condition deteriorates. MDI THERAPY with  [physician-ordered bronchodilator(s)] via spacer TID PRN. If unable to utilize MDI: HHN [physician-ordered bronchodilator(s)] TID PRN. Notify physician if condition deteriorates. If Acuity Level is 2, 3, or 4 in any of the following:    [] COUGH     [] SURGICAL HISTORY (SURG HX)  [] CHEST XRAY (CXR)    Goal: Improvement in sputum mobilization in patients with ineffective airway clearance. Reverse atelectasis. [] Bronchopulmonary Hygiene Protocol    Total Acuity:   16-32  []  Secondary Assessment in 24 hrs Total Acuity:  9-15  []  Secondary Assessment in 24 hrs Total Acuity:  4-8  []  Secondary Assessment in 48 hrs Total Acuity:  0-3  []  Secondary Assessment in 72 hrs   METANEB QID with [physician-ordered bronchodilator(s)] if CXR Acuity = 4; otherwise:  PD&P, PEP, or Vest QID & PRN  NT Sxn PRN for ineffective cough  METANEB QID with [physician-ordered bronchodilator(s)] if CXR Acuity = 4; otherwise:  PD&P, PEP, or Vest TID & PRN  NT Sxn PRN for ineffective cough  Instruct patient to self-perform IS q1hr WA   Directed Cough self-performed q1hr WA     If Acuity Level is 2 or above in the following:    [] PULMONARY HISTORY (PULM HX)    Goal: Assist patient in quitting smoking to slow or stop the progression of lung disease.     [] Smoking Cessation Protocol    SMOKING CESSATION EDUCATION provided according to policy [id number]: (delgado with an X)  ____Yes    ____ No     ____ NA    Smoking Cessation Booklet given:  ____Yes  ____No ____Patient Mady Aloe

## 2018-08-20 NOTE — DISCHARGE SUMMARY
Discharge Summary    Ginny Hernandez  :  1965  MRN:  [id number]    Admit date:  2018      Discharge date: 2018     Admitting Physician:  Trey Eubanks MD    Consultants:  Dr. Linna Dubin, cardiology, Dr. Anneliese Gallego, pulmonary    Procedures: none    Complications: none    Discharge Condition: stable    Hospital Course:   Ginny Hernandez is a 48 y.o. male admitted with severe COPD and chronic respiratory failure on continuous home oxygen, presented to the ER due to progressively worsening shortness of breath, cough and wheezing. Denied any fevers, chills or night sweats. He stated he just couldn't catch his breath. He was admitted for COPD exacerbation 18 - 18 as well. Stated he felt better for a couple weeks after his discharge, but started to gradually get worse again. He continues to smoke cigarettes, but did state he was trying to cut back. CXR showed no infiltrates or rib fractures. He had been coughing so hard he had sharp, pleuritic pains in the left chest wall. He was admitted for COPD exacerbation and acute on chronic respiratory failure. Required BiPAP for respiratory support. He was in the ICU. He had instance of SVT/afib with RVR. He was seen by cardiology. He was placed on eliquis, cozaar decreased and diltiazem was added. HR now improved. He was seen by pulmonary. He was able to wean off BIPAP during the day, but will NEED TO BE QUALIFIED FOR HOME BIPAP per the recommendation of pulmonary. He will be discharged on steroid taper and maximal COPD management. Patient had protracted hospital course. He was very slow to improve. His poor lung status at baseline and his severe anxiety contributed to his long course. Discharge to SNF.      Exam:  GEN:  alert and oriented to person, place and time, cachexia  EYES:  PERRL  NECK: normal, supple  PULM: Distant throughout - NO wheezes, NO rales or rhonchi, no use of accessory muscles  COR:   no murmurs and tachycardia  ABD:    Flat, soft, 0.5 tablets by mouth daily             montelukast (SINGULAIR) 10 MG tablet  Take 1 tablet by mouth daily             nicotine (NICODERM CQ) 21 MG/24HR  Place 1 patch onto the skin every 24 hours             potassium chloride (KLOR-CON M) 10 MEQ extended release tablet  Take 2 tablets by mouth daily             predniSONE (DELTASONE) 10 MG tablet  4 tabs daily for 3 days, 3 tabs daily for 3 days, 2 tabs daily for 3 days, 1 tabs daily for 3 days             pseudoephedrine (SUDAFED 12 HR) 120 MG extended release tablet  Take 120 mg by mouth every 12 hours             SYMBICORT 80-4.5 MCG/ACT AERO  INHALE TWO PUFFS BY MOUTH INTO LUNGS TWICE DAILY             VENTOLIN  (90 Base) MCG/ACT inhaler  INHALE 2 PUFFS BY MOUTH EVERY 6 HOURS AS NEEDED FOR WHEEZING                 Patient Instructions:    Activity: activity as tolerated  Diet: regular diet  Wound Care: none needed  Other: BIPAP    Disposition:   [place] to [facility]    Follow up:  Patient will be followed by [address] [place] [address] [facility] in 1-2 weeks  F/u Dr. Esperanza Dow on Discharge (if applicable)  ACE/ARB in CHF: NA  Statin in MI: NA  ASA in MI: NA  Statin in CVA: NA  Antiplatelet in CVA: NA    Total time spent on discharge services: 40 minutes    Including the following activities:  Evaluation and Management of patient  Discussion with patient and/or surrogate about current care plan  Coordination with Case Management and/or   Coordination of care with Consultants (if applicable)   Coordination of care with Receiving Facility Physician (if applicable)  Completion of DME forms (if applicable)  Preparation of Discharge Summary  Preparation of Medication Reconciliation  Preparation of Discharge Prescriptions    Signed:  Imelda Ocampo PA-C  8/20/2018, 11:01 AM

## 2018-08-20 NOTE — PROGRESS NOTES
Occupational Therapy  Facility/Department: [facility] MED SURG  Daily Treatment Note  NAME: Edison Ortiz  : 1965  MRN: [id number]    Date of Service: 2018    Discharge Recommendations:  Continue to assess pending progress       Patient Diagnosis(es): The primary encounter diagnosis was Acute respiratory failure with hypercapnia ([facility] [address] Diagnoses of Anxiety, Left-sided chest wall pain, and History of COPD were also pertinent to this visit. has a past medical history of Arthritis; COPD (chronic obstructive pulmonary disease) ([facility] [address] Hypertension; and Scoliosis. has a past surgical history that includes Tonsillectomy; other surgical history (Left, 2017); and Facial reconstruction surgery (Left, 2017). Restrictions  Restrictions/Precautions  Restrictions/Precautions: General Precautions, Fall Risk  Subjective   General  Chart Reviewed: Yes  Patient assessed for rehabilitation services?: Yes  Response to previous treatment: Patient with no complaints from previous session  Family / Caregiver Present: No  Subjective  Subjective: Pt agreed to tx, 7/10 pain reported. General Comment  Comments: Increased \"shakiness\" as tx progressed. Pain Assessment  Patient Currently in Pain: Yes  Pain Assessment: 0-10  Pain Level: 4  Pain Type: Acute pain  Pain Location: Rib cage  Pain Orientation: Left  Vital Signs  Patient Currently in Pain: Yes   Orientation     Objective             Standing Balance  Sit to stand: Stand by assistance  Functional Mobility  Functional - Mobility Device: No device  Activity: To/from bathroom  Assist Level: Stand by assistance  Functional Mobility Comments: Verbal cues for O2 tubing management.   Bed mobility  Rolling to Left: Modified independent  Rolling to Right: Modified independent  Supine to Sit: Modified independent  Transfers  Sit to stand: Stand by assistance                                            Type of ROM/Therapeutic Exercise  Type of ROM/Therapeutic Exercise:

## 2018-08-20 NOTE — PROGRESS NOTES
PALLIATIVE CARE  Follow up visit with Homero Hale. He is on [facility] and is being discharged today to [address] [place] [address]. He states that he feels that he is doing better now. Feels his anxiety is more under control now. Was switched from Ativan to Xanax. Discussed that he has been started on Celexa for his depression. He states that he was not aware of that, but admits that his mood is improving. Reassurance and support given. Homero Hale is quite talkative today. We discuss the importance of his girlfriend quitting smoking at least in the house when he goes home. He states \"Unless I don't go home\". Reassured that [facility] is short term only and that the plan is for him to go home. We also discuss the importance of using the BiPap at home, even though he is feeling better. He talks about using it for a few hours then waking up and not feeling that he can wear it anymore. Likely needs Xanax at night time, possibly higher dose at night only. States that he is eating better, and that he feels full now. Encouragement given on his improving appetite. He denies any further questions or concerns. We discuss him calling for a referral to [facility] while he is in [address] [place] [address] as it may take a few weeks before they can connect with him. States that he will call. Recommendation for [facility] placed on his [facility]A M form. Message had been sent to his PCP earlier this admission regarding benefit of outpatient PC services. Will continue to follow and support. Reminded to not wait as long to come to hospital if needed. States understanding. Claudette Banda RN, Yoli Cruz and [facility] Nurse Coordinator  8/20/2018 10:01 AM

## 2018-08-20 NOTE — PLAN OF CARE
Problem: Falls - Risk of:  Goal: Will remain free from falls  Will remain free from falls   Outcome: Ongoing  Bed in low position. Wheels locked. 2/4 side rails are up. Fall band on. Call light within reach. Problem: Breathing Pattern - Ineffective:  Goal: Ability to achieve and maintain a regular respiratory rate will improve  Ability to achieve and maintain a regular respiratory rate will improve   Outcome: Ongoing  Lungs are diminished throughout, will continue to monitor. Problem: Gas Exchange - Impaired:  Goal: Levels of oxygenation will improve  Levels of oxygenation will improve   Outcome: Ongoing  SpO2 was 96% on 2L NC, will continue to monitor.

## 2018-08-20 NOTE — PROGRESS NOTES
Patient is discharge to [facility]. Discharge paper work done and fax to [facility]. He will go by [facility].   ESTER Sanders

## 2018-08-20 NOTE — PROGRESS NOTES
Report called over to the nurse at [facility] in charge of taking care of this pt.

## 2018-08-20 NOTE — PROGRESS NOTES
Pt states he doesn't need to go on BIPAP tonight, because he had blood work done today and Karlo said my blood gases are normal\". I explained to pt that his blood work is good because he uses the BiPAP, and that it was a good idea if he went on for HS. Pt reluctantly agrees to use the machine, but states \"I'm probably only going to use it for 3 or 4 hours\". I encouraged Peng  to wear BiPAP for as long as he is able to do so.

## 2018-08-23 ENCOUNTER — OFFICE VISIT (OUTPATIENT)
Dept: PULMONOLOGY | Age: 53
End: 2018-08-23
Payer: MEDICARE

## 2018-08-23 VITALS
OXYGEN SATURATION: 93 % | HEART RATE: 111 BPM | TEMPERATURE: 97.9 F | WEIGHT: 99.4 LBS | DIASTOLIC BLOOD PRESSURE: 71 MMHG | BODY MASS INDEX: 16.97 KG/M2 | SYSTOLIC BLOOD PRESSURE: 95 MMHG | RESPIRATION RATE: 20 BRPM | HEIGHT: 64 IN

## 2018-08-23 DIAGNOSIS — J96.11 CHRONIC RESPIRATORY FAILURE WITH HYPOXIA AND HYPERCAPNIA (HCC): ICD-10-CM

## 2018-08-23 DIAGNOSIS — J43.1 PANLOBULAR EMPHYSEMA (HCC): ICD-10-CM

## 2018-08-23 DIAGNOSIS — J44.9 COPD, VERY SEVERE (HCC): Primary | ICD-10-CM

## 2018-08-23 DIAGNOSIS — J96.12 CHRONIC RESPIRATORY FAILURE WITH HYPOXIA AND HYPERCAPNIA (HCC): ICD-10-CM

## 2018-08-23 PROCEDURE — 1111F DSCHRG MED/CURRENT MED MERGE: CPT | Performed by: INTERNAL MEDICINE

## 2018-08-23 PROCEDURE — 99215 OFFICE O/P EST HI 40 MIN: CPT | Performed by: INTERNAL MEDICINE

## 2018-08-23 PROCEDURE — G8427 DOCREV CUR MEDS BY ELIG CLIN: HCPCS | Performed by: INTERNAL MEDICINE

## 2018-08-23 PROCEDURE — G8926 SPIRO NO PERF OR DOC: HCPCS | Performed by: INTERNAL MEDICINE

## 2018-08-23 PROCEDURE — 3017F COLORECTAL CA SCREEN DOC REV: CPT | Performed by: INTERNAL MEDICINE

## 2018-08-23 PROCEDURE — 4004F PT TOBACCO SCREEN RCVD TLK: CPT | Performed by: INTERNAL MEDICINE

## 2018-08-23 PROCEDURE — 3023F SPIROM DOC REV: CPT | Performed by: INTERNAL MEDICINE

## 2018-08-23 PROCEDURE — G8419 CALC BMI OUT NRM PARAM NOF/U: HCPCS | Performed by: INTERNAL MEDICINE

## 2018-08-23 RX ORDER — MAGNESIUM HYDROXIDE/ALUMINUM HYDROXICE/SIMETHICONE 120; 1200; 1200 MG/30ML; MG/30ML; MG/30ML
15 SUSPENSION ORAL EVERY 6 HOURS PRN
COMMUNITY

## 2018-08-23 RX ORDER — LOPERAMIDE HYDROCHLORIDE 2 MG/1
2 CAPSULE ORAL 4 TIMES DAILY PRN
COMMUNITY

## 2018-08-23 NOTE — PATIENT INSTRUCTIONS
SURVEY:    You may be receiving a survey from [employer] regarding your visit today. Please complete the survey to enable us to provide the highest quality of care to you and your family. If you cannot score us a very good on any question, please call the office to discuss how we could have made your experience a very good one. Thank you.

## 2018-08-24 ENCOUNTER — HOSPITAL ENCOUNTER (OUTPATIENT)
Age: 53
Setting detail: SPECIMEN
Discharge: HOME | End: 2018-08-24
Payer: MEDICARE

## 2018-08-24 LAB
BILIRUBIN URINE: NEGATIVE
COLOR: YELLOW
COMMENT UA: ABNORMAL
GLUCOSE URINE: NEGATIVE
KETONES, URINE: NEGATIVE
LEUKOCYTE ESTERASE, URINE: NEGATIVE
NITRITE, URINE: NEGATIVE
PH UA: 6 (ref 5–9)
PROTEIN UA: NEGATIVE
SPECIFIC GRAVITY UA: >1.03 (ref 1.01–1.02)
TURBIDITY: CLEAR
URINE HGB: NEGATIVE
UROBILINOGEN, URINE: NORMAL

## 2018-08-24 PROCEDURE — 81003 URINALYSIS AUTO W/O SCOPE: CPT

## 2018-08-24 PROCEDURE — 87086 URINE CULTURE/COLONY COUNT: CPT

## 2018-08-25 LAB
CULTURE: NO GROWTH
Lab: NORMAL
SPECIMEN DESCRIPTION: NORMAL
STATUS: NORMAL

## 2018-08-27 ENCOUNTER — TELEPHONE (OUTPATIENT)
Dept: PULMONOLOGY | Age: 53
End: 2018-08-27

## 2018-08-27 DIAGNOSIS — J96.11 CHRONIC RESPIRATORY FAILURE WITH HYPOXIA AND HYPERCAPNIA (HCC): ICD-10-CM

## 2018-08-27 DIAGNOSIS — G47.33 OSA (OBSTRUCTIVE SLEEP APNEA): Primary | ICD-10-CM

## 2018-08-27 DIAGNOSIS — J96.02 ACUTE RESPIRATORY FAILURE WITH HYPOXIA AND HYPERCAPNIA (HCC): ICD-10-CM

## 2018-08-27 DIAGNOSIS — J44.9 COPD, SEVERE (HCC): Primary | Chronic | ICD-10-CM

## 2018-08-27 DIAGNOSIS — J96.12 CHRONIC RESPIRATORY FAILURE WITH HYPOXIA AND HYPERCAPNIA (HCC): ICD-10-CM

## 2018-08-27 DIAGNOSIS — J96.01 ACUTE RESPIRATORY FAILURE WITH HYPOXIA AND HYPERCAPNIA (HCC): ICD-10-CM

## 2018-08-29 ENCOUNTER — HOSPITAL ENCOUNTER (OUTPATIENT)
Dept: PULMONOLOGY | Age: 53
Discharge: HOME | End: 2018-08-29
Payer: MEDICARE

## 2018-08-29 ENCOUNTER — HOSPITAL ENCOUNTER (OUTPATIENT)
Age: 53
Setting detail: SPECIMEN
Discharge: HOME | End: 2018-08-29
Payer: MEDICARE

## 2018-08-29 DIAGNOSIS — J96.11 CHRONIC RESPIRATORY FAILURE WITH HYPOXIA AND HYPERCAPNIA (HCC): ICD-10-CM

## 2018-08-29 DIAGNOSIS — J96.12 CHRONIC RESPIRATORY FAILURE WITH HYPOXIA AND HYPERCAPNIA (HCC): ICD-10-CM

## 2018-08-29 DIAGNOSIS — J44.9 COPD, VERY SEVERE (HCC): ICD-10-CM

## 2018-08-29 LAB
ABSOLUTE EOS #: 0.22 K/UL (ref 0–0.44)
ABSOLUTE IMMATURE GRANULOCYTE: 0.05 K/UL (ref 0–0.3)
ABSOLUTE LYMPH #: 2.15 K/UL (ref 1.1–3.7)
ABSOLUTE MONO #: 0.86 K/UL (ref 0.1–1.2)
ALBUMIN SERPL-MCNC: 2.9 G/DL (ref 3.5–5.2)
ALBUMIN/GLOBULIN RATIO: 1.5 (ref 1–2.5)
ALP BLD-CCNC: 47 U/L (ref 40–129)
ALT SERPL-CCNC: 27 U/L (ref 5–41)
ANION GAP SERPL CALCULATED.3IONS-SCNC: 7 MMOL/L (ref 9–17)
AST SERPL-CCNC: 15 U/L
BASOPHILS # BLD: 0 % (ref 0–2)
BASOPHILS ABSOLUTE: <0.03 K/UL (ref 0–0.2)
BILIRUB SERPL-MCNC: 0.66 MG/DL (ref 0.3–1.2)
BUN BLDV-MCNC: 15 MG/DL (ref 6–20)
BUN/CREAT BLD: 23 (ref 9–20)
CALCIUM SERPL-MCNC: 8.8 MG/DL (ref 8.6–10.4)
CHLORIDE BLD-SCNC: 99 MMOL/L (ref 98–107)
CO2: 31 MMOL/L (ref 20–31)
CREAT SERPL-MCNC: 0.65 MG/DL (ref 0.7–1.2)
DIFFERENTIAL TYPE: ABNORMAL
EOSINOPHILS RELATIVE PERCENT: 2 % (ref 1–4)
GFR AFRICAN AMERICAN: >60 ML/MIN
GFR NON-AFRICAN AMERICAN: >60 ML/MIN
GFR SERPL CREATININE-BSD FRML MDRD: ABNORMAL ML/MIN/{1.73_M2}
GFR SERPL CREATININE-BSD FRML MDRD: ABNORMAL ML/MIN/{1.73_M2}
GLUCOSE BLD-MCNC: 81 MG/DL (ref 70–99)
HCT VFR BLD CALC: 34 % (ref 40.7–50.3)
HEMOGLOBIN: 11.3 G/DL (ref 13–17)
IMMATURE GRANULOCYTES: 1 %
LYMPHOCYTES # BLD: 20 % (ref 24–43)
MCH RBC QN AUTO: 33.7 PG (ref 25.2–33.5)
MCHC RBC AUTO-ENTMCNC: 33.2 G/DL (ref 28.4–34.8)
MCV RBC AUTO: 101.5 FL (ref 82.6–102.9)
MONOCYTES # BLD: 8 % (ref 3–12)
NRBC AUTOMATED: 0 PER 100 WBC
PDW BLD-RTO: 14 % (ref 11.8–14.4)
PLATELET # BLD: 134 K/UL (ref 138–453)
PLATELET ESTIMATE: ABNORMAL
PMV BLD AUTO: 9 FL (ref 8.1–13.5)
POTASSIUM SERPL-SCNC: 3.9 MMOL/L (ref 3.7–5.3)
RBC # BLD: 3.35 M/UL (ref 4.21–5.77)
RBC # BLD: ABNORMAL 10*6/UL
SEG NEUTROPHILS: 69 % (ref 36–65)
SEGMENTED NEUTROPHILS ABSOLUTE COUNT: 7.34 K/UL (ref 1.5–8.1)
SODIUM BLD-SCNC: 137 MMOL/L (ref 135–144)
TOTAL PROTEIN: 4.8 G/DL (ref 6.4–8.3)
WBC # BLD: 10.6 K/UL (ref 3.5–11.3)
WBC # BLD: ABNORMAL 10*3/UL

## 2018-08-29 PROCEDURE — 80053 COMPREHEN METABOLIC PANEL: CPT

## 2018-08-29 PROCEDURE — 36415 COLL VENOUS BLD VENIPUNCTURE: CPT

## 2018-08-29 PROCEDURE — 85025 COMPLETE CBC W/AUTO DIFF WBC: CPT

## 2018-08-29 PROCEDURE — 94762 N-INVAS EAR/PLS OXIMTRY CONT: CPT

## 2018-08-29 PROCEDURE — P9604 ONE-WAY ALLOW PRORATED TRIP: HCPCS

## 2018-09-04 ENCOUNTER — HOSPITAL ENCOUNTER (OUTPATIENT)
Dept: SLEEP CENTER | Age: 53
Discharge: HOME | End: 2018-09-04
Payer: MEDICARE

## 2018-09-04 DIAGNOSIS — G47.33 OSA (OBSTRUCTIVE SLEEP APNEA): ICD-10-CM

## 2018-09-04 DIAGNOSIS — J96.11 CHRONIC RESPIRATORY FAILURE WITH HYPOXIA AND HYPERCAPNIA (HCC): ICD-10-CM

## 2018-09-04 DIAGNOSIS — J96.12 CHRONIC RESPIRATORY FAILURE WITH HYPOXIA AND HYPERCAPNIA (HCC): ICD-10-CM

## 2018-09-04 PROCEDURE — 95810 POLYSOM 6/> YRS 4/> PARAM: CPT

## 2018-09-04 ASSESSMENT — SLEEP AND FATIGUE QUESTIONNAIRES
HOW LIKELY ARE YOU TO NOD OFF OR FALL ASLEEP WHILE SITTING QUIETLY AFTER LUNCH WITHOUT ALCOHOL: 1
HOW LIKELY ARE YOU TO NOD OFF OR FALL ASLEEP WHILE LYING DOWN TO REST IN THE AFTERNOON WHEN CIRCUMSTANCES PERMIT: 3
HOW LIKELY ARE YOU TO NOD OFF OR FALL ASLEEP IN A CAR, WHILE STOPPED FOR A FEW MINUTES IN TRAFFIC: 0
ESS TOTAL SCORE: 9
HOW LIKELY ARE YOU TO NOD OFF OR FALL ASLEEP WHILE SITTING INACTIVE IN A PUBLIC PLACE: 1
HOW LIKELY ARE YOU TO NOD OFF OR FALL ASLEEP WHILE WATCHING TV: 2
HOW LIKELY ARE YOU TO NOD OFF OR FALL ASLEEP WHEN YOU ARE A PASSENGER IN A CAR FOR AN HOUR WITHOUT A BREAK: 1
NECK CIRCUMFERENCE (INCHES): 14
HOW LIKELY ARE YOU TO NOD OFF OR FALL ASLEEP WHILE SITTING AND TALKING TO SOMEONE: 1
HOW LIKELY ARE YOU TO NOD OFF OR FALL ASLEEP WHILE SITTING AND READING: 0

## 2018-09-04 NOTE — PROCEDURES
[facility] [address] [place]                                PULMONARY FUNCTION    PATIENT NAME: Cruz Kwok                     :        1965  MED REC NO:   [id number]                              ROOM:  ACCOUNT NO:   [de-identified]                           ADMIT DATE: 2018  PROVIDER:     Sae Killer    DATE OF PROCEDURE:  2018    NOCTURNAL PULSE OXIMETRY STUDY    Study done on 2018, completed on 2018. The oximetry study was done overnight off BiPAP, but on 2 L of nasal  cannula. Total valid sampling was 9 hours, 35 minutes and 36 seconds. Highest oxygen saturation was 99%, lowest 86%. The patient spent 14 seconds below 88%, 99.6% of the time was spent greater  than 90% pulse oximetry saturation. FINAL IMPRESSION:  The study does not show nocturnal hypoxemia on 2 L of  nasal cannula.         [facility]    D: 2018 15:36:10       T: 2018 22:00:17     [email or web address]  Job#: [id number]     Doc#: [id number]    CC:

## 2018-09-05 ENCOUNTER — HOSPITAL ENCOUNTER (OUTPATIENT)
Age: 53
Setting detail: SPECIMEN
Discharge: HOME | End: 2018-09-05
Payer: MEDICARE

## 2018-09-05 LAB
ALBUMIN SERPL-MCNC: 3.7 G/DL (ref 3.5–5.2)
ALBUMIN/GLOBULIN RATIO: 1.3 (ref 1–2.5)
ALP BLD-CCNC: 64 U/L (ref 40–129)
ALT SERPL-CCNC: 31 U/L (ref 5–41)
ANION GAP SERPL CALCULATED.3IONS-SCNC: 13 MMOL/L (ref 9–17)
AST SERPL-CCNC: 22 U/L
BILIRUB SERPL-MCNC: 1.02 MG/DL (ref 0.3–1.2)
BUN BLDV-MCNC: 19 MG/DL (ref 6–20)
BUN/CREAT BLD: 27 (ref 9–20)
CALCIUM SERPL-MCNC: 9.2 MG/DL (ref 8.6–10.4)
CHLORIDE BLD-SCNC: 94 MMOL/L (ref 98–107)
CO2: 28 MMOL/L (ref 20–31)
CREAT SERPL-MCNC: 0.7 MG/DL (ref 0.7–1.2)
GFR AFRICAN AMERICAN: >60 ML/MIN
GFR NON-AFRICAN AMERICAN: >60 ML/MIN
GFR SERPL CREATININE-BSD FRML MDRD: ABNORMAL ML/MIN/{1.73_M2}
GFR SERPL CREATININE-BSD FRML MDRD: ABNORMAL ML/MIN/{1.73_M2}
GLUCOSE BLD-MCNC: 92 MG/DL (ref 70–99)
HCT VFR BLD CALC: 42.3 % (ref 40.7–50.3)
HEMOGLOBIN: 13.8 G/DL (ref 13–17)
MCH RBC QN AUTO: 33.6 PG (ref 25.2–33.5)
MCHC RBC AUTO-ENTMCNC: 32.6 G/DL (ref 28.4–34.8)
MCV RBC AUTO: 102.9 FL (ref 82.6–102.9)
NRBC AUTOMATED: 0 PER 100 WBC
PDW BLD-RTO: 14.5 % (ref 11.8–14.4)
PLATELET # BLD: 190 K/UL (ref 138–453)
PMV BLD AUTO: 9.1 FL (ref 8.1–13.5)
POTASSIUM SERPL-SCNC: 4.3 MMOL/L (ref 3.7–5.3)
RBC # BLD: 4.11 M/UL (ref 4.21–5.77)
SODIUM BLD-SCNC: 135 MMOL/L (ref 135–144)
TOTAL PROTEIN: 6.6 G/DL (ref 6.4–8.3)
WBC # BLD: 9.3 K/UL (ref 3.5–11.3)

## 2018-09-05 PROCEDURE — 36415 COLL VENOUS BLD VENIPUNCTURE: CPT

## 2018-09-05 PROCEDURE — 80053 COMPREHEN METABOLIC PANEL: CPT

## 2018-09-05 PROCEDURE — P9604 ONE-WAY ALLOW PRORATED TRIP: HCPCS

## 2018-09-05 PROCEDURE — 85027 COMPLETE CBC AUTOMATED: CPT

## 2018-09-06 NOTE — TELEPHONE ENCOUNTER
Needing new order for nocturnal ox. Off o2 to see if we can get him qualified for the BiPAP. Please advise.

## 2018-09-07 ENCOUNTER — CARE COORDINATION (OUTPATIENT)
Dept: CASE MANAGEMENT | Age: 53
End: 2018-09-07

## 2018-09-07 DIAGNOSIS — J44.9 COPD, SEVERE (HCC): Primary | Chronic | ICD-10-CM

## 2018-09-10 NOTE — TELEPHONE ENCOUNTER
Called and updated by [facility] rehab that the patient is buying his own Bipap machine and would be discharged as indicated on Friday 9/7/2018.

## 2018-09-11 NOTE — PROGRESS NOTES
[address] [address]                                    SLEEP STUDY    PATIENT NAME: Em Gentile                     :        1965  MED REC NO:   [id number]                              ROOM:  ACCOUNT NO:   [de-identified]                           ADMIT DATE: 2018  PROVIDER:     Patt Flowers    SLEEP IMPROVEMENT [place]    INTERPRETATION OF CLINICAL POLYSOMNOGRAPHY    DATE OF STUDY:  2018    REFERRING PHYSICIAN:  Dr. Hood Diaz:  1. Obstructive sleep apnea syndrome. 2.  Hypertension. PROCEDURE STANDARD:  It was a 1-night study. PROCEDURE:  The sleep/wake evaluation consisted of an intensive intake  interview, one night of clinical polysomnography, a nocturnal respiratory  battery, left and right leg anterior tibialis surface electromyography. The standard montage for clinical polysomnography included the  electroencephalogram, the electro-oculogram, mentalis surface  electromyography, and modified Lead II cardiography. The respiratory  battery consisted of measurements of oral/nasal airflow by heat thermistor,  nasal pressure transducer, thoracic and abdominal effort by Respiratory  Inductance Plethysmography. Nocturnal oxyhemoglobin saturations were  obtained by finger oximetry. Polysomnography revealed that the patient spent 450 minutes in bed with a  total sleep time of 364 minutes. Sleep efficiency was reduced to 79%. Latency of sleep onset was normal a 12 minutes. There were 90 sleep stage  changes. There were 26 awakenings during the night. Sleep architecture was abnormal with 11% stage I, 80% stage II, 0% delta,  and 9% REM. Latency of various sleep stages were normal other than prolonged latency of  REM, which was 178 minutes. Polysomnography did not reveal any other abnormality.     Electrocardiogram did not reveal any arrhythmia. Respiratory evaluation revealed that the patient had a total of 4  respiratory events, out of which 1 was complete and 3 were partial  obstructions. The apnea/hypopnea index was less than 1. The lowest oxygen  saturation during the night being 85%. Movement disorder evaluation did not reveal any periodic leg movements. IMPRESSION:  1. The sleep study does not reveal any significant apneas. 2.  The patient did not have any periodic leg movements. The patient was  on supplemental oxygen most of the night and her oxygen saturation was well  maintained. However, when the oxygen was discontinued, there was a drop in  the oxygen saturation. Based on this observation, it is recommended that  the patient may continue the use of supplemental oxygen to maintain  adequate oxygenation during the night. Further workup should be conducted  for evaluating the etiology of hypoxemia. The patient has COPD and is  quite likely that hypoxemia related to COPD and the patient will be a  candidate for supplemental oxygen at least during the night. 3.  A 6-minute walk test should also be performed to assess her need for  daytime oxygenation. 4.  The patient should be instructed on proper sleep hygiene techniques. 5.  The patient should be advised not to consume any alcohol or hypnotics  at bedtime that may induce the apnea and worsen the oxygenation.         Vlad Jaeger    D:09/09/2018 20:02:52               T: 09/10/2018 5:04:09     [email or web address]  Job#: [id number]     Doc#: [id number]

## 2018-09-20 DIAGNOSIS — F41.9 ANXIETY: ICD-10-CM

## 2018-09-20 RX ORDER — ALPRAZOLAM 0.5 MG/1
0.5 TABLET ORAL 3 TIMES DAILY PRN
Qty: 30 TABLET | Refills: 0 | OUTPATIENT
Start: 2018-09-20 | End: 2018-11-19

## 2018-09-20 NOTE — TELEPHONE ENCOUNTER
Pt's yvonne called on on behalf of pt stating that he only has 1 pill left of his Xanax and they are requesting a refill on it, but it looks like the gave him enough to last until 10/19/2018?

## 2018-09-21 DIAGNOSIS — F41.9 ANXIETY: ICD-10-CM

## 2018-09-21 RX ORDER — CITALOPRAM 20 MG/1
20 TABLET ORAL DAILY
Qty: 90 TABLET | Refills: 1 | Status: SHIPPED | OUTPATIENT
Start: 2018-09-21 | End: 2018-10-05 | Stop reason: SDUPTHER

## 2018-09-21 RX ORDER — ALPRAZOLAM 0.5 MG/1
0.5 TABLET ORAL 2 TIMES DAILY PRN
Qty: 30 TABLET | Refills: 0 | Status: SHIPPED | OUTPATIENT
Start: 2018-09-21 | End: 2018-10-05 | Stop reason: SDUPTHER

## 2018-09-21 RX ORDER — TAMSULOSIN HYDROCHLORIDE 0.4 MG/1
0.4 CAPSULE ORAL DAILY
COMMUNITY
Start: 2018-09-05

## 2018-09-21 NOTE — TELEPHONE ENCOUNTER
fibrillation with RVR (HCC)     Non-cardiac chest pain     Severe anxiety     ETOH abuse     Closed fracture of one rib of left side

## 2018-09-29 ENCOUNTER — APPOINTMENT (OUTPATIENT)
Dept: GENERAL RADIOLOGY | Age: 53
DRG: 190 | End: 2018-09-29
Payer: MEDICARE

## 2018-09-29 ENCOUNTER — HOSPITAL ENCOUNTER (INPATIENT)
Age: 53
LOS: 2 days | Discharge: HOME HEALTH | DRG: 190 | End: 2018-10-01
Attending: INTERNAL MEDICINE | Admitting: INTERNAL MEDICINE
Payer: MEDICARE

## 2018-09-29 DIAGNOSIS — J96.11 CHRONIC RESPIRATORY FAILURE WITH HYPOXIA (HCC): Chronic | ICD-10-CM

## 2018-09-29 DIAGNOSIS — Z72.0 TOBACCO ABUSE: Chronic | ICD-10-CM

## 2018-09-29 DIAGNOSIS — J44.9 COPD, SEVERE (HCC): Chronic | ICD-10-CM

## 2018-09-29 DIAGNOSIS — R62.7 ADULT FAILURE TO THRIVE: Primary | ICD-10-CM

## 2018-09-29 DIAGNOSIS — J44.1 COPD EXACERBATION (HCC): ICD-10-CM

## 2018-09-29 LAB
ABSOLUTE EOS #: 0.03 K/UL (ref 0–0.44)
ABSOLUTE IMMATURE GRANULOCYTE: 0.08 K/UL (ref 0–0.3)
ABSOLUTE LYMPH #: 2.11 K/UL (ref 1.1–3.7)
ABSOLUTE MONO #: 1.08 K/UL (ref 0.1–1.2)
ALBUMIN SERPL-MCNC: 4.6 G/DL (ref 3.5–5.2)
ALBUMIN/GLOBULIN RATIO: 1.6 (ref 1–2.5)
ALP BLD-CCNC: 93 U/L (ref 40–129)
ALT SERPL-CCNC: 26 U/L (ref 5–41)
ANION GAP SERPL CALCULATED.3IONS-SCNC: 12 MMOL/L (ref 9–17)
AST SERPL-CCNC: 33 U/L
BASOPHILS # BLD: 0 % (ref 0–2)
BASOPHILS ABSOLUTE: 0.06 K/UL (ref 0–0.2)
BILIRUB SERPL-MCNC: 0.52 MG/DL (ref 0.3–1.2)
BUN BLDV-MCNC: 28 MG/DL (ref 6–20)
BUN/CREAT BLD: 42 (ref 9–20)
CALCIUM SERPL-MCNC: 10.4 MG/DL (ref 8.6–10.4)
CHLORIDE BLD-SCNC: 91 MMOL/L (ref 98–107)
CO2: 40 MMOL/L (ref 20–31)
CREAT SERPL-MCNC: 0.66 MG/DL (ref 0.7–1.2)
DIFFERENTIAL TYPE: ABNORMAL
EKG ATRIAL RATE: 132 BPM
EKG P AXIS: 84 DEGREES
EKG P-R INTERVAL: 134 MS
EKG Q-T INTERVAL: 290 MS
EKG QRS DURATION: 76 MS
EKG QTC CALCULATION (BAZETT): 429 MS
EKG R AXIS: -60 DEGREES
EKG T AXIS: 79 DEGREES
EKG VENTRICULAR RATE: 132 BPM
EOSINOPHILS RELATIVE PERCENT: 0 % (ref 1–4)
GFR AFRICAN AMERICAN: >60 ML/MIN
GFR NON-AFRICAN AMERICAN: >60 ML/MIN
GFR SERPL CREATININE-BSD FRML MDRD: ABNORMAL ML/MIN/{1.73_M2}
GFR SERPL CREATININE-BSD FRML MDRD: ABNORMAL ML/MIN/{1.73_M2}
GLUCOSE BLD-MCNC: 135 MG/DL (ref 70–99)
HCT VFR BLD CALC: 49.1 % (ref 40.7–50.3)
HEMOGLOBIN: 15.7 G/DL (ref 13–17)
IMMATURE GRANULOCYTES: 1 %
LACTIC ACID, WHOLE BLOOD: NORMAL MMOL/L (ref 0.7–2.1)
LACTIC ACID: 1.8 MMOL/L (ref 0.5–2.2)
LYMPHOCYTES # BLD: 13 % (ref 24–43)
MCH RBC QN AUTO: 33.5 PG (ref 25.2–33.5)
MCHC RBC AUTO-ENTMCNC: 32 G/DL (ref 28.4–34.8)
MCV RBC AUTO: 104.7 FL (ref 82.6–102.9)
MONOCYTES # BLD: 7 % (ref 3–12)
NRBC AUTOMATED: 0 PER 100 WBC
PDW BLD-RTO: 12.4 % (ref 11.8–14.4)
PLATELET # BLD: 363 K/UL (ref 138–453)
PLATELET ESTIMATE: ABNORMAL
PMV BLD AUTO: 9.7 FL (ref 8.1–13.5)
POTASSIUM SERPL-SCNC: 4.1 MMOL/L (ref 3.7–5.3)
RBC # BLD: 4.69 M/UL (ref 4.21–5.77)
RBC # BLD: ABNORMAL 10*6/UL
SEG NEUTROPHILS: 79 % (ref 36–65)
SEGMENTED NEUTROPHILS ABSOLUTE COUNT: 12.39 K/UL (ref 1.5–8.1)
SODIUM BLD-SCNC: 143 MMOL/L (ref 135–144)
TOTAL PROTEIN: 7.4 G/DL (ref 6.4–8.3)
TROPONIN INTERP: NORMAL
TROPONIN T: <0.03 NG/ML
WBC # BLD: 15.8 K/UL (ref 3.5–11.3)
WBC # BLD: ABNORMAL 10*3/UL

## 2018-09-29 PROCEDURE — 94664 DEMO&/EVAL PT USE INHALER: CPT

## 2018-09-29 PROCEDURE — 85025 COMPLETE CBC W/AUTO DIFF WBC: CPT

## 2018-09-29 PROCEDURE — 71046 X-RAY EXAM CHEST 2 VIEWS: CPT

## 2018-09-29 PROCEDURE — 2000000000 HC ICU R&B

## 2018-09-29 PROCEDURE — 6370000000 HC RX 637 (ALT 250 FOR IP): Performed by: INTERNAL MEDICINE

## 2018-09-29 PROCEDURE — 6370000000 HC RX 637 (ALT 250 FOR IP): Performed by: PHYSICIAN ASSISTANT

## 2018-09-29 PROCEDURE — 80053 COMPREHEN METABOLIC PANEL: CPT

## 2018-09-29 PROCEDURE — 93005 ELECTROCARDIOGRAM TRACING: CPT

## 2018-09-29 PROCEDURE — 94760 N-INVAS EAR/PLS OXIMETRY 1: CPT

## 2018-09-29 PROCEDURE — 83605 ASSAY OF LACTIC ACID: CPT

## 2018-09-29 PROCEDURE — 36415 COLL VENOUS BLD VENIPUNCTURE: CPT

## 2018-09-29 PROCEDURE — 87040 BLOOD CULTURE FOR BACTERIA: CPT

## 2018-09-29 PROCEDURE — 99285 EMERGENCY DEPT VISIT HI MDM: CPT

## 2018-09-29 PROCEDURE — 6360000002 HC RX W HCPCS: Performed by: PHYSICIAN ASSISTANT

## 2018-09-29 PROCEDURE — 96374 THER/PROPH/DIAG INJ IV PUSH: CPT

## 2018-09-29 PROCEDURE — 2580000003 HC RX 258: Performed by: INTERNAL MEDICINE

## 2018-09-29 PROCEDURE — 94640 AIRWAY INHALATION TREATMENT: CPT

## 2018-09-29 PROCEDURE — 6360000002 HC RX W HCPCS: Performed by: INTERNAL MEDICINE

## 2018-09-29 PROCEDURE — 84484 ASSAY OF TROPONIN QUANT: CPT

## 2018-09-29 RX ORDER — POTASSIUM CHLORIDE 20 MEQ/1
20 TABLET, EXTENDED RELEASE ORAL DAILY
Status: DISCONTINUED | OUTPATIENT
Start: 2018-09-30 | End: 2018-10-01 | Stop reason: HOSPADM

## 2018-09-29 RX ORDER — MONTELUKAST SODIUM 10 MG/1
10 TABLET ORAL DAILY
Status: DISCONTINUED | OUTPATIENT
Start: 2018-09-30 | End: 2018-10-01 | Stop reason: HOSPADM

## 2018-09-29 RX ORDER — ONDANSETRON 2 MG/ML
4 INJECTION INTRAMUSCULAR; INTRAVENOUS EVERY 6 HOURS PRN
Status: DISCONTINUED | OUTPATIENT
Start: 2018-09-29 | End: 2018-10-01 | Stop reason: HOSPADM

## 2018-09-29 RX ORDER — PREDNISONE 20 MG/1
20 TABLET ORAL 2 TIMES DAILY
Status: DISCONTINUED | OUTPATIENT
Start: 2018-09-29 | End: 2018-10-01 | Stop reason: HOSPADM

## 2018-09-29 RX ORDER — METHYLPREDNISOLONE SODIUM SUCCINATE 125 MG/2ML
125 INJECTION, POWDER, LYOPHILIZED, FOR SOLUTION INTRAMUSCULAR; INTRAVENOUS ONCE
Status: COMPLETED | OUTPATIENT
Start: 2018-09-29 | End: 2018-09-29

## 2018-09-29 RX ORDER — FAMOTIDINE 20 MG/1
20 TABLET, FILM COATED ORAL 2 TIMES DAILY
Status: DISCONTINUED | OUTPATIENT
Start: 2018-09-29 | End: 2018-10-01 | Stop reason: HOSPADM

## 2018-09-29 RX ORDER — TAMSULOSIN HYDROCHLORIDE 0.4 MG/1
0.4 CAPSULE ORAL DAILY
Status: DISCONTINUED | OUTPATIENT
Start: 2018-09-30 | End: 2018-10-01 | Stop reason: HOSPADM

## 2018-09-29 RX ORDER — LOPERAMIDE HYDROCHLORIDE 2 MG/1
2 CAPSULE ORAL 4 TIMES DAILY PRN
Status: DISCONTINUED | OUTPATIENT
Start: 2018-09-29 | End: 2018-10-01 | Stop reason: HOSPADM

## 2018-09-29 RX ORDER — LOSARTAN POTASSIUM 50 MG/1
50 TABLET ORAL DAILY
Status: DISCONTINUED | OUTPATIENT
Start: 2018-09-30 | End: 2018-10-01 | Stop reason: HOSPADM

## 2018-09-29 RX ORDER — IPRATROPIUM BROMIDE AND ALBUTEROL SULFATE 2.5; .5 MG/3ML; MG/3ML
SOLUTION RESPIRATORY (INHALATION)
Status: DISPENSED
Start: 2018-09-29 | End: 2018-09-30

## 2018-09-29 RX ORDER — ALPRAZOLAM 0.5 MG/1
0.5 TABLET ORAL 2 TIMES DAILY PRN
Status: DISCONTINUED | OUTPATIENT
Start: 2018-09-29 | End: 2018-10-01 | Stop reason: HOSPADM

## 2018-09-29 RX ORDER — ALBUTEROL SULFATE 2.5 MG/3ML
2.5 SOLUTION RESPIRATORY (INHALATION) EVERY 4 HOURS PRN
Status: DISCONTINUED | OUTPATIENT
Start: 2018-09-29 | End: 2018-10-01 | Stop reason: HOSPADM

## 2018-09-29 RX ORDER — SODIUM CHLORIDE 0.9 % (FLUSH) 0.9 %
10 SYRINGE (ML) INJECTION PRN
Status: DISCONTINUED | OUTPATIENT
Start: 2018-09-29 | End: 2018-10-01 | Stop reason: HOSPADM

## 2018-09-29 RX ORDER — SODIUM CHLORIDE 0.9 % (FLUSH) 0.9 %
10 SYRINGE (ML) INJECTION EVERY 12 HOURS SCHEDULED
Status: DISCONTINUED | OUTPATIENT
Start: 2018-09-29 | End: 2018-10-01 | Stop reason: HOSPADM

## 2018-09-29 RX ORDER — DILTIAZEM HYDROCHLORIDE 180 MG/1
180 CAPSULE, COATED, EXTENDED RELEASE ORAL DAILY
Status: DISCONTINUED | OUTPATIENT
Start: 2018-09-30 | End: 2018-10-01 | Stop reason: HOSPADM

## 2018-09-29 RX ORDER — IPRATROPIUM BROMIDE AND ALBUTEROL SULFATE 2.5; .5 MG/3ML; MG/3ML
1 SOLUTION RESPIRATORY (INHALATION)
Status: DISCONTINUED | OUTPATIENT
Start: 2018-09-30 | End: 2018-09-29

## 2018-09-29 RX ORDER — NICOTINE 21 MG/24HR
1 PATCH, TRANSDERMAL 24 HOURS TRANSDERMAL EVERY 24 HOURS
Status: DISCONTINUED | OUTPATIENT
Start: 2018-09-29 | End: 2018-10-01 | Stop reason: HOSPADM

## 2018-09-29 RX ORDER — IPRATROPIUM BROMIDE AND ALBUTEROL SULFATE 2.5; .5 MG/3ML; MG/3ML
1 SOLUTION RESPIRATORY (INHALATION) ONCE
Status: COMPLETED | OUTPATIENT
Start: 2018-09-29 | End: 2018-09-29

## 2018-09-29 RX ORDER — CITALOPRAM 20 MG/1
20 TABLET ORAL DAILY
Status: DISCONTINUED | OUTPATIENT
Start: 2018-09-30 | End: 2018-10-01 | Stop reason: HOSPADM

## 2018-09-29 RX ORDER — IPRATROPIUM BROMIDE AND ALBUTEROL SULFATE 2.5; .5 MG/3ML; MG/3ML
1 SOLUTION RESPIRATORY (INHALATION) 3 TIMES DAILY
Status: DISCONTINUED | OUTPATIENT
Start: 2018-09-30 | End: 2018-10-01 | Stop reason: HOSPADM

## 2018-09-29 RX ORDER — SODIUM CHLORIDE 9 MG/ML
INJECTION, SOLUTION INTRAVENOUS CONTINUOUS
Status: DISCONTINUED | OUTPATIENT
Start: 2018-09-29 | End: 2018-10-01 | Stop reason: HOSPADM

## 2018-09-29 RX ADMIN — Medication 10 ML: at 22:24

## 2018-09-29 RX ADMIN — MOMETASONE FUROATE AND FORMOTEROL FUMARATE DIHYDRATE 2 PUFF: 200; 5 AEROSOL RESPIRATORY (INHALATION) at 21:48

## 2018-09-29 RX ADMIN — APIXABAN 5 MG: 5 TABLET, FILM COATED ORAL at 22:18

## 2018-09-29 RX ADMIN — METHYLPREDNISOLONE SODIUM SUCCINATE 125 MG: 125 INJECTION, POWDER, FOR SOLUTION INTRAMUSCULAR; INTRAVENOUS at 17:47

## 2018-09-29 RX ADMIN — CEFTRIAXONE 1 G: 1 INJECTION, POWDER, FOR SOLUTION INTRAMUSCULAR; INTRAVENOUS at 22:17

## 2018-09-29 RX ADMIN — SODIUM CHLORIDE: 9 INJECTION, SOLUTION INTRAVENOUS at 22:19

## 2018-09-29 RX ADMIN — IPRATROPIUM BROMIDE AND ALBUTEROL SULFATE 1 AMPULE: .5; 3 SOLUTION RESPIRATORY (INHALATION) at 18:00

## 2018-09-29 RX ADMIN — IPRATROPIUM BROMIDE AND ALBUTEROL SULFATE 1 AMPULE: .5; 3 SOLUTION RESPIRATORY (INHALATION) at 21:34

## 2018-09-29 RX ADMIN — FAMOTIDINE 20 MG: 20 TABLET ORAL at 22:18

## 2018-09-29 ASSESSMENT — PAIN SCALES - GENERAL: PAINLEVEL_OUTOF10: 0

## 2018-09-29 ASSESSMENT — ENCOUNTER SYMPTOMS
NAUSEA: 0
WHEEZING: 0
SHORTNESS OF BREATH: 0
COUGH: 1
ABDOMINAL PAIN: 0
VOMITING: 0

## 2018-09-29 NOTE — ED PROVIDER NOTES
mouth 2 times daily    CITALOPRAM (CELEXA) 20 MG TABLET    Take 1 tablet by mouth daily    DILTIAZEM (CARTIA XT) 180 MG EXTENDED RELEASE CAPSULE    Take 1 capsule by mouth daily    INCRUSE ELLIPTA 62.5 MCG/INH AEPB    INHALE ONE PUFF INTO THE LUNGS ONCE DAILY    IPRATROPIUM (ATROVENT) 0.02 % NEBULIZER SOLUTION    Take 2.5 mLs by nebulization 4 times daily    LOPERAMIDE (IMODIUM) 2 MG CAPSULE    Take 2 mg by mouth 4 times daily as needed for Diarrhea    LOSARTAN (COZAAR) 100 MG TABLET    Take 0.5 tablets by mouth daily    MAGNESIUM HYDROXIDE (MILK OF MAGNESIA) 400 MG/5ML SUSPENSION    Take by mouth daily as needed for Constipation    MONTELUKAST (SINGULAIR) 10 MG TABLET    Take 1 tablet by mouth daily    NICOTINE (NICODERM CQ) 21 MG/24HR    Place 1 patch onto the skin every 24 hours    POTASSIUM CHLORIDE (KLOR-CON M) 10 MEQ EXTENDED RELEASE TABLET    Take 2 tablets by mouth daily    PREDNISONE (DELTASONE) 10 MG TABLET    4 tabs daily for 3 days, 3 tabs daily for 3 days, 2 tabs daily for 3 days, 1 tabs daily for 3 days    PSEUDOEPHEDRINE (SUDAFED 12 HR) 120 MG EXTENDED RELEASE TABLET    Take 120 mg by mouth every 12 hours    SYMBICORT 80-4.5 MCG/ACT AERO    INHALE TWO PUFFS BY MOUTH INTO LUNGS TWICE DAILY    TAMSULOSIN (FLOMAX) 0.4 MG CAPSULE        VENTOLIN  (90 BASE) MCG/ACT INHALER    INHALE 2 PUFFS BY MOUTH EVERY 6 HOURS AS NEEDED FOR WHEEZING       ALLERGIES     Patient has no known allergies.     FAMILY HISTORY       Family History   Problem Relation Age of Onset    Stroke Mother     Emphysema Father     Cancer Father     Diabetes Paternal Grandmother     Stroke Paternal Grandmother         SOCIAL HISTORY       Social History     Social History    Marital status: Single     Spouse name: N/A    Number of children: N/A    Years of education: N/A     Social History Main Topics    Smoking status: Current Every Day Smoker     Packs/day: 0.10     Types: Cigarettes    Smokeless tobacco: Never Used Comment: cutting down    Alcohol use 12.6 oz/week     21 Cans of beer per week      Comment: 48oz beer per day    Drug use: No      Comment: pt states \"every now and then\"    Sexual activity: Not Asked     Other Topics Concern    None     Social History Narrative    None       REVIEW OF SYSTEMS       Review of Systems   Constitutional: Positive for fatigue. Negative for chills and fever. Respiratory: Positive for cough. Negative for shortness of breath and wheezing. Cardiovascular: Negative for chest pain. Gastrointestinal: Negative for abdominal pain, nausea and vomiting. Musculoskeletal: Positive for neck stiffness. Neurological: Negative for syncope and numbness. Review of systems as in HPI & PMH otherwise negative    PHYSICAL EXAM    (up to 7 for level 4, 8 or more for level 5)     ED Triage Vitals   BP Temp Temp Source Pulse Resp SpO2 Height Weight   09/29/18 1711 09/29/18 1708 09/29/18 1708 09/29/18 1708 09/29/18 1708 09/29/18 1708 09/29/18 1708 09/29/18 1708   (!) 127/96 98.8 °F (37.1 °C) Tympanic 125 18 99 % 5' 4\" (1.626 m) 100 lb (45.4 kg)       Physical Exam   Constitutional: He is oriented to person, place, and time. Cachectic   HENT:   Head: Normocephalic and atraumatic. Eyes: Conjunctivae are normal. No scleral icterus. Neck: Normal range of motion. Neck supple. Cardiovascular: Regular rhythm. Tachycardic   Pulmonary/Chest: He has no wheezes. Tachypnea, diminished throughout   Abdominal: Soft. Bowel sounds are normal. There is no tenderness. Musculoskeletal: Normal range of motion. Neurological: He is alert and oriented to person, place, and time. No cranial nerve deficit. Coordination normal.   Skin: Skin is warm and dry. Psychiatric: He has a normal mood and affect. His behavior is normal. Judgment and thought content normal.   Nursing note and vitals reviewed.       DIAGNOSTIC RESULTS     EKG: (none if blank)  All EKG's are interpreted by the Emergency Department

## 2018-09-30 LAB
ABSOLUTE EOS #: <0.03 K/UL (ref 0–0.44)
ABSOLUTE IMMATURE GRANULOCYTE: <0.03 K/UL (ref 0–0.3)
ABSOLUTE LYMPH #: 1.22 K/UL (ref 1.1–3.7)
ABSOLUTE MONO #: 0.44 K/UL (ref 0.1–1.2)
BASOPHILS # BLD: 0 % (ref 0–2)
BASOPHILS ABSOLUTE: <0.03 K/UL (ref 0–0.2)
DIFFERENTIAL TYPE: ABNORMAL
EOSINOPHILS RELATIVE PERCENT: 0 % (ref 1–4)
HCT VFR BLD CALC: 43 % (ref 40.7–50.3)
HEMOGLOBIN: 13.8 G/DL (ref 13–17)
IMMATURE GRANULOCYTES: 0 %
LYMPHOCYTES # BLD: 17 % (ref 24–43)
MCH RBC QN AUTO: 33.4 PG (ref 25.2–33.5)
MCHC RBC AUTO-ENTMCNC: 32.1 G/DL (ref 28.4–34.8)
MCV RBC AUTO: 104.1 FL (ref 82.6–102.9)
MONOCYTES # BLD: 6 % (ref 3–12)
NRBC AUTOMATED: 0 PER 100 WBC
PDW BLD-RTO: 12.7 % (ref 11.8–14.4)
PLATELET # BLD: 263 K/UL (ref 138–453)
PLATELET ESTIMATE: ABNORMAL
PMV BLD AUTO: 9.7 FL (ref 8.1–13.5)
RBC # BLD: 4.13 M/UL (ref 4.21–5.77)
RBC # BLD: ABNORMAL 10*6/UL
SEG NEUTROPHILS: 76 % (ref 36–65)
SEGMENTED NEUTROPHILS ABSOLUTE COUNT: 5.47 K/UL (ref 1.5–8.1)
WBC # BLD: 7.2 K/UL (ref 3.5–11.3)
WBC # BLD: ABNORMAL 10*3/UL

## 2018-09-30 PROCEDURE — 36415 COLL VENOUS BLD VENIPUNCTURE: CPT

## 2018-09-30 PROCEDURE — 85025 COMPLETE CBC W/AUTO DIFF WBC: CPT

## 2018-09-30 PROCEDURE — 2580000003 HC RX 258: Performed by: INTERNAL MEDICINE

## 2018-09-30 PROCEDURE — 6370000000 HC RX 637 (ALT 250 FOR IP): Performed by: INTERNAL MEDICINE

## 2018-09-30 PROCEDURE — 94640 AIRWAY INHALATION TREATMENT: CPT

## 2018-09-30 PROCEDURE — 94660 CPAP INITIATION&MGMT: CPT

## 2018-09-30 PROCEDURE — 1200000000 HC SEMI PRIVATE

## 2018-09-30 RX ORDER — LEVOFLOXACIN 500 MG/1
500 TABLET, FILM COATED ORAL DAILY
Status: DISCONTINUED | OUTPATIENT
Start: 2018-09-30 | End: 2018-10-01 | Stop reason: HOSPADM

## 2018-09-30 RX ADMIN — APIXABAN 5 MG: 5 TABLET, FILM COATED ORAL at 21:25

## 2018-09-30 RX ADMIN — ALPRAZOLAM 0.5 MG: 0.5 TABLET ORAL at 18:26

## 2018-09-30 RX ADMIN — MOMETASONE FUROATE AND FORMOTEROL FUMARATE DIHYDRATE 2 PUFF: 200; 5 AEROSOL RESPIRATORY (INHALATION) at 10:40

## 2018-09-30 RX ADMIN — MOMETASONE FUROATE AND FORMOTEROL FUMARATE DIHYDRATE 2 PUFF: 200; 5 AEROSOL RESPIRATORY (INHALATION) at 20:01

## 2018-09-30 RX ADMIN — CITALOPRAM HYDROBROMIDE 20 MG: 20 TABLET ORAL at 08:55

## 2018-09-30 RX ADMIN — MONTELUKAST SODIUM 10 MG: 10 TABLET, FILM COATED ORAL at 08:54

## 2018-09-30 RX ADMIN — TAMSULOSIN HYDROCHLORIDE 0.4 MG: 0.4 CAPSULE ORAL at 08:54

## 2018-09-30 RX ADMIN — PREDNISONE 20 MG: 20 TABLET ORAL at 08:55

## 2018-09-30 RX ADMIN — APIXABAN 5 MG: 5 TABLET, FILM COATED ORAL at 08:54

## 2018-09-30 RX ADMIN — POTASSIUM CHLORIDE 20 MEQ: 20 TABLET, EXTENDED RELEASE ORAL at 08:54

## 2018-09-30 RX ADMIN — LEVOFLOXACIN 500 MG: 500 TABLET, FILM COATED ORAL at 09:01

## 2018-09-30 RX ADMIN — FAMOTIDINE 20 MG: 20 TABLET ORAL at 08:54

## 2018-09-30 RX ADMIN — LOSARTAN POTASSIUM 50 MG: 50 TABLET ORAL at 08:55

## 2018-09-30 RX ADMIN — Medication 10 ML: at 21:26

## 2018-09-30 RX ADMIN — DILTIAZEM HYDROCHLORIDE 180 MG: 180 CAPSULE, COATED, EXTENDED RELEASE ORAL at 08:55

## 2018-09-30 RX ADMIN — IPRATROPIUM BROMIDE AND ALBUTEROL SULFATE 1 AMPULE: .5; 3 SOLUTION RESPIRATORY (INHALATION) at 10:40

## 2018-09-30 RX ADMIN — FAMOTIDINE 20 MG: 20 TABLET ORAL at 21:25

## 2018-09-30 RX ADMIN — IPRATROPIUM BROMIDE AND ALBUTEROL SULFATE 1 AMPULE: .5; 3 SOLUTION RESPIRATORY (INHALATION) at 15:35

## 2018-09-30 RX ADMIN — PREDNISONE 20 MG: 20 TABLET ORAL at 21:25

## 2018-09-30 RX ADMIN — IPRATROPIUM BROMIDE AND ALBUTEROL SULFATE 1 AMPULE: .5; 3 SOLUTION RESPIRATORY (INHALATION) at 20:01

## 2018-09-30 RX ADMIN — SODIUM CHLORIDE: 9 INJECTION, SOLUTION INTRAVENOUS at 12:02

## 2018-09-30 ASSESSMENT — PAIN SCALES - GENERAL: PAINLEVEL_OUTOF10: 0

## 2018-09-30 NOTE — H&P
systems were reviewed with the patient and are negative except as stated  Physical Exam:    Vitals:   Vitals:    09/30/18 0800   BP: (!) 124/97   Pulse: 104   Resp: (!) 46   Temp:    SpO2: 99%     Weight: 90 lb (40.8 kg)   Height: 5' 4\" (162.6 cm)   GEN:   Not oriented to date and Not oriented to place, no apparent distress. Is cachectic   EYES: No gross abnormalities. NECK: normal, supple, no lymphadenopathy,  no carotid bruits  PULM: decreased breath sounds noted- predominately clear  COR: regular rate & rhythm, no murmurs and no gallops  ABD:  soft, non-tender, non-distended, normal bowel sounds, no masses or organomegaly  EXT:   no cyanosis, clubbing or edema present    NEURO: mild confusion  SKIN:  no rashes or significant lesions      -----------------------------------------------------------------  Diagnostic Data: Reviewed    Assessment:      Hospital Problems:  Active Problems:    COPD exacerbation ([facility] [address]  Resolved Problems:    * No resolved hospital problems.  *      All Problems:  Patient Active Problem List   Diagnosis    COPD, severe ([address]    Uncontrolled hypertension    Dyslipidemia    COPD exacerbation (HCC)    Severe malnutrition (HCC)    Chronic respiratory failure with hypoxia (HCC)    Acute on chronic respiratory failure (HCC)    Tobacco abuse    Paroxysmal atrial fibrillation (HCC)    Atrial fibrillation with RVR (HCC)    Non-cardiac chest pain    Severe anxiety    ETOH abuse    Closed fracture of one rib of left side           Plan:       · This patient requires inpatient admission because of acute exacerbation of COPD, acute on chronic hypoxic respiratory failure   · Factors affecting the medical complexity of this patient include COPD, respiratory failure, FTT, atrial fib, Etoh abuse, severe malnutrition  · Estimated length of stay is 2 days  · Put patient back on meds, treat the acute exacerbation  ·   High risk medications: none    Lily Stovall MD  CORE MEASURES  · DVT prophylaxis: already on chronic anticoagulation  · Decubitus ulcer present on admission: No  · CODE STATUS: FULL CODE  · Nutrition Status: poor  · Physical therapy: No   · Old Charts reviewed: Yes  · EKG Reviewed:  Yes  · Advance Directive Addressed: Yes    Angelina Angelucci , M.D.

## 2018-09-30 NOTE — PLAN OF CARE
Problem: Breathing Pattern - Ineffective:  Goal: Ability to achieve and maintain a regular respiratory rate will improve  Ability to achieve and maintain a regular respiratory rate will improve  Outcome: Ongoing  Admitted to 308 per cart from ER. Respirations labored with exertion. Oxygen on at 4 liters per NC. Oxygen saturation 100 %. Oxygen turned down to 3 liter. Lungs diminished throughout. Heart tones strong and regular. Monitors applied. Monitor shows RST at 116. No edema noted. Pt thin, able to see between ribs. Weight of 90 lbs noted.

## 2018-10-01 VITALS
OXYGEN SATURATION: 97 % | HEART RATE: 71 BPM | TEMPERATURE: 98 F | WEIGHT: 94.6 LBS | DIASTOLIC BLOOD PRESSURE: 72 MMHG | HEIGHT: 64 IN | SYSTOLIC BLOOD PRESSURE: 109 MMHG | BODY MASS INDEX: 16.15 KG/M2 | RESPIRATION RATE: 16 BRPM

## 2018-10-01 PROCEDURE — 2580000003 HC RX 258: Performed by: INTERNAL MEDICINE

## 2018-10-01 PROCEDURE — 94660 CPAP INITIATION&MGMT: CPT

## 2018-10-01 PROCEDURE — 94640 AIRWAY INHALATION TREATMENT: CPT

## 2018-10-01 PROCEDURE — 6370000000 HC RX 637 (ALT 250 FOR IP): Performed by: INTERNAL MEDICINE

## 2018-10-01 PROCEDURE — 94664 DEMO&/EVAL PT USE INHALER: CPT

## 2018-10-01 RX ORDER — CEFUROXIME AXETIL 500 MG/1
500 TABLET ORAL 2 TIMES DAILY
Qty: 14 TABLET | Refills: 0 | Status: ON HOLD | OUTPATIENT
Start: 2018-10-01 | End: 2018-10-15 | Stop reason: HOSPADM

## 2018-10-01 RX ADMIN — MONTELUKAST SODIUM 10 MG: 10 TABLET, FILM COATED ORAL at 08:25

## 2018-10-01 RX ADMIN — IPRATROPIUM BROMIDE AND ALBUTEROL SULFATE 1 AMPULE: .5; 3 SOLUTION RESPIRATORY (INHALATION) at 09:13

## 2018-10-01 RX ADMIN — TAMSULOSIN HYDROCHLORIDE 0.4 MG: 0.4 CAPSULE ORAL at 08:25

## 2018-10-01 RX ADMIN — IPRATROPIUM BROMIDE AND ALBUTEROL SULFATE 1 AMPULE: .5; 3 SOLUTION RESPIRATORY (INHALATION) at 15:01

## 2018-10-01 RX ADMIN — ALPRAZOLAM 0.5 MG: 0.5 TABLET ORAL at 08:25

## 2018-10-01 RX ADMIN — SODIUM CHLORIDE: 9 INJECTION, SOLUTION INTRAVENOUS at 02:04

## 2018-10-01 RX ADMIN — LEVOFLOXACIN 500 MG: 500 TABLET, FILM COATED ORAL at 08:27

## 2018-10-01 RX ADMIN — FAMOTIDINE 20 MG: 20 TABLET ORAL at 08:25

## 2018-10-01 RX ADMIN — MOMETASONE FUROATE AND FORMOTEROL FUMARATE DIHYDRATE 2 PUFF: 200; 5 AEROSOL RESPIRATORY (INHALATION) at 09:13

## 2018-10-01 RX ADMIN — LOSARTAN POTASSIUM 50 MG: 50 TABLET ORAL at 08:27

## 2018-10-01 RX ADMIN — APIXABAN 5 MG: 5 TABLET, FILM COATED ORAL at 08:25

## 2018-10-01 RX ADMIN — PREDNISONE 20 MG: 20 TABLET ORAL at 08:27

## 2018-10-01 RX ADMIN — DILTIAZEM HYDROCHLORIDE 180 MG: 180 CAPSULE, COATED, EXTENDED RELEASE ORAL at 08:27

## 2018-10-01 RX ADMIN — CITALOPRAM HYDROBROMIDE 20 MG: 20 TABLET ORAL at 08:25

## 2018-10-01 RX ADMIN — POTASSIUM CHLORIDE 20 MEQ: 20 TABLET, EXTENDED RELEASE ORAL at 08:25

## 2018-10-01 ASSESSMENT — PAIN SCALES - GENERAL: PAINLEVEL_OUTOF10: 0

## 2018-10-01 NOTE — PROGRESS NOTES
Progress Note    SUBJECTIVE:  Patient seen for acute exacerbation of COPD. No complaints. ROS:   Constitutional: negative  for fevers, and negative for chills. Respiratory: positive for shortness of breath, negative for cough, and negative for wheezing  Cardiovascular: negative for chest pain, and negative for palpitations  Gastrointestinal: negative for abdominal pain, negative for nausea,negative for vomiting, negative for diarrhea, and negative for constipation     All other systems were reviewed with the patient and are negative unless otherwise stated in HPI    OBJECTIVE:    EXAM:  Vitals:    10/01/18 0543   BP:    Pulse:    Resp: 18   Temp:    SpO2:       Weight: 94 lb 9.6 oz (42.9 kg)    Height: 5' 4\" (162.6 cm)     GEN:   A & O x3, no apparent distress  EYES: No gross abnormalities. and PERRL  NECK: normal, supple, no lymphadenopathy,   PULM: decreased breath sounds noted- clear.  On 2L O2 and hs Bipap, as at home  COR: regular rate & rhythm, no murmurs and no gallops  ABD:  soft, non-tender, non-distended, normal bowel sounds, no masses or organomegaly  EXT:   no cyanosis, clubbing or edema present    NEURO: negative  SKIN:  no rashes or significant lesions    microbiology: cultures pending    CBC with Differential:    Lab Results   Component Value Date    WBC 7.2 09/30/2018    RBC 4.13 09/30/2018    HGB 13.8 09/30/2018    HCT 43.0 09/30/2018     09/30/2018    .1 09/30/2018    MCH 33.4 09/30/2018    MCHC 32.1 09/30/2018    RDW 12.7 09/30/2018    LYMPHOPCT 17 09/30/2018    MONOPCT 6 09/30/2018    BASOPCT 0 09/30/2018    MONOSABS 0.44 09/30/2018    LYMPHSABS 1.22 09/30/2018    EOSABS <0.03 09/30/2018    BASOSABS <0.03 09/30/2018    DIFFTYPE NOT REPORTED 09/30/2018           Xr Chest Standard (2 Vw)    Result Date: 9/29/2018  EXAMINATION: TWO VIEWS OF THE CHEST 9/29/2018 5:36 pm COMPARISON: Radiograph 08/09/2018 HISTORY: ORDERING SYSTEM PROVIDED HISTORY: Dyspnea TECHNOLOGIST PROVIDED HISTORY:

## 2018-10-01 NOTE — PROGRESS NOTES
Pt is a 48year old male admitted with COPD exacerbation. He is alert and oriented. Pt lives at home with his girlfriend. He uses oxygen, a shower chair, nebulizer and a bipap. Bi-pap - 30 days ago at [employer] - per pt it isn't working - [employer] is willing to look at it today if girlfriend brings it in prior to pt d/c. Pt asks SW to contact girlfriend to bring it in, message left. The girlfriend does the cleaning and he helps with the cooking. Pt manages his own medication and his girlfriend does the driving.     PCP is Darnell Phelps CNP. No financial barriers to filling meds.       D/C plan is home with home care - pt [facility], he is aware he needs education on his medication as per pt \"besides Xanax I'm not sure what I take everyday and I forget a lot\". Referral made for nursing and also for OT as pt is receptive to education on energy conservation.       ESTER Jaimes  10/1/2018

## 2018-10-01 NOTE — DISCHARGE SUMMARY
Physician Discharge Summary       Patient ID:  Carmen Gmaboa  [id number]  1965    Admission date: 9/29/2018    Discharge date: 10/1/2018     Admitting Physician: Sharifa Acosta MD     Primary Care Physician: ABHIJEET Schneider - SHARON     Primary Discharge Diagnoses:   Patient Active Problem List    Diagnosis Date Noted    Paroxysmal atrial fibrillation ([facility] 08/11/2018     Priority: High     Class: Acute    Closed fracture of one rib of left side 08/16/2018    Severe anxiety 08/15/2018    ETOH abuse 08/15/2018    [facility] with RVR ([facility]     Non-cardiac chest pain     Tobacco abuse 08/10/2018    Acute on chronic respiratory failure ([facility] 08/09/2018    Severe malnutrition ([facility] 06/11/2018    [facility] respiratory failure with hypoxia ([facility] 06/11/2018    COPD exacerbation ([facility] 06/09/2018    Dyslipidemia 04/11/2017    COPD, severe ([facility] 03/28/2017    Uncontrolled hypertension 03/28/2017       Additional Diagnoses:       Diagnosis Date    Arthritis     COPD (chronic obstructive pulmonary disease) ([facility]     Hypertension     Scoliosis        Physical exam:      -----------------------------------------------------------------  Exam:  GEN:   A & O x3, no apparent distress. Cachectic. EYES: No gross abnormalities. NECK: normal, supple, no lymphadenopathy,  no carotid bruits  PULM: clear to auscultation bilaterally- no wheezes, rales or rhonchi, decreased (chronic) air movement, no respiratory distress  COR: regular rate & rhythm, no murmurs and no gallops  ABD:  soft, non-tender, non-distended, normal bowel sounds, no masses or organomegaly  EXT:   no cyanosis, clubbing or edema present    NEURO: negative  SKIN:  no rashes or significant lesions  -----------------------------------------------------------------          Hospital Course: The patient was admitted for the above. He was treated with resuming his home meds, O2 and treatments and improved over the course of his hospitalization. Consultants:    · none    Procedures:    · none    Complications:   · none    Significant Diagnostic Studies:   Xr Chest Standard (2 Vw)    Result Date: 9/29/2018  EXAMINATION: TWO VIEWS OF THE CHEST 9/29/2018 5:36 pm COMPARISON: Radiograph 08/09/2018 HISTORY: ORDERING SYSTEM PROVIDED HISTORY: Dyspnea TECHNOLOGIST PROVIDED HISTORY: Dyspnea FINDINGS: Cardiomediastinal silhouette is within normal limits. No pneumothorax or effusion. Hyperexpanded lungs. No focal consolidation. No acute osseous abnormality. COPD without acute disease.      Recent Results (from the past 96 hour(s))   CBC Auto Differential    Collection Time: 09/29/18  4:50 PM   Result Value Ref Range    WBC 15.8 (H) 3.5 - 11.3 k/uL    RBC 4.69 4.21 - 5.77 m/uL    Hemoglobin 15.7 13.0 - 17.0 g/dL    Hematocrit 49.1 40.7 - 50.3 %    .7 (H) 82.6 - 102.9 fL    MCH 33.5 25.2 - 33.5 pg    MCHC 32.0 28.4 - 34.8 g/dL    RDW 12.4 11.8 - 14.4 %    Platelets 305 600 - 590 k/uL    MPV 9.7 8.1 - 13.5 fL    NRBC Automated 0.0 0.0 per 100 WBC    Differential Type NOT REPORTED     Seg Neutrophils 79 (H) 36 - 65 %    Lymphocytes 13 (L) 24 - 43 %    Monocytes 7 3 - 12 %    Eosinophils % 0 (L) 1 - 4 %    Basophils 0 0 - 2 %    Immature Granulocytes 1 (H) 0 %    Segs Absolute 12.39 (H) 1.50 - 8.10 k/uL    Absolute Lymph # 2.11 1.10 - 3.70 k/uL    Absolute Mono # 1.08 0.10 - 1.20 k/uL    Absolute Eos # 0.03 0.00 - 0.44 k/uL    Basophils # 0.06 0.00 - 0.20 k/uL    Absolute Immature Granulocyte 0.08 0.00 - 0.30 k/uL    WBC Morphology NOT REPORTED     RBC Morphology NOT REPORTED     Platelet Estimate NOT REPORTED    Comprehensive Metabolic Panel    Collection Time: 09/29/18  4:50 PM   Result Value Ref Range    Glucose 135 (H) 70 - 99 mg/dL    BUN 28 (H) 6 - 20 mg/dL    CREATININE 0.66 (L) 0.70 - 1.20 mg/dL    Bun/Cre Ratio 42 (H) 9 - 20    Calcium 10.4 8.6 - 10.4 mg/dL    Sodium 143 135 - 144 mmol/L    Potassium 4.1 3.7 - 5.3 mmol/L    Chloride 91 (L) 98 - 107 mmol/L    CO2 40 (H) 20 - 31 mmol/L    Anion Gap 12 9 - 17 mmol/L    Alkaline Phosphatase 93 40 - 129 U/L    ALT 26 5 - 41 U/L    AST 33 <40 U/L    Total Bilirubin 0.52 0.3 - 1.2 mg/dL    Total Protein 7.4 6.4 - 8.3 g/dL    Alb 4.6 3.5 - 5.2 g/dL    Albumin/Globulin Ratio 1.6 1.0 - 2.5    GFR Non-African American >60 >60 mL/min    GFR African American >60 >60 mL/min    GFR Comment          GFR Staging         Troponin    Collection Time: 09/29/18  4:50 PM   Result Value Ref Range    Troponin T <0.03 <0.03 ng/mL    Troponin Interp         Lactic acid, plasma    Collection Time: 09/29/18  5:20 PM   Result Value Ref Range    Lactic Acid 1.8 0.5 - 2.2 mmol/L    Lactic Acid, Whole Blood NOT REPORTED 0.7 - 2.1 mmol/L   EKG 12 Lead    Collection Time: 09/29/18  5:20 PM   Result Value Ref Range    Ventricular Rate 132 BPM    Atrial Rate 132 BPM    P-R Interval 134 ms    QRS Duration 76 ms    Q-T Interval 290 ms    QTc Calculation (Bazett) 429 ms    P Axis 84 degrees    R Axis -60 degrees    T Axis 79 degrees   CBC auto differential    Collection Time: 09/30/18  5:15 AM   Result Value Ref Range    WBC 7.2 3.5 - 11.3 k/uL    RBC 4.13 (L) 4.21 - 5.77 m/uL    Hemoglobin 13.8 13.0 - 17.0 g/dL    Hematocrit 43.0 40.7 - 50.3 %    .1 (H) 82.6 - 102.9 fL    MCH 33.4 25.2 - 33.5 pg    MCHC 32.1 28.4 - 34.8 g/dL    RDW 12.7 11.8 - 14.4 %    Platelets 272 657 - 136 k/uL    MPV 9.7 8.1 - 13.5 fL    NRBC Automated 0.0 0.0 per 100 WBC    Differential Type NOT REPORTED     WBC Morphology NOT REPORTED     RBC Morphology NOT REPORTED     Platelet Estimate NOT REPORTED     Seg Neutrophils 76 (H) 36 - 65 %    Lymphocytes 17 (L) 24 - 43 %    Monocytes 6 3 - 12 %    Eosinophils % 0 (L) 1 - 4 %    Basophils 0 0 - 2 %    Immature Granulocytes 0 0 %    Segs Absolute 5.47 1.50 - 8.10 k/uL    Absolute Lymph # 1.22 1.10 - 3.70 k/uL    Absolute Mono # 0.44 0.10 - 1.20 k/uL    Absolute Eos # <0.03 0.00 - 0.44 k/uL    Basophils # <0.03 0.00 - 0.20 k/uL    Absolute Immature Granulocyte <0.03 0.00 - 0.30 k/uL     ·     Discharge Condition:   · stable    Disposition:   · home    Discharge Medications:     Kaylan Guzman   Home Medication Instructions XBU:[id number]    Printed on:10/01/18 0364   Medication Information                      albuterol (PROVENTIL) (2.5 MG/3ML) 0.083% nebulizer solution  Take 3 mLs by nebulization every 6 hours as needed for Wheezing             ALPRAZolam (XANAX) 0.5 MG tablet  Take 1 tablet by mouth 2 times daily as needed for Anxiety for up to 90 days. Viridiana Aldana              aluminum & magnesium hydroxide-simethicone (MAALOX) 200-200-20 MG/5ML SUSP suspension  Take 15 mLs by mouth every 6 hours as needed for Indigestion             apixaban (ELIQUIS) 5 MG TABS tablet  Take 1 tablet by mouth 2 times daily             cefUROXime (CEFTIN) 500 MG tablet  Take 1 tablet by mouth 2 times daily for 7 days             citalopram (CELEXA) 20 MG tablet  Take 1 tablet by mouth daily             diltiazem (CARTIA XT) 180 MG extended release capsule  Take 1 capsule by mouth daily             INCRUSE ELLIPTA 62.5 MCG/INH AEPB  INHALE ONE PUFF INTO THE LUNGS ONCE DAILY             ipratropium (ATROVENT) 0.02 % nebulizer solution  Take 2.5 mLs by nebulization 4 times daily             loperamide (IMODIUM) 2 MG capsule  Take 2 mg by mouth 4 times daily as needed for Diarrhea             losartan (COZAAR) 100 MG tablet  Take 0.5 tablets by mouth daily             magnesium hydroxide (MILK OF MAGNESIA) 400 MG/5ML suspension  Take by mouth daily as needed for Constipation             montelukast (SINGULAIR) 10 MG tablet  Take 1 tablet by mouth daily             nicotine (NICODERM CQ) 21 MG/24HR  Place 1 patch onto the skin every 24 hours             potassium chloride (KLOR-CON M) 10 MEQ extended release tablet  Take 2 tablets by mouth daily             predniSONE (DELTASONE) 10 MG tablet  4 tabs daily for 3 days, 3 tabs daily for 3 days, 2 tabs daily

## 2018-10-01 NOTE — PROGRESS NOTES
Facsimile Transmission Cover Sheet    Information contained in this transmission is for the sole use of the intended recipients and may contain confidential and privileged information. Any unauthorized review, use, disclosure or distribution is prohibited. If you are not the intended recipient, please contact the sender and destroy all copies of the original message. Disclosure is made for the purpose of healthcare operations and continuity of care. To: __Dr. Moya________________________    From: [facility]    Fax Number: [phone]    Sender:JAMES Marcano RN___ Phone Number:_[phone]__________      This request is: Urgent - Yes    Information and/or questions regarding patient condition:    Patients feels he would benefit from [address]. Can we have an order for [address] and OT please? Thank you! Electronically signed by Natan Abad RN on 10/1/2018 at 11:57 AM                  Physician Signature, Date and Time needed for any orders written on this fax. Thank you.     Signature_____________________ Date __________ Time _______

## 2018-10-04 LAB
CULTURE: NORMAL
CULTURE: NORMAL
Lab: NORMAL
Lab: NORMAL
SPECIMEN DESCRIPTION: NORMAL
SPECIMEN DESCRIPTION: NORMAL
STATUS: NORMAL
STATUS: NORMAL

## 2018-10-05 ENCOUNTER — OFFICE VISIT (OUTPATIENT)
Dept: PRIMARY CARE CLINIC | Age: 53
End: 2018-10-05
Payer: MEDICARE

## 2018-10-05 VITALS
BODY MASS INDEX: 16.7 KG/M2 | SYSTOLIC BLOOD PRESSURE: 114 MMHG | WEIGHT: 97.8 LBS | DIASTOLIC BLOOD PRESSURE: 68 MMHG | TEMPERATURE: 98.3 F | HEART RATE: 112 BPM | HEIGHT: 64 IN | RESPIRATION RATE: 16 BRPM

## 2018-10-05 DIAGNOSIS — I48.0 PAROXYSMAL ATRIAL FIBRILLATION (HCC): ICD-10-CM

## 2018-10-05 DIAGNOSIS — F41.9 ANXIETY: Primary | ICD-10-CM

## 2018-10-05 DIAGNOSIS — Z12.11 SCREENING FOR COLON CANCER: Primary | ICD-10-CM

## 2018-10-05 DIAGNOSIS — Z23 NEED FOR INFLUENZA VACCINATION: ICD-10-CM

## 2018-10-05 DIAGNOSIS — J44.9 COPD, SEVERE (HCC): Chronic | ICD-10-CM

## 2018-10-05 DIAGNOSIS — E43 SEVERE MALNUTRITION (HCC): Chronic | ICD-10-CM

## 2018-10-05 DIAGNOSIS — I48.91 ATRIAL FIBRILLATION WITH RVR (HCC): ICD-10-CM

## 2018-10-05 PROCEDURE — 4004F PT TOBACCO SCREEN RCVD TLK: CPT | Performed by: NURSE PRACTITIONER

## 2018-10-05 PROCEDURE — 3017F COLORECTAL CA SCREEN DOC REV: CPT | Performed by: NURSE PRACTITIONER

## 2018-10-05 PROCEDURE — G0008 ADMIN INFLUENZA VIRUS VAC: HCPCS | Performed by: NURSE PRACTITIONER

## 2018-10-05 PROCEDURE — G8427 DOCREV CUR MEDS BY ELIG CLIN: HCPCS | Performed by: NURSE PRACTITIONER

## 2018-10-05 PROCEDURE — G8482 FLU IMMUNIZE ORDER/ADMIN: HCPCS | Performed by: NURSE PRACTITIONER

## 2018-10-05 PROCEDURE — G8926 SPIRO NO PERF OR DOC: HCPCS | Performed by: NURSE PRACTITIONER

## 2018-10-05 PROCEDURE — 3023F SPIROM DOC REV: CPT | Performed by: NURSE PRACTITIONER

## 2018-10-05 PROCEDURE — 99214 OFFICE O/P EST MOD 30 MIN: CPT | Performed by: NURSE PRACTITIONER

## 2018-10-05 PROCEDURE — 1111F DSCHRG MED/CURRENT MED MERGE: CPT | Performed by: NURSE PRACTITIONER

## 2018-10-05 PROCEDURE — G8419 CALC BMI OUT NRM PARAM NOF/U: HCPCS | Performed by: NURSE PRACTITIONER

## 2018-10-05 PROCEDURE — 90686 IIV4 VACC NO PRSV 0.5 ML IM: CPT | Performed by: NURSE PRACTITIONER

## 2018-10-05 RX ORDER — ALPRAZOLAM 0.5 MG/1
0.5 TABLET ORAL 2 TIMES DAILY PRN
Qty: 60 TABLET | Refills: 2 | Status: ON HOLD | OUTPATIENT
Start: 2018-10-05 | End: 2018-10-15

## 2018-10-05 RX ORDER — CITALOPRAM 40 MG/1
40 TABLET ORAL DAILY
Qty: 90 TABLET | Refills: 3 | Status: SHIPPED | OUTPATIENT
Start: 2018-10-05 | End: 2019-01-22 | Stop reason: CLARIF

## 2018-10-05 ASSESSMENT — ENCOUNTER SYMPTOMS
WHEEZING: 0
HEMOPTYSIS: 0
COUGH: 1
VOMITING: 0
SORE THROAT: 0
CHEST TIGHTNESS: 0
SPUTUM PRODUCTION: 1
DIFFICULTY BREATHING: 0
HOARSE VOICE: 0
SHORTNESS OF BREATH: 1
FREQUENT THROAT CLEARING: 0
NAUSEA: 0
TROUBLE SWALLOWING: 0

## 2018-10-05 ASSESSMENT — COPD QUESTIONNAIRES: COPD: 1

## 2018-10-05 NOTE — PATIENT INSTRUCTIONS
pneumococcal vaccine (PCV13) and/or DTaP vaccine at the same time might be slightly more likely to have a seizure caused by fever. Ask your doctor for more information. Tell your doctor if a child who is getting flu vaccine has ever had a seizure  Problems that could happen after any injected vaccine:  · People sometimes faint after a medical procedure, including vaccination. Sitting or lying down for about 15 minutes can help prevent fainting, and injuries caused by a fall. Tell your doctor if you feel dizzy, or have vision changes or ringing in the ears. · Some people get severe pain in the shoulder and have difficulty moving the arm where a shot was given. This happens very rarely. · Any medication can cause a severe allergic reaction. Such reactions from a vaccine are very rare, estimated at about 1 in a million doses, and would happen within a few minutes to a few hours after the vaccination. As with any medicine, there is a very remote chance of a vaccine causing a serious injury or death. The safety of vaccines is always being monitored. For more information, visit: www.cdc.gov/vaccinesafety/. What if there is a serious reaction? What should I look for? · Look for anything that concerns you, such as signs of a severe allergic reaction, very high fever, or unusual behavior. Signs of a severe allergic reaction can include hives, swelling of the face and throat, difficulty breathing, a fast heartbeat, dizziness, and weakness - usually within a few minutes to a few hours after the vaccination. What should I do? · If you think it is a severe allergic reaction or other emergency that can't wait, call 9-1-1 and get the person to the nearest hospital. Otherwise, call your doctor. · Reactions should be reported to the \"Vaccine Adverse Event Reporting System\" (VAERS). Your doctor should file this report, or you can do it yourself through the VAERS website at www.vaers. [facility] [place].gov, or by calling 9-427-501-295-739-0862. [facility] does not give medical advice. The National Vaccine Injury Compensation Program  The National Vaccine Injury Compensation Program (VICP) is a federal program that was created to compensate people who may have been injured by certain vaccines. Persons who believe they may have been injured by a vaccine can learn about the program and about filing a claim by calling 3-409.558.5045 or visiting the retickr website at www.[facility].gov/vaccinecompensation. There is a time limit to file a claim for compensation. How can I learn more? · Ask your healthcare provider. He or she can give you the vaccine package insert or suggest other sources of information. · Call your local or state health department. · Contact the Centers for Disease Control and Prevention (CDC):  ¨ Call 6-116.243.1433 (1-800-CDC-INFO) or  ¨ Visit CDC's website at www.cdc.gov/flu  Vaccine Information Statement  Inactivated Influenza Vaccine  8/7/2015)  [address] 015IS-44  Department of Health and Human Services  Centers for Disease Control and Prevention  Many Vaccine Information Statements are available in [place] and other languages. See www.immunize.org/vis. Muchas hojas de información sobre vacunas están disponibles en español y en otros idiomas. Visite www.immunize.org/vis. Care instructions adapted under license by [facility] ([facility]). If you have questions about a medical condition or this instruction, always ask your healthcare professional. [place] [address] any warranty or liability for your use of this information.

## 2018-10-05 NOTE — PROGRESS NOTES
QUADV, 3 YRS AND OLDER, IM, PF, PREFILL SYR OR SDV, 0.5ML (FLUZONE QUADV, PF)       1. Pegn received counseling on the following healthy behaviors: nutrition, exercise and medication adherence  2. Patient given educational materials - see patient instructions  3. Was a self-tracking handout given in paper form or via [employer]? No  If yes, see orders or list here. 4.  Discussed use, benefit, and side effects of prescribed medications. Barriers to medication compliance addressed. All patient questions answered. Pt voiced understanding. 5.  Reviewed prior labs and health maintenance  6. Continue current medications, diet and exercise. Completed Refills   Requested Prescriptions     Signed Prescriptions Disp Refills    citalopram (CELEXA) 40 MG tablet 90 tablet 3     Sig: Take 1 tablet by mouth daily    ALPRAZolam (XANAX) 0.5 MG tablet 60 tablet 2     Sig: Take 1 tablet by mouth 2 times daily as needed for Anxiety for up to 90 days. .    CPAP Machine MISC 1 each 0     Sig: by Does not apply route USE AS DIRECTED    Nutritional Supplements (ENSURE COMPLETE) LIQD 60 Bottle 5     Sig: Take 2 Bottles by mouth daily         Return in about 6 months (around 4/5/2019) for CHECK UP. Quality & Risk Score Accuracy    Visit Dx:  I48.0 - Paroxysmal atrial fibrillation (HCC)  Assessment and plan:  Stable based upon symptoms and exam. Continue current treatment plan and follow up at least yearly. Visit Dx:  O77.20 - Atrial fibrillation with RVR (HCC)  Assessment and plan:  Stable based upon symptoms and exam. Continue current treatment plan and follow up at least yearly. Last edited 10/05/18 13:22 EDT by ABHIJEET Cross - CNP           Quality & Risk Score Accuracy    Visit Dx:  I48.0 - Paroxysmal atrial fibrillation (HCC)  Assessment and plan:  Stable based upon symptoms and exam. Continue current treatment plan and follow up at least yearly.   Visit Dx:  X95.11 - Atrial fibrillation with RVR ([address]  Assessment and plan:  Stable based upon symptoms and exam. Continue current treatment plan and follow up at least yearly.   Last edited 10/05/18 13:22 EDT by Payton Buerger, APRN - CNP

## 2018-10-08 ENCOUNTER — HOSPITAL ENCOUNTER (INPATIENT)
Age: 53
LOS: 7 days | Discharge: NURSING, REHAB OR LONG-TERM CARE | DRG: 190 | End: 2018-10-15
Attending: EMERGENCY MEDICINE | Admitting: INTERNAL MEDICINE
Payer: MEDICARE

## 2018-10-08 ENCOUNTER — APPOINTMENT (OUTPATIENT)
Dept: GENERAL RADIOLOGY | Age: 53
DRG: 190 | End: 2018-10-08
Payer: MEDICARE

## 2018-10-08 DIAGNOSIS — S22.32XA CLOSED FRACTURE OF ONE RIB OF LEFT SIDE, INITIAL ENCOUNTER: ICD-10-CM

## 2018-10-08 DIAGNOSIS — E44.0 MODERATE MALNUTRITION (HCC): Chronic | ICD-10-CM

## 2018-10-08 DIAGNOSIS — R06.89 HYPERCAPNIA: Primary | ICD-10-CM

## 2018-10-08 DIAGNOSIS — R07.9 CHEST PAIN, UNSPECIFIED TYPE: ICD-10-CM

## 2018-10-08 DIAGNOSIS — J96.21 ACUTE ON CHRONIC RESPIRATORY FAILURE WITH HYPOXIA AND HYPERCAPNIA (HCC): ICD-10-CM

## 2018-10-08 DIAGNOSIS — J96.22 ACUTE ON CHRONIC RESPIRATORY FAILURE WITH HYPOXIA AND HYPERCAPNIA (HCC): ICD-10-CM

## 2018-10-08 DIAGNOSIS — F41.9 ANXIETY: ICD-10-CM

## 2018-10-08 PROBLEM — F10.10 ETOH ABUSE: Chronic | Status: ACTIVE | Noted: 2018-08-15

## 2018-10-08 LAB
ALLEN TEST: ABNORMAL
ALLEN TEST: ABNORMAL
ANION GAP SERPL CALCULATED.3IONS-SCNC: ABNORMAL MMOL/L (ref 9–17)
BNP INTERPRETATION: ABNORMAL
BUN BLDV-MCNC: 24 MG/DL (ref 6–20)
BUN/CREAT BLD: 53 (ref 9–20)
CALCIUM SERPL-MCNC: 9.4 MG/DL (ref 8.6–10.4)
CARBOXYHEMOGLOBIN: ABNORMAL % (ref 0–5)
CARBOXYHEMOGLOBIN: ABNORMAL % (ref 0–5)
CHLORIDE BLD-SCNC: 93 MMOL/L (ref 98–107)
CO2: >45 MMOL/L (ref 20–31)
CREAT SERPL-MCNC: 0.45 MG/DL (ref 0.7–1.2)
EKG ATRIAL RATE: 106 BPM
EKG ATRIAL RATE: 89 BPM
EKG P AXIS: 82 DEGREES
EKG P AXIS: 85 DEGREES
EKG P-R INTERVAL: 124 MS
EKG P-R INTERVAL: 134 MS
EKG Q-T INTERVAL: 312 MS
EKG Q-T INTERVAL: 340 MS
EKG QRS DURATION: 76 MS
EKG QRS DURATION: 78 MS
EKG QTC CALCULATION (BAZETT): 413 MS
EKG QTC CALCULATION (BAZETT): 414 MS
EKG R AXIS: -42 DEGREES
EKG R AXIS: -47 DEGREES
EKG T AXIS: 78 DEGREES
EKG T AXIS: 85 DEGREES
EKG VENTRICULAR RATE: 106 BPM
EKG VENTRICULAR RATE: 89 BPM
FIO2: ABNORMAL
FIO2: ABNORMAL
GFR AFRICAN AMERICAN: >60 ML/MIN
GFR NON-AFRICAN AMERICAN: >60 ML/MIN
GFR SERPL CREATININE-BSD FRML MDRD: ABNORMAL ML/MIN/{1.73_M2}
GFR SERPL CREATININE-BSD FRML MDRD: ABNORMAL ML/MIN/{1.73_M2}
GLUCOSE BLD-MCNC: 142 MG/DL (ref 70–99)
HCO3 VENOUS: 53.9 MMOL/L (ref 24–30)
HCO3 VENOUS: 57.3 MMOL/L (ref 24–30)
HCT VFR BLD CALC: 42.8 % (ref 40.7–50.3)
HEMOGLOBIN: 13.4 G/DL (ref 13–17)
MCH RBC QN AUTO: 33.7 PG (ref 25.2–33.5)
MCHC RBC AUTO-ENTMCNC: 31.3 G/DL (ref 28.4–34.8)
MCV RBC AUTO: 107.5 FL (ref 82.6–102.9)
METHEMOGLOBIN: ABNORMAL % (ref 0–1.9)
METHEMOGLOBIN: ABNORMAL % (ref 0–1.9)
MODE: ABNORMAL
MODE: ABNORMAL
NEGATIVE BASE EXCESS, VEN: ABNORMAL MMOL/L (ref 0–2)
NEGATIVE BASE EXCESS, VEN: ABNORMAL MMOL/L (ref 0–2)
NOTIFICATION TIME: ABNORMAL
NOTIFICATION TIME: ABNORMAL
NOTIFICATION: ABNORMAL
NOTIFICATION: ABNORMAL
NRBC AUTOMATED: 0 PER 100 WBC
O2 DEVICE/FLOW/%: ABNORMAL
O2 DEVICE/FLOW/%: ABNORMAL
O2 SAT, VEN: 50.5 % (ref 60–85)
O2 SAT, VEN: 67.6 % (ref 60–85)
OXYHEMOGLOBIN: ABNORMAL % (ref 95–98)
OXYHEMOGLOBIN: ABNORMAL % (ref 95–98)
PATIENT TEMP: 37
PATIENT TEMP: ABNORMAL
PCO2, VEN, TEMP ADJ: ABNORMAL MMHG (ref 39–55)
PCO2, VEN, TEMP ADJ: ABNORMAL MMHG (ref 39–55)
PCO2, VEN: 115.7 (ref 39–55)
PCO2, VEN: 136.1 (ref 39–55)
PDW BLD-RTO: 13.1 % (ref 11.8–14.4)
PEEP/CPAP: ABNORMAL
PEEP/CPAP: ABNORMAL
PH VENOUS: 7.24 (ref 7.32–7.42)
PH VENOUS: 7.29 (ref 7.32–7.42)
PH, VEN, TEMP ADJ: ABNORMAL (ref 7.32–7.42)
PH, VEN, TEMP ADJ: ABNORMAL (ref 7.32–7.42)
PLATELET # BLD: 195 K/UL (ref 138–453)
PMV BLD AUTO: 9.6 FL (ref 8.1–13.5)
PO2, VEN, TEMP ADJ: ABNORMAL MMHG (ref 30–50)
PO2, VEN, TEMP ADJ: ABNORMAL MMHG (ref 30–50)
PO2, VEN: 32.7 (ref 30–50)
PO2, VEN: 44.8 (ref 30–50)
POSITIVE BASE EXCESS, VEN: 20.4 MMOL/L (ref 0–2)
POSITIVE BASE EXCESS, VEN: 22 MMOL/L (ref 0–2)
POTASSIUM SERPL-SCNC: 4.3 MMOL/L (ref 3.7–5.3)
PRO-BNP: 815 PG/ML
PSV: ABNORMAL
PSV: ABNORMAL
PT. POSITION: ABNORMAL
PT. POSITION: ABNORMAL
RBC # BLD: 3.98 M/UL (ref 4.21–5.77)
RESPIRATORY RATE: ABNORMAL
RESPIRATORY RATE: ABNORMAL
SAMPLE SITE: ABNORMAL
SAMPLE SITE: ABNORMAL
SET RATE: ABNORMAL
SET RATE: ABNORMAL
SODIUM BLD-SCNC: 144 MMOL/L (ref 135–144)
TEXT FOR RESPIRATORY: ABNORMAL
TEXT FOR RESPIRATORY: ABNORMAL
TOTAL HB: ABNORMAL G/DL (ref 12–16)
TOTAL HB: ABNORMAL G/DL (ref 12–16)
TOTAL RATE: ABNORMAL
TOTAL RATE: ABNORMAL
TROPONIN INTERP: NORMAL
TROPONIN INTERP: NORMAL
TROPONIN T: <0.03 NG/ML
TROPONIN T: <0.03 NG/ML
VT: ABNORMAL
VT: ABNORMAL
WBC # BLD: 9.5 K/UL (ref 3.5–11.3)

## 2018-10-08 PROCEDURE — 6370000000 HC RX 637 (ALT 250 FOR IP): Performed by: INTERNAL MEDICINE

## 2018-10-08 PROCEDURE — 36415 COLL VENOUS BLD VENIPUNCTURE: CPT

## 2018-10-08 PROCEDURE — 93005 ELECTROCARDIOGRAM TRACING: CPT

## 2018-10-08 PROCEDURE — 2580000003 HC RX 258: Performed by: EMERGENCY MEDICINE

## 2018-10-08 PROCEDURE — 2000000000 HC ICU R&B

## 2018-10-08 PROCEDURE — 80048 BASIC METABOLIC PNL TOTAL CA: CPT

## 2018-10-08 PROCEDURE — 82805 BLOOD GASES W/O2 SATURATION: CPT

## 2018-10-08 PROCEDURE — 87040 BLOOD CULTURE FOR BACTERIA: CPT

## 2018-10-08 PROCEDURE — 94664 DEMO&/EVAL PT USE INHALER: CPT

## 2018-10-08 PROCEDURE — 85027 COMPLETE CBC AUTOMATED: CPT

## 2018-10-08 PROCEDURE — 71046 X-RAY EXAM CHEST 2 VIEWS: CPT

## 2018-10-08 PROCEDURE — 99285 EMERGENCY DEPT VISIT HI MDM: CPT

## 2018-10-08 PROCEDURE — 84484 ASSAY OF TROPONIN QUANT: CPT

## 2018-10-08 PROCEDURE — 94660 CPAP INITIATION&MGMT: CPT

## 2018-10-08 PROCEDURE — 6360000002 HC RX W HCPCS: Performed by: INTERNAL MEDICINE

## 2018-10-08 PROCEDURE — 94640 AIRWAY INHALATION TREATMENT: CPT

## 2018-10-08 PROCEDURE — 2580000003 HC RX 258: Performed by: INTERNAL MEDICINE

## 2018-10-08 PROCEDURE — 83880 ASSAY OF NATRIURETIC PEPTIDE: CPT

## 2018-10-08 RX ORDER — ACETAMINOPHEN 325 MG/1
650 TABLET ORAL EVERY 4 HOURS PRN
Status: DISCONTINUED | OUTPATIENT
Start: 2018-10-08 | End: 2018-10-15 | Stop reason: HOSPADM

## 2018-10-08 RX ORDER — MAGNESIUM HYDROXIDE/ALUMINUM HYDROXICE/SIMETHICONE 120; 1200; 1200 MG/30ML; MG/30ML; MG/30ML
15 SUSPENSION ORAL EVERY 6 HOURS PRN
Status: DISCONTINUED | OUTPATIENT
Start: 2018-10-08 | End: 2018-10-15 | Stop reason: HOSPADM

## 2018-10-08 RX ORDER — SODIUM CHLORIDE 0.9 % (FLUSH) 0.9 %
10 SYRINGE (ML) INJECTION EVERY 12 HOURS SCHEDULED
Status: DISCONTINUED | OUTPATIENT
Start: 2018-10-08 | End: 2018-10-15 | Stop reason: HOSPADM

## 2018-10-08 RX ORDER — IPRATROPIUM BROMIDE AND ALBUTEROL SULFATE 2.5; .5 MG/3ML; MG/3ML
1 SOLUTION RESPIRATORY (INHALATION)
Status: DISCONTINUED | OUTPATIENT
Start: 2018-10-08 | End: 2018-10-08

## 2018-10-08 RX ORDER — ALPRAZOLAM 0.5 MG/1
0.5 TABLET ORAL 2 TIMES DAILY PRN
Status: DISCONTINUED | OUTPATIENT
Start: 2018-10-08 | End: 2018-10-15 | Stop reason: HOSPADM

## 2018-10-08 RX ORDER — SODIUM CHLORIDE 9 MG/ML
INJECTION, SOLUTION INTRAVENOUS CONTINUOUS
Status: DISCONTINUED | OUTPATIENT
Start: 2018-10-08 | End: 2018-10-13

## 2018-10-08 RX ORDER — LOPERAMIDE HYDROCHLORIDE 2 MG/1
2 CAPSULE ORAL 4 TIMES DAILY PRN
Status: DISCONTINUED | OUTPATIENT
Start: 2018-10-08 | End: 2018-10-15 | Stop reason: HOSPADM

## 2018-10-08 RX ORDER — 0.9 % SODIUM CHLORIDE 0.9 %
1000 INTRAVENOUS SOLUTION INTRAVENOUS ONCE
Status: COMPLETED | OUTPATIENT
Start: 2018-10-08 | End: 2018-10-08

## 2018-10-08 RX ORDER — CITALOPRAM 20 MG/1
40 TABLET ORAL DAILY
Status: DISCONTINUED | OUTPATIENT
Start: 2018-10-08 | End: 2018-10-15 | Stop reason: HOSPADM

## 2018-10-08 RX ORDER — DOXYCYCLINE HYCLATE 100 MG/1
100 CAPSULE ORAL EVERY 12 HOURS
Status: DISCONTINUED | OUTPATIENT
Start: 2018-10-08 | End: 2018-10-15 | Stop reason: HOSPADM

## 2018-10-08 RX ORDER — METHYLPREDNISOLONE SODIUM SUCCINATE 125 MG/2ML
60 INJECTION, POWDER, LYOPHILIZED, FOR SOLUTION INTRAMUSCULAR; INTRAVENOUS EVERY 12 HOURS
Status: DISCONTINUED | OUTPATIENT
Start: 2018-10-08 | End: 2018-10-15 | Stop reason: HOSPADM

## 2018-10-08 RX ORDER — FAMOTIDINE 20 MG/1
20 TABLET, FILM COATED ORAL 2 TIMES DAILY
Status: DISCONTINUED | OUTPATIENT
Start: 2018-10-08 | End: 2018-10-15 | Stop reason: HOSPADM

## 2018-10-08 RX ORDER — ALBUTEROL SULFATE 2.5 MG/3ML
2.5 SOLUTION RESPIRATORY (INHALATION) EVERY 4 HOURS PRN
Status: DISCONTINUED | OUTPATIENT
Start: 2018-10-08 | End: 2018-10-15 | Stop reason: HOSPADM

## 2018-10-08 RX ORDER — ALBUTEROL SULFATE 2.5 MG/3ML
2.5 SOLUTION RESPIRATORY (INHALATION) EVERY 6 HOURS PRN
Status: DISCONTINUED | OUTPATIENT
Start: 2018-10-08 | End: 2018-10-08

## 2018-10-08 RX ORDER — ONDANSETRON 2 MG/ML
4 INJECTION INTRAMUSCULAR; INTRAVENOUS EVERY 6 HOURS PRN
Status: DISCONTINUED | OUTPATIENT
Start: 2018-10-08 | End: 2018-10-15 | Stop reason: HOSPADM

## 2018-10-08 RX ORDER — HYDROCODONE BITARTRATE AND ACETAMINOPHEN 5; 325 MG/1; MG/1
1 TABLET ORAL EVERY 6 HOURS PRN
Status: DISCONTINUED | OUTPATIENT
Start: 2018-10-08 | End: 2018-10-15 | Stop reason: HOSPADM

## 2018-10-08 RX ORDER — TAMSULOSIN HYDROCHLORIDE 0.4 MG/1
0.4 CAPSULE ORAL DAILY
Status: DISCONTINUED | OUTPATIENT
Start: 2018-10-08 | End: 2018-10-15 | Stop reason: HOSPADM

## 2018-10-08 RX ORDER — LOSARTAN POTASSIUM 50 MG/1
50 TABLET ORAL DAILY
Status: DISCONTINUED | OUTPATIENT
Start: 2018-10-08 | End: 2018-10-10

## 2018-10-08 RX ORDER — PSEUDOEPHEDRINE HCL 120 MG/1
120 TABLET, FILM COATED, EXTENDED RELEASE ORAL EVERY 12 HOURS
Status: DISCONTINUED | OUTPATIENT
Start: 2018-10-08 | End: 2018-10-15 | Stop reason: HOSPADM

## 2018-10-08 RX ORDER — CEFUROXIME AXETIL 250 MG/1
500 TABLET ORAL 2 TIMES DAILY
Status: DISCONTINUED | OUTPATIENT
Start: 2018-10-08 | End: 2018-10-15 | Stop reason: HOSPADM

## 2018-10-08 RX ORDER — POTASSIUM CHLORIDE 20 MEQ/1
20 TABLET, EXTENDED RELEASE ORAL DAILY
Status: DISCONTINUED | OUTPATIENT
Start: 2018-10-08 | End: 2018-10-15 | Stop reason: HOSPADM

## 2018-10-08 RX ORDER — NICOTINE 21 MG/24HR
1 PATCH, TRANSDERMAL 24 HOURS TRANSDERMAL EVERY 24 HOURS
Status: DISCONTINUED | OUTPATIENT
Start: 2018-10-08 | End: 2018-10-15 | Stop reason: HOSPADM

## 2018-10-08 RX ORDER — MONTELUKAST SODIUM 10 MG/1
10 TABLET ORAL DAILY
Status: DISCONTINUED | OUTPATIENT
Start: 2018-10-08 | End: 2018-10-15 | Stop reason: HOSPADM

## 2018-10-08 RX ORDER — SODIUM CHLORIDE 0.9 % (FLUSH) 0.9 %
10 SYRINGE (ML) INJECTION PRN
Status: DISCONTINUED | OUTPATIENT
Start: 2018-10-08 | End: 2018-10-15 | Stop reason: HOSPADM

## 2018-10-08 RX ORDER — DILTIAZEM HYDROCHLORIDE 180 MG/1
180 CAPSULE, COATED, EXTENDED RELEASE ORAL DAILY
Status: DISCONTINUED | OUTPATIENT
Start: 2018-10-08 | End: 2018-10-15 | Stop reason: HOSPADM

## 2018-10-08 RX ORDER — IPRATROPIUM BROMIDE AND ALBUTEROL SULFATE 2.5; .5 MG/3ML; MG/3ML
1 SOLUTION RESPIRATORY (INHALATION) 3 TIMES DAILY
Status: DISCONTINUED | OUTPATIENT
Start: 2018-10-08 | End: 2018-10-15 | Stop reason: HOSPADM

## 2018-10-08 RX ADMIN — SODIUM CHLORIDE 1000 ML: 9 INJECTION, SOLUTION INTRAVENOUS at 12:41

## 2018-10-08 RX ADMIN — ALPRAZOLAM 0.5 MG: 0.5 TABLET ORAL at 21:33

## 2018-10-08 RX ADMIN — SODIUM CHLORIDE: 9 INJECTION, SOLUTION INTRAVENOUS at 17:56

## 2018-10-08 RX ADMIN — APIXABAN 5 MG: 5 TABLET, FILM COATED ORAL at 20:41

## 2018-10-08 RX ADMIN — IPRATROPIUM BROMIDE AND ALBUTEROL SULFATE 1 AMPULE: .5; 3 SOLUTION RESPIRATORY (INHALATION) at 20:21

## 2018-10-08 RX ADMIN — CEFUROXIME AXETIL 500 MG: 250 TABLET ORAL at 20:41

## 2018-10-08 RX ADMIN — FAMOTIDINE 20 MG: 20 TABLET ORAL at 20:41

## 2018-10-08 RX ADMIN — MOMETASONE FUROATE AND FORMOTEROL FUMARATE DIHYDRATE 2 PUFF: 200; 5 AEROSOL RESPIRATORY (INHALATION) at 20:21

## 2018-10-08 RX ADMIN — DOXYCYCLINE HYCLATE 100 MG: 100 CAPSULE ORAL at 18:54

## 2018-10-08 RX ADMIN — PSEUDOEPHEDRINE HYDROCHLORIDE 120 MG: 120 TABLET, FILM COATED, EXTENDED RELEASE ORAL at 20:41

## 2018-10-08 RX ADMIN — MONTELUKAST SODIUM 10 MG: 10 TABLET, FILM COATED ORAL at 20:44

## 2018-10-08 RX ADMIN — METHYLPREDNISOLONE SODIUM SUCCINATE 60 MG: 125 INJECTION, POWDER, FOR SOLUTION INTRAMUSCULAR; INTRAVENOUS at 18:53

## 2018-10-08 ASSESSMENT — PAIN DESCRIPTION - ORIENTATION: ORIENTATION: LEFT

## 2018-10-08 ASSESSMENT — PAIN DESCRIPTION - PAIN TYPE
TYPE: ACUTE PAIN
TYPE: ACUTE PAIN

## 2018-10-08 ASSESSMENT — PAIN DESCRIPTION - ONSET: ONSET: ON-GOING

## 2018-10-08 ASSESSMENT — PAIN DESCRIPTION - FREQUENCY: FREQUENCY: CONTINUOUS

## 2018-10-08 ASSESSMENT — PAIN DESCRIPTION - LOCATION
LOCATION: CHEST
LOCATION: RIB CAGE

## 2018-10-08 ASSESSMENT — ENCOUNTER SYMPTOMS
VOMITING: 0
ABDOMINAL DISTENTION: 0
SORE THROAT: 0
CONSTIPATION: 0
WHEEZING: 0
SHORTNESS OF BREATH: 1
RHINORRHEA: 0
DIARRHEA: 0
NAUSEA: 0
COUGH: 1

## 2018-10-08 ASSESSMENT — PAIN SCALES - GENERAL
PAINLEVEL_OUTOF10: 9
PAINLEVEL_OUTOF10: 9

## 2018-10-08 ASSESSMENT — PAIN DESCRIPTION - DESCRIPTORS: DESCRIPTORS: JABBING

## 2018-10-08 ASSESSMENT — PAIN DESCRIPTION - PROGRESSION: CLINICAL_PROGRESSION: NOT CHANGED

## 2018-10-08 NOTE — ED PROVIDER NOTES
[facility] ED  Emergency Department        Pt Name: Lars Dunbar  MRN: [id number]  [place] 1965  Date of evaluation: 10/8/18    CHIEF COMPLAINT       Chief Complaint   Patient presents with    Chest Pain     X 1 week feels like someone is stomping on him    Shortness of Breath     whit chest pain       HISTORY OF PRESENT ILLNESS  (Location/Symptom, Timing/Onset, Context/Setting, Quality, Duration, Modifying Factors, Severity.)      Lars Dunbar is a 48 y.o. male who presents with Chest pain, shortness of breath. Patient reports he is just not feeling well, he last ate about 2 hours ago. He came in via EMS. Was recently admitted, is still taking his antibiotics he was discharged home with. He does have a history of COPD, is on 4 L home oxygen which she reports is compliant with, and compliant with his medications. He does have a history of paroxysmal atrial fibrillation, is on Eliquis     PAST MEDICAL / SURGICAL / SOCIAL / FAMILY HISTORY      has a past medical history of Arthritis; COPD (chronic obstructive pulmonary disease) ([address] Hypertension; Oxygen dependent; and Scoliosis. has a past surgical history that includes Tonsillectomy; other surgical history (Left, 04/13/2017); and Facial reconstruction surgery (Left, 4/13/2017). Social History     Social History    Marital status: Single     Spouse name: N/A    Number of children: N/A    Years of education: N/A     Occupational History    Not on file.      Social History Main Topics    Smoking status: Current Every Day Smoker     Packs/day: 0.10     Types: Cigarettes    Smokeless tobacco: Never Used      Comment: cutting down    Alcohol use No      Comment: last drink 6 months ago/48oz beer per day    Drug use: No      Comment: pt states \"every now and then\"    Sexual activity: Not on file     Other Topics Concern    Not on file     Social History Narrative    No narrative on file       Family History   Problem

## 2018-10-09 PROBLEM — E44.0 MODERATE MALNUTRITION (HCC): Chronic | Status: ACTIVE | Noted: 2018-10-09

## 2018-10-09 PROBLEM — F41.9 SEVERE ANXIETY: Chronic | Status: ACTIVE | Noted: 2018-08-15

## 2018-10-09 PROBLEM — E43 SEVERE MALNUTRITION (HCC): Chronic | Status: RESOLVED | Noted: 2018-06-11 | Resolved: 2018-10-09

## 2018-10-09 PROBLEM — E44.0 MODERATE MALNUTRITION (HCC): Status: ACTIVE | Noted: 2018-10-09

## 2018-10-09 LAB
ABSOLUTE EOS #: 0 K/UL (ref 0–0.44)
ABSOLUTE IMMATURE GRANULOCYTE: 0 K/UL (ref 0–0.3)
ABSOLUTE LYMPH #: 0.29 K/UL (ref 1.1–3.7)
ABSOLUTE MONO #: 0.08 K/UL (ref 0.1–1.2)
ALLEN TEST: ABNORMAL
ANION GAP SERPL CALCULATED.3IONS-SCNC: 2 MMOL/L (ref 9–17)
BASOPHILS # BLD: 0 % (ref 0–2)
BASOPHILS ABSOLUTE: 0 K/UL (ref 0–0.2)
BUN BLDV-MCNC: 15 MG/DL (ref 6–20)
BUN/CREAT BLD: 30 (ref 9–20)
CALCIUM SERPL-MCNC: 8.9 MG/DL (ref 8.6–10.4)
CARBOXYHEMOGLOBIN: ABNORMAL % (ref 0–5)
CHLORIDE BLD-SCNC: 95 MMOL/L (ref 98–107)
CO2: 43 MMOL/L (ref 20–31)
CREAT SERPL-MCNC: 0.5 MG/DL (ref 0.7–1.2)
DIFFERENTIAL TYPE: ABNORMAL
EOSINOPHILS RELATIVE PERCENT: 0 % (ref 1–4)
FIO2: ABNORMAL
GFR AFRICAN AMERICAN: >60 ML/MIN
GFR NON-AFRICAN AMERICAN: >60 ML/MIN
GFR SERPL CREATININE-BSD FRML MDRD: ABNORMAL ML/MIN/{1.73_M2}
GFR SERPL CREATININE-BSD FRML MDRD: ABNORMAL ML/MIN/{1.73_M2}
GLUCOSE BLD-MCNC: 166 MG/DL (ref 70–99)
HCO3 ARTERIAL: 35.1 MMOL/L (ref 22–26)
HCT VFR BLD CALC: 37 % (ref 40.7–50.3)
HEMOGLOBIN: 11.5 G/DL (ref 13–17)
IMMATURE GRANULOCYTES: 0 %
LYMPHOCYTES # BLD: 7 % (ref 24–43)
MCH RBC QN AUTO: 33.7 PG (ref 25.2–33.5)
MCHC RBC AUTO-ENTMCNC: 31.1 G/DL (ref 28.4–34.8)
MCV RBC AUTO: 108.5 FL (ref 82.6–102.9)
METHEMOGLOBIN: ABNORMAL % (ref 0–1.9)
MODE: ABNORMAL
MONOCYTES # BLD: 2 % (ref 3–12)
MORPHOLOGY: NORMAL
NEGATIVE BASE EXCESS, ART: ABNORMAL MMOL/L (ref 0–2)
NOTIFICATION TIME: ABNORMAL
NOTIFICATION: ABNORMAL
NRBC AUTOMATED: 0 PER 100 WBC
O2 DEVICE/FLOW/%: ABNORMAL
O2 SAT, ARTERIAL: 87.6 % (ref 95–98)
OXYHEMOGLOBIN: ABNORMAL % (ref 95–98)
PATIENT TEMP: 37
PCO2 ARTERIAL: 60.2 MMHG (ref 35–45)
PCO2, ART, TEMP ADJ: ABNORMAL
PDW BLD-RTO: 12.9 % (ref 11.8–14.4)
PEEP/CPAP: ABNORMAL
PH ARTERIAL: 7.38 (ref 7.35–7.45)
PH, ART, TEMP ADJ: ABNORMAL (ref 7.35–7.45)
PLATELET # BLD: 147 K/UL (ref 138–453)
PLATELET ESTIMATE: ABNORMAL
PMV BLD AUTO: 8.7 FL (ref 8.1–13.5)
PO2 ARTERIAL: 55.4 MMHG (ref 80–100)
PO2, ART, TEMP ADJ: ABNORMAL MMHG (ref 80–100)
POSITIVE BASE EXCESS, ART: 7.7 MMOL/L (ref 0–2)
POTASSIUM SERPL-SCNC: 4.4 MMOL/L (ref 3.7–5.3)
PSV: ABNORMAL
PT. POSITION: ABNORMAL
RBC # BLD: 3.41 M/UL (ref 4.21–5.77)
RBC # BLD: ABNORMAL 10*6/UL
RESPIRATORY RATE: ABNORMAL
SAMPLE SITE: ABNORMAL
SEG NEUTROPHILS: 91 % (ref 36–65)
SEGMENTED NEUTROPHILS ABSOLUTE COUNT: 3.83 K/UL (ref 1.5–8.1)
SET RATE: ABNORMAL
SODIUM BLD-SCNC: 140 MMOL/L (ref 135–144)
TEXT FOR RESPIRATORY: ABNORMAL
TOTAL HB: ABNORMAL G/DL (ref 12–16)
TOTAL RATE: ABNORMAL
VT: ABNORMAL
WBC # BLD: 4.2 K/UL (ref 3.5–11.3)
WBC # BLD: ABNORMAL 10*3/UL

## 2018-10-09 PROCEDURE — G8978 MOBILITY CURRENT STATUS: HCPCS

## 2018-10-09 PROCEDURE — 97166 OT EVAL MOD COMPLEX 45 MIN: CPT

## 2018-10-09 PROCEDURE — 6370000000 HC RX 637 (ALT 250 FOR IP): Performed by: INTERNAL MEDICINE

## 2018-10-09 PROCEDURE — 80048 BASIC METABOLIC PNL TOTAL CA: CPT

## 2018-10-09 PROCEDURE — 97162 PT EVAL MOD COMPLEX 30 MIN: CPT

## 2018-10-09 PROCEDURE — G8979 MOBILITY GOAL STATUS: HCPCS

## 2018-10-09 PROCEDURE — 94761 N-INVAS EAR/PLS OXIMETRY MLT: CPT

## 2018-10-09 PROCEDURE — 6360000002 HC RX W HCPCS: Performed by: INTERNAL MEDICINE

## 2018-10-09 PROCEDURE — 2580000003 HC RX 258: Performed by: INTERNAL MEDICINE

## 2018-10-09 PROCEDURE — 97530 THERAPEUTIC ACTIVITIES: CPT

## 2018-10-09 PROCEDURE — 36415 COLL VENOUS BLD VENIPUNCTURE: CPT

## 2018-10-09 PROCEDURE — 85025 COMPLETE CBC W/AUTO DIFF WBC: CPT

## 2018-10-09 PROCEDURE — G8987 SELF CARE CURRENT STATUS: HCPCS

## 2018-10-09 PROCEDURE — 2700000000 HC OXYGEN THERAPY PER DAY

## 2018-10-09 PROCEDURE — G8988 SELF CARE GOAL STATUS: HCPCS

## 2018-10-09 PROCEDURE — 94660 CPAP INITIATION&MGMT: CPT

## 2018-10-09 PROCEDURE — 2000000000 HC ICU R&B

## 2018-10-09 PROCEDURE — 82805 BLOOD GASES W/O2 SATURATION: CPT

## 2018-10-09 PROCEDURE — 94640 AIRWAY INHALATION TREATMENT: CPT

## 2018-10-09 RX ORDER — 0.9 % SODIUM CHLORIDE 0.9 %
500 INTRAVENOUS SOLUTION INTRAVENOUS ONCE
Status: COMPLETED | OUTPATIENT
Start: 2018-10-09 | End: 2018-10-09

## 2018-10-09 RX ADMIN — POTASSIUM CHLORIDE 20 MEQ: 20 TABLET, EXTENDED RELEASE ORAL at 08:24

## 2018-10-09 RX ADMIN — DOXYCYCLINE HYCLATE 100 MG: 100 CAPSULE ORAL at 17:17

## 2018-10-09 RX ADMIN — IPRATROPIUM BROMIDE AND ALBUTEROL SULFATE 1 AMPULE: .5; 3 SOLUTION RESPIRATORY (INHALATION) at 15:35

## 2018-10-09 RX ADMIN — FAMOTIDINE 20 MG: 20 TABLET ORAL at 08:23

## 2018-10-09 RX ADMIN — LOSARTAN POTASSIUM 50 MG: 50 TABLET ORAL at 08:24

## 2018-10-09 RX ADMIN — MONTELUKAST SODIUM 10 MG: 10 TABLET, FILM COATED ORAL at 08:23

## 2018-10-09 RX ADMIN — IPRATROPIUM BROMIDE AND ALBUTEROL SULFATE 1 AMPULE: .5; 3 SOLUTION RESPIRATORY (INHALATION) at 21:07

## 2018-10-09 RX ADMIN — SODIUM CHLORIDE: 9 INJECTION, SOLUTION INTRAVENOUS at 21:02

## 2018-10-09 RX ADMIN — METHYLPREDNISOLONE SODIUM SUCCINATE 60 MG: 125 INJECTION, POWDER, FOR SOLUTION INTRAMUSCULAR; INTRAVENOUS at 17:18

## 2018-10-09 RX ADMIN — IPRATROPIUM BROMIDE AND ALBUTEROL SULFATE 1 AMPULE: .5; 3 SOLUTION RESPIRATORY (INHALATION) at 10:30

## 2018-10-09 RX ADMIN — MOMETASONE FUROATE AND FORMOTEROL FUMARATE DIHYDRATE 2 PUFF: 200; 5 AEROSOL RESPIRATORY (INHALATION) at 10:33

## 2018-10-09 RX ADMIN — ALPRAZOLAM 0.5 MG: 0.5 TABLET ORAL at 11:41

## 2018-10-09 RX ADMIN — CEFUROXIME AXETIL 500 MG: 250 TABLET ORAL at 21:01

## 2018-10-09 RX ADMIN — TAMSULOSIN HYDROCHLORIDE 0.4 MG: 0.4 CAPSULE ORAL at 08:24

## 2018-10-09 RX ADMIN — FAMOTIDINE 20 MG: 20 TABLET ORAL at 21:01

## 2018-10-09 RX ADMIN — CEFUROXIME AXETIL 500 MG: 250 TABLET ORAL at 08:23

## 2018-10-09 RX ADMIN — PSEUDOEPHEDRINE HYDROCHLORIDE 120 MG: 120 TABLET, FILM COATED, EXTENDED RELEASE ORAL at 06:30

## 2018-10-09 RX ADMIN — SODIUM CHLORIDE 500 ML: 9 INJECTION, SOLUTION INTRAVENOUS at 17:15

## 2018-10-09 RX ADMIN — PSEUDOEPHEDRINE HYDROCHLORIDE 120 MG: 120 TABLET, FILM COATED, EXTENDED RELEASE ORAL at 17:17

## 2018-10-09 RX ADMIN — METHYLPREDNISOLONE SODIUM SUCCINATE 60 MG: 125 INJECTION, POWDER, FOR SOLUTION INTRAMUSCULAR; INTRAVENOUS at 05:18

## 2018-10-09 RX ADMIN — APIXABAN 5 MG: 5 TABLET, FILM COATED ORAL at 21:01

## 2018-10-09 RX ADMIN — HYDROCODONE BITARTRATE AND ACETAMINOPHEN 1 TABLET: 5; 325 TABLET ORAL at 21:01

## 2018-10-09 RX ADMIN — DOXYCYCLINE HYCLATE 100 MG: 100 CAPSULE ORAL at 05:18

## 2018-10-09 RX ADMIN — SODIUM CHLORIDE: 9 INJECTION, SOLUTION INTRAVENOUS at 06:53

## 2018-10-09 RX ADMIN — DILTIAZEM HYDROCHLORIDE 180 MG: 180 CAPSULE, COATED, EXTENDED RELEASE ORAL at 08:23

## 2018-10-09 RX ADMIN — MOMETASONE FUROATE AND FORMOTEROL FUMARATE DIHYDRATE 2 PUFF: 200; 5 AEROSOL RESPIRATORY (INHALATION) at 21:20

## 2018-10-09 RX ADMIN — APIXABAN 5 MG: 5 TABLET, FILM COATED ORAL at 08:24

## 2018-10-09 RX ADMIN — CITALOPRAM HYDROBROMIDE 40 MG: 20 TABLET ORAL at 08:24

## 2018-10-09 ASSESSMENT — PAIN DESCRIPTION - FREQUENCY: FREQUENCY: CONTINUOUS

## 2018-10-09 ASSESSMENT — PAIN DESCRIPTION - ORIENTATION: ORIENTATION: LEFT

## 2018-10-09 ASSESSMENT — PAIN DESCRIPTION - DESCRIPTORS: DESCRIPTORS: THROBBING

## 2018-10-09 ASSESSMENT — PAIN DESCRIPTION - LOCATION: LOCATION: RIB CAGE

## 2018-10-09 ASSESSMENT — PAIN SCALES - GENERAL
PAINLEVEL_OUTOF10: 8
PAINLEVEL_OUTOF10: 8

## 2018-10-09 ASSESSMENT — PAIN DESCRIPTION - PAIN TYPE: TYPE: ACUTE PAIN

## 2018-10-09 NOTE — PLAN OF CARE
Facsimile Transmission Cover Sheet    Information contained in this transmission is for the sole use of the intended recipients and may contain confidential and privileged information. Any unauthorized review, use, disclosure or distribution is prohibited. If you are not the intended recipient, please contact the sender and destroy all copies of the original message. Disclosure is made for the purpose of healthcare operations and continuity of care. To: __Dr. Gaming________________________    From: ICU    Fax Number: [phone]    Sender:_Clementine Waggoner RN_________________ Phone Number:_[phone]____________          Information and/or questions regarding patient condition:    In regards to Meme Reyes:     BP's running on lower side since 1300.    1300 BP:  80/63  HR: 109  1315 BP:  85/58  HR: 108  1330 BP:  84/62  HR:  108  1400 BP:  104/65  HR:  109  1430  BP:  87/62  HR:  104  1500  BP:  98/57  HR:  102    MAP remained above 65 for each of these and the patient was sound asleep from 9115-7473. Lungs remain unchanged, still diminished. Only tolerated Bipap for about an hour this afternoon while napping, even after taking a Xanax. Patient did take his Cozaar 50mg and Cardizem CD 180mg this morning. Normal saline continues to infuse @ 75ml per hour. Any changes?

## 2018-10-09 NOTE — PLAN OF CARE
Problem: Breathing Pattern - Ineffective:  Goal: Ability to achieve and maintain a regular respiratory rate will improve  Ability to achieve and maintain a regular respiratory rate will improve  Outcome: Ongoing  Bi pap pressures adjusted to 12 / 6 with 40% FIO2.

## 2018-10-09 NOTE — PROGRESS NOTES
99 lb 6.4 oz (45.1 kg)   08/20/18 107 lb 4.8 oz (48.7 kg)   06/21/18 94 lb 9.6 oz (42.9 kg)   06/11/18 95 lb (43.1 kg)   02/22/18 98 lb (44.5 kg)   01/09/18 100 lb 8 oz (45.6 kg)   08/31/17 112 lb (50.8 kg)   Peng and I discuss [facility]. We have discussed this in the past also. Encouraged pt to consider this service as an outpatient for symptom management and psychosocial support. Will give brochure again to patient. Discussed clinic setting in [facility]. Pt states that the doctors are not encouraging/helpful to him. States \"they just tell me to quit smoking\". We discuss that he has to play a role in his health and any improvement. Importance of compliance with recommended treatment discussed along with assistance of specialty support through Palliative Care. States understanding. Denies further questions or needs at this time. Will continue to follow and support. Recommendations:  1. Referral to [facility] for symptom management and psychosocial support. Is noncompliant with taking of Xanax at home--would be more closely monitored with compliance through [facility]. 2.  Needs assistance with BiPap at home. Has home health services. Palliative Care Plan:  Education/support to patient  Discharge planning/helping to coordinate care  Providing support for coping/adaptation/distress of patient  Managing depression/anxiety  Substance abuse issues  Continue with current plan of care  Code status clarified: Full Code  Medications to decrease non-pain symptoms  Palliative Care Goals:  improve or maintain function/quality of life, remain at home and preserve independence/autonomy/control  Visit focus:  Routine meeting  Discuss goals of care  Listen to patient/family concerns  Interdiscplinary collaboration  Elicit patient's goals and values, and use these to establish or modify goals of care     Claudette Erickson RN, Yoli Cruz and Chris Horton

## 2018-10-09 NOTE — PROGRESS NOTES
Physical Therapy    Facility/Department: [facility] AT THE [facility]  Initial Assessment    NAME: Theresa Dent  : 1965  MRN: [id number]    Date of Service: 10/9/2018    Discharge Recommendations:  Continue to assess pending progress        Patient Diagnosis(es): The primary encounter diagnosis was Hypercapnia. A diagnosis of Chest pain, unspecified type was also pertinent to this visit. has a past medical history of Arthritis; COPD (chronic obstructive pulmonary disease) ([facility] Hypertension; Oxygen dependent; and Scoliosis. has a past surgical history that includes Tonsillectomy; other surgical history (Left, 2017); and Facial reconstruction surgery (Left, 2017). Restrictions  Restrictions/Precautions  Restrictions/Precautions: General Precautions, Fall Risk  Vision/Hearing  Vision: Within Functional Limits  Hearing: Within functional limits     Subjective  General  Chart Reviewed: Yes  Patient assessed for rehabilitation services?: Yes  Response To Previous Treatment: Not applicable  Family / Caregiver Present: No  Referring Practitioner: Dr. [facility]   Referral Date : 10/09/18  Diagnosis: COPD   Subjective  Subjective: pt reported he is feeling fatigued and SOB   Pain Screening  Patient Currently in Pain: Denies  Vital Signs  Patient Currently in Pain: Denies       Orientation  Orientation  Overall Orientation Status: Within Normal Limits    Social/Functional History  Social/Functional History  Lives With: Significant other  Type of Home: Apartment  Home Access: Stairs to enter with rails  Bathroom Shower/Tub: Tub/Shower unit  Bathroom Equipment: Shower chair  Receives Help From: Friend(s)  ADL Assistance: Independent  Homemaking Assistance: Needs assistance  Ambulation Assistance: Independent  Transfer Assistance: Independent  Additional Comments: Patient on 2L oxygen at home.   Objective          AROM RLE (degrees)  RLE AROM: WFL  AROM LLE (degrees)  LLE AROM : WFL  Strength RLE  Comment: grossly 4-/5

## 2018-10-09 NOTE — PROGRESS NOTES
Caridad Ashley M.D. ICU Progress Note 10/9/18    SUBJECTIVE:    Pt seen and examined in the ICU for follow up of COPD exacerbation ([address] Is still quite short of breath. Coughing still. Feels \"awful\". ROS:   Constitutional: negative  for fevers, and negative for chills. Respiratory: negative for shortness of breath, negative for cough, and negative for wheezing  Cardiovascular: negative for chest pain, and negative for palpitations  Gastrointestinal: negative for abdominal pain, negative for nausea,negative for vomiting, negative for diarrhea, and negative for constipation    All other systems were reviewed with the patient and are negative unless otherwise stated in HPI. OBJECTIVE:    Vitals:   Temp: 98.8 °F (37.1 °C)  Temp range:    Temp  Av.9 °F (36.6 °C)  Min: 97.1 °F (36.2 °C)  Max: 98.8 °F (37.1 °C)    BP: 102/80  BP Range:      Systolic (43ACY), JTP:871 , Min:85 , JDU:404      Diastolic (64UAZ), VNJ:86, Min:61, Max:89    Pulse: 106  Pulse Range:    Pulse  Av.3  Min: 88  Max: 109    Resp: 26  Resp Range:   Resp  Av.5  Min: 13  Max: 26    SpO2: 96 % on supplemental O2  SpO2 range:   SpO2  Av %  Min: 86 %  Max: 99 %    24HR INTAKE/OUTPUT:    Intake/Output Summary (Last 24 hours) at 10/09/18 0718  Last data filed at 10/09/18 0300   Gross per 24 hour   Intake              829 ml   Output              700 ml   Net              129 ml     -----------------------------------------------------------------  Exam:  GEN:    Awake, alert and oriented x 3. no acute distress  EYES:  EOMI, pupils equal   NECK:  Supple. No lymphadenopathy. No carotid bruit  CVS:    RRR, no murmur, rub or gallop  PULM:  diminished. exp wheezing  ABD:    Bowels sounds normal.  Abdomen is soft. No distention. No tenderness. EXT:   no edema bilaterally . No calf tenderness. NEURO: Motor and sensory are intact  SKIN:  No rashes. No skin lesions.     LINES: antecubital IV in place, no signs of

## 2018-10-09 NOTE — PROGRESS NOTES
Discussed discharge plans with the patient. Patient is a 48year old male here with COPD exacerbation . He is alert and oriented. Patient is single and lives with his girlfriend. He has oxygen , nebulizer, and a shower chair at home. His Bi-pap machine is broke and can't be fix. Patient working on buying another one. The girlfriend does the cooking , cleaning, and the driving. Patient manages his own medications. His PCP is Stephenie Phelps CNP. He has medical insurance that helps with medication costs. The discharge plan is pending. Patient was receiving services from [facility] prior to his admission to the hospital. Patient trying to decided if he wants to go to short term rehab before going home. Will follow up with patient to see which discharge plan he chooses home with home health or to go to SNF.     ESTER Duque

## 2018-10-09 NOTE — PROGRESS NOTES
Pt stated his BIPAP machine in broken at home and he started smoking again because he was upset about it. Pt stated once he gets another BIPAP machine he will stop smoking again.

## 2018-10-09 NOTE — PROGRESS NOTES
Independent  Grooming: Contact guard assistance  UE Bathing: Stand by assistance  LE Bathing: Contact guard assistance  UE Dressing: Stand by assistance  LE Dressing: Contact guard assistance  Toileting: Contact guard assistance        Transfers  Stand Pivot Transfers: Contact guard assistance  Sit to stand: Contact guard assistance  Stand to sit: Contact guard assistance     Cognition  Overall Cognitive Status: WFL       LUE AROM (degrees)  LUE AROM : WFL  RUE AROM (degrees)  RUE AROM : WFL  LUE Strength  Gross LUE Strength: WFL  RUE Strength  Gross RUE Strength: WFL       Assessment   Performance deficits / Impairments: Decreased functional mobility ; Decreased ADL status; Decreased strength;Decreased endurance;Decreased high-level IADLs  Prognosis: Good  Decision Making: Medium Complexity  REQUIRES OT FOLLOW UP: Yes  Activity Tolerance  Activity Tolerance: Patient Tolerated treatment well  Activity Tolerance: SpO2 ranging from 91-95% on 2L  Safety Devices  Safety Devices in place: Yes  Type of devices: Left in chair;Call light within reach         Plan   Plan  Times per week: 7x/wk  Times per day: Daily  Current Treatment Recommendations: Strengthening, Balance Training, Functional Mobility Training, Safety Education & Training, Self-Care / ADL    G-Code  OT G-codes  Functional Limitation: Self care  Self Care Current Status (): At least 40 percent but less than 60 percent impaired, limited or restricted  Self Care Goal Status (): At least 20 percent but less than 40 percent impaired, limited or restricted      Goals  Short term goals  Time Frame for Short term goals: 10 days  Short term goal 1: Pt. will tolerate 15 mins of BUE ther ex/act with min SOB to increase overall strength/activity tolerance for fxl tasks. Short term goal 2: Pt. will complete self care routine with SUP with use of EC techs PRN to increase (I).   Short term goal 3: Pt. will demo G safety awareness during functional ADL

## 2018-10-10 LAB
ABSOLUTE EOS #: 0 K/UL (ref 0–0.44)
ABSOLUTE IMMATURE GRANULOCYTE: 0 K/UL (ref 0–0.3)
ABSOLUTE LYMPH #: 0.28 K/UL (ref 1.1–3.7)
ABSOLUTE MONO #: 0.56 K/UL (ref 0.1–1.2)
ANION GAP SERPL CALCULATED.3IONS-SCNC: 6 MMOL/L (ref 9–17)
BASOPHILS # BLD: 0 % (ref 0–2)
BASOPHILS ABSOLUTE: 0 K/UL (ref 0–0.2)
BUN BLDV-MCNC: 16 MG/DL (ref 6–20)
BUN/CREAT BLD: 36 (ref 9–20)
CALCIUM SERPL-MCNC: 8.9 MG/DL (ref 8.6–10.4)
CHLORIDE BLD-SCNC: 99 MMOL/L (ref 98–107)
CO2: 35 MMOL/L (ref 20–31)
CREAT SERPL-MCNC: 0.44 MG/DL (ref 0.7–1.2)
DIFFERENTIAL TYPE: ABNORMAL
EOSINOPHILS RELATIVE PERCENT: 0 % (ref 1–4)
GFR AFRICAN AMERICAN: >60 ML/MIN
GFR NON-AFRICAN AMERICAN: >60 ML/MIN
GFR SERPL CREATININE-BSD FRML MDRD: ABNORMAL ML/MIN/{1.73_M2}
GFR SERPL CREATININE-BSD FRML MDRD: ABNORMAL ML/MIN/{1.73_M2}
GLUCOSE BLD-MCNC: 108 MG/DL (ref 70–99)
HCT VFR BLD CALC: 33.4 % (ref 40.7–50.3)
HEMOGLOBIN: 10.4 G/DL (ref 13–17)
IMMATURE GRANULOCYTES: 0 %
LYMPHOCYTES # BLD: 3 % (ref 24–43)
MCH RBC QN AUTO: 33.2 PG (ref 25.2–33.5)
MCHC RBC AUTO-ENTMCNC: 31.1 G/DL (ref 28.4–34.8)
MCV RBC AUTO: 106.7 FL (ref 82.6–102.9)
MONOCYTES # BLD: 6 % (ref 3–12)
MORPHOLOGY: NORMAL
NRBC AUTOMATED: 0 PER 100 WBC
PDW BLD-RTO: 13.3 % (ref 11.8–14.4)
PLATELET # BLD: 148 K/UL (ref 138–453)
PLATELET ESTIMATE: ABNORMAL
PMV BLD AUTO: 9.5 FL (ref 8.1–13.5)
POTASSIUM SERPL-SCNC: 4.2 MMOL/L (ref 3.7–5.3)
RBC # BLD: 3.13 M/UL (ref 4.21–5.77)
RBC # BLD: ABNORMAL 10*6/UL
SEG NEUTROPHILS: 91 % (ref 36–65)
SEGMENTED NEUTROPHILS ABSOLUTE COUNT: 8.56 K/UL (ref 1.5–8.1)
SODIUM BLD-SCNC: 140 MMOL/L (ref 135–144)
WBC # BLD: 9.4 K/UL (ref 3.5–11.3)
WBC # BLD: ABNORMAL 10*3/UL

## 2018-10-10 PROCEDURE — 94640 AIRWAY INHALATION TREATMENT: CPT

## 2018-10-10 PROCEDURE — 6360000002 HC RX W HCPCS: Performed by: INTERNAL MEDICINE

## 2018-10-10 PROCEDURE — 6370000000 HC RX 637 (ALT 250 FOR IP): Performed by: INTERNAL MEDICINE

## 2018-10-10 PROCEDURE — 36415 COLL VENOUS BLD VENIPUNCTURE: CPT

## 2018-10-10 PROCEDURE — 85025 COMPLETE CBC W/AUTO DIFF WBC: CPT

## 2018-10-10 PROCEDURE — 97110 THERAPEUTIC EXERCISES: CPT

## 2018-10-10 PROCEDURE — 1200000000 HC SEMI PRIVATE

## 2018-10-10 PROCEDURE — 94660 CPAP INITIATION&MGMT: CPT

## 2018-10-10 PROCEDURE — 80048 BASIC METABOLIC PNL TOTAL CA: CPT

## 2018-10-10 PROCEDURE — 94762 N-INVAS EAR/PLS OXIMTRY CONT: CPT

## 2018-10-10 PROCEDURE — 94664 DEMO&/EVAL PT USE INHALER: CPT

## 2018-10-10 PROCEDURE — 94761 N-INVAS EAR/PLS OXIMETRY MLT: CPT

## 2018-10-10 PROCEDURE — 2700000000 HC OXYGEN THERAPY PER DAY

## 2018-10-10 PROCEDURE — 2580000003 HC RX 258: Performed by: INTERNAL MEDICINE

## 2018-10-10 RX ADMIN — HYDROCODONE BITARTRATE AND ACETAMINOPHEN 1 TABLET: 5; 325 TABLET ORAL at 10:28

## 2018-10-10 RX ADMIN — CITALOPRAM HYDROBROMIDE 40 MG: 20 TABLET ORAL at 08:14

## 2018-10-10 RX ADMIN — DOXYCYCLINE HYCLATE 100 MG: 100 CAPSULE ORAL at 17:19

## 2018-10-10 RX ADMIN — ALPRAZOLAM 0.5 MG: 0.5 TABLET ORAL at 15:40

## 2018-10-10 RX ADMIN — PSEUDOEPHEDRINE HYDROCHLORIDE 120 MG: 120 TABLET, FILM COATED, EXTENDED RELEASE ORAL at 05:22

## 2018-10-10 RX ADMIN — SODIUM CHLORIDE: 9 INJECTION, SOLUTION INTRAVENOUS at 07:07

## 2018-10-10 RX ADMIN — CEFUROXIME AXETIL 500 MG: 250 TABLET ORAL at 22:09

## 2018-10-10 RX ADMIN — DOXYCYCLINE HYCLATE 100 MG: 100 CAPSULE ORAL at 05:22

## 2018-10-10 RX ADMIN — MONTELUKAST SODIUM 10 MG: 10 TABLET, FILM COATED ORAL at 08:13

## 2018-10-10 RX ADMIN — DILTIAZEM HYDROCHLORIDE 180 MG: 180 CAPSULE, COATED, EXTENDED RELEASE ORAL at 08:14

## 2018-10-10 RX ADMIN — METHYLPREDNISOLONE SODIUM SUCCINATE 60 MG: 125 INJECTION, POWDER, FOR SOLUTION INTRAMUSCULAR; INTRAVENOUS at 17:20

## 2018-10-10 RX ADMIN — POTASSIUM CHLORIDE 20 MEQ: 20 TABLET, EXTENDED RELEASE ORAL at 08:14

## 2018-10-10 RX ADMIN — IPRATROPIUM BROMIDE AND ALBUTEROL SULFATE 1 AMPULE: .5; 3 SOLUTION RESPIRATORY (INHALATION) at 16:13

## 2018-10-10 RX ADMIN — CEFUROXIME AXETIL 500 MG: 250 TABLET ORAL at 08:13

## 2018-10-10 RX ADMIN — TAMSULOSIN HYDROCHLORIDE 0.4 MG: 0.4 CAPSULE ORAL at 08:14

## 2018-10-10 RX ADMIN — LOSARTAN POTASSIUM 50 MG: 50 TABLET ORAL at 08:14

## 2018-10-10 RX ADMIN — APIXABAN 5 MG: 5 TABLET, FILM COATED ORAL at 08:14

## 2018-10-10 RX ADMIN — FAMOTIDINE 20 MG: 20 TABLET ORAL at 08:14

## 2018-10-10 RX ADMIN — METHYLPREDNISOLONE SODIUM SUCCINATE 60 MG: 125 INJECTION, POWDER, FOR SOLUTION INTRAMUSCULAR; INTRAVENOUS at 05:22

## 2018-10-10 RX ADMIN — IPRATROPIUM BROMIDE AND ALBUTEROL SULFATE 1 AMPULE: .5; 3 SOLUTION RESPIRATORY (INHALATION) at 08:56

## 2018-10-10 RX ADMIN — MOMETASONE FUROATE AND FORMOTEROL FUMARATE DIHYDRATE 2 PUFF: 200; 5 AEROSOL RESPIRATORY (INHALATION) at 08:56

## 2018-10-10 RX ADMIN — MOMETASONE FUROATE AND FORMOTEROL FUMARATE DIHYDRATE 2 PUFF: 200; 5 AEROSOL RESPIRATORY (INHALATION) at 20:11

## 2018-10-10 RX ADMIN — SODIUM CHLORIDE: 9 INJECTION, SOLUTION INTRAVENOUS at 17:22

## 2018-10-10 RX ADMIN — HYDROCODONE BITARTRATE AND ACETAMINOPHEN 1 TABLET: 5; 325 TABLET ORAL at 17:24

## 2018-10-10 RX ADMIN — IPRATROPIUM BROMIDE AND ALBUTEROL SULFATE 1 AMPULE: .5; 3 SOLUTION RESPIRATORY (INHALATION) at 20:11

## 2018-10-10 RX ADMIN — FAMOTIDINE 20 MG: 20 TABLET ORAL at 22:10

## 2018-10-10 RX ADMIN — APIXABAN 5 MG: 5 TABLET, FILM COATED ORAL at 22:10

## 2018-10-10 RX ADMIN — PSEUDOEPHEDRINE HYDROCHLORIDE 120 MG: 120 TABLET, FILM COATED, EXTENDED RELEASE ORAL at 17:19

## 2018-10-10 ASSESSMENT — PAIN DESCRIPTION - LOCATION
LOCATION: RIB CAGE

## 2018-10-10 ASSESSMENT — PAIN DESCRIPTION - ORIENTATION
ORIENTATION: LEFT

## 2018-10-10 ASSESSMENT — PAIN DESCRIPTION - PAIN TYPE
TYPE: ACUTE PAIN
TYPE: ACUTE PAIN
TYPE: CHRONIC PAIN
TYPE: CHRONIC PAIN

## 2018-10-10 ASSESSMENT — PAIN DESCRIPTION - PROGRESSION
CLINICAL_PROGRESSION: NOT CHANGED
CLINICAL_PROGRESSION: NOT CHANGED

## 2018-10-10 ASSESSMENT — PAIN SCALES - GENERAL
PAINLEVEL_OUTOF10: 6
PAINLEVEL_OUTOF10: 5
PAINLEVEL_OUTOF10: 3
PAINLEVEL_OUTOF10: 4
PAINLEVEL_OUTOF10: 5
PAINLEVEL_OUTOF10: 3
PAINLEVEL_OUTOF10: 9
PAINLEVEL_OUTOF10: 8

## 2018-10-10 ASSESSMENT — PAIN DESCRIPTION - FREQUENCY
FREQUENCY: CONTINUOUS

## 2018-10-10 ASSESSMENT — PAIN DESCRIPTION - ONSET
ONSET: ON-GOING

## 2018-10-10 ASSESSMENT — PAIN DESCRIPTION - DESCRIPTORS
DESCRIPTORS: THROBBING
DESCRIPTORS: ACHING
DESCRIPTORS: ACHING
DESCRIPTORS: ACHING;DISCOMFORT

## 2018-10-10 NOTE — PROGRESS NOTES
Occupational Therapy  Facility/Department: [facility] AT THE [facility]  Daily Treatment Note  NAME: Julienne Mclean  : 1965  MRN: [id number]    Date of Service: 10/10/2018    Discharge Recommendations:  Continue to assess pending progress       Patient Diagnosis(es): The primary encounter diagnosis was Hypercapnia. A diagnosis of Chest pain, unspecified type was also pertinent to this visit. has a past medical history of Arthritis; COPD (chronic obstructive pulmonary disease) ([address] Hypertension; Oxygen dependent; and Scoliosis. has a past surgical history that includes Tonsillectomy; other surgical history (Left, 2017); and Facial reconstruction surgery (Left, 2017). Restrictions  Restrictions/Precautions  Restrictions/Precautions: General Precautions, Fall Risk  Subjective   General  Chart Reviewed: Yes  Patient assessed for rehabilitation services?: Yes  Response to previous treatment: Patient with no complaints from previous session  Family / Caregiver Present: No  Diagnosis: COPD exacerbation  Subjective  Subjective: Pt c/o 6/10 rib pain. Orientation     Objective                                                                Type of ROM/Therapeutic Exercise  Type of ROM/Therapeutic Exercise: Free weights  Comment: Pt completed B UE ther ex in supine with HOB elevated to ~45 degrees, 3# dumbell, 20 reps x 2 sets x all planes. Pt OT participation limited by fatigue this date. Educated patient on discharge folder, and patient verbalized understanding.                     Assessment      Safety Devices  Safety Devices in place: Yes  Type of devices: Left in bed;Call light within reach;Nurse notified          Plan   Plan  Times per week: 7x/wk  Times per day: Daily  Current Treatment Recommendations: Strengthening, Balance Training, Functional Mobility Training, Safety Education & Training, Self-Care / ADL  G-Code     OutComes Score                                           AM-PAC Score Goals  Short term goals  Time Frame for Short term goals: 10 days  Short term goal 1: Pt. will tolerate 15 mins of BUE ther ex/act with min SOB to increase overall strength/activity tolerance for fxl tasks. Short term goal 2: Pt. will complete self care routine with SUP with use of EC techs PRN to increase (I). Short term goal 3: Pt. will demo G safety awareness during functional ADL transfers/mobility with reduced risk for falls.        Therapy Time   Individual Concurrent Group Co-treatment   Time In 7964         Time Out 1333         Minutes [address] [place], [place]

## 2018-10-11 LAB
ABSOLUTE EOS #: 0 K/UL (ref 0–0.44)
ABSOLUTE IMMATURE GRANULOCYTE: 0 K/UL (ref 0–0.3)
ABSOLUTE LYMPH #: 0.62 K/UL (ref 1.1–3.7)
ABSOLUTE MONO #: 0.21 K/UL (ref 0.1–1.2)
ANION GAP SERPL CALCULATED.3IONS-SCNC: 4 MMOL/L (ref 9–17)
BASOPHILS # BLD: 0 % (ref 0–2)
BASOPHILS ABSOLUTE: 0 K/UL (ref 0–0.2)
BUN BLDV-MCNC: 18 MG/DL (ref 6–20)
BUN/CREAT BLD: 43 (ref 9–20)
CALCIUM SERPL-MCNC: 8.7 MG/DL (ref 8.6–10.4)
CHLORIDE BLD-SCNC: 100 MMOL/L (ref 98–107)
CO2: 34 MMOL/L (ref 20–31)
CREAT SERPL-MCNC: 0.42 MG/DL (ref 0.7–1.2)
DIFFERENTIAL TYPE: ABNORMAL
EOSINOPHILS RELATIVE PERCENT: 0 % (ref 1–4)
GFR AFRICAN AMERICAN: >60 ML/MIN
GFR NON-AFRICAN AMERICAN: >60 ML/MIN
GFR SERPL CREATININE-BSD FRML MDRD: ABNORMAL ML/MIN/{1.73_M2}
GFR SERPL CREATININE-BSD FRML MDRD: ABNORMAL ML/MIN/{1.73_M2}
GLUCOSE BLD-MCNC: 103 MG/DL (ref 70–99)
HCT VFR BLD CALC: 33 % (ref 40.7–50.3)
HEMOGLOBIN: 10.3 G/DL (ref 13–17)
IMMATURE GRANULOCYTES: 0 %
LYMPHOCYTES # BLD: 6 % (ref 24–43)
MCH RBC QN AUTO: 33.6 PG (ref 25.2–33.5)
MCHC RBC AUTO-ENTMCNC: 31.2 G/DL (ref 28.4–34.8)
MCV RBC AUTO: 107.5 FL (ref 82.6–102.9)
MONOCYTES # BLD: 2 % (ref 3–12)
MORPHOLOGY: ABNORMAL
NRBC AUTOMATED: 0 PER 100 WBC
PDW BLD-RTO: 13.9 % (ref 11.8–14.4)
PLATELET # BLD: ABNORMAL K/UL (ref 138–453)
PLATELET ESTIMATE: ABNORMAL
PLATELET, FLUORESCENCE: 163 K/UL (ref 138–453)
PLATELET, IMMATURE FRACTION: 2.8 % (ref 1.1–10.3)
PMV BLD AUTO: ABNORMAL FL (ref 8.1–13.5)
POTASSIUM SERPL-SCNC: 4.5 MMOL/L (ref 3.7–5.3)
RBC # BLD: 3.07 M/UL (ref 4.21–5.77)
RBC # BLD: ABNORMAL 10*6/UL
SEG NEUTROPHILS: 92 % (ref 36–65)
SEGMENTED NEUTROPHILS ABSOLUTE COUNT: 9.57 K/UL (ref 1.5–8.1)
SODIUM BLD-SCNC: 138 MMOL/L (ref 135–144)
WBC # BLD: 10.4 K/UL (ref 3.5–11.3)
WBC # BLD: ABNORMAL 10*3/UL

## 2018-10-11 PROCEDURE — 6370000000 HC RX 637 (ALT 250 FOR IP): Performed by: INTERNAL MEDICINE

## 2018-10-11 PROCEDURE — 97535 SELF CARE MNGMENT TRAINING: CPT

## 2018-10-11 PROCEDURE — 6360000002 HC RX W HCPCS: Performed by: INTERNAL MEDICINE

## 2018-10-11 PROCEDURE — 1200000000 HC SEMI PRIVATE

## 2018-10-11 PROCEDURE — 85055 RETICULATED PLATELET ASSAY: CPT

## 2018-10-11 PROCEDURE — 36415 COLL VENOUS BLD VENIPUNCTURE: CPT

## 2018-10-11 PROCEDURE — 94660 CPAP INITIATION&MGMT: CPT

## 2018-10-11 PROCEDURE — 97110 THERAPEUTIC EXERCISES: CPT

## 2018-10-11 PROCEDURE — 85025 COMPLETE CBC W/AUTO DIFF WBC: CPT

## 2018-10-11 PROCEDURE — 94640 AIRWAY INHALATION TREATMENT: CPT

## 2018-10-11 PROCEDURE — 97116 GAIT TRAINING THERAPY: CPT

## 2018-10-11 PROCEDURE — 80048 BASIC METABOLIC PNL TOTAL CA: CPT

## 2018-10-11 PROCEDURE — 2580000003 HC RX 258: Performed by: INTERNAL MEDICINE

## 2018-10-11 PROCEDURE — 94760 N-INVAS EAR/PLS OXIMETRY 1: CPT

## 2018-10-11 RX ADMIN — IPRATROPIUM BROMIDE AND ALBUTEROL SULFATE 1 AMPULE: .5; 3 SOLUTION RESPIRATORY (INHALATION) at 20:33

## 2018-10-11 RX ADMIN — FAMOTIDINE 20 MG: 20 TABLET ORAL at 08:16

## 2018-10-11 RX ADMIN — ALPRAZOLAM 0.5 MG: 0.5 TABLET ORAL at 03:38

## 2018-10-11 RX ADMIN — DOXYCYCLINE HYCLATE 100 MG: 100 CAPSULE ORAL at 18:02

## 2018-10-11 RX ADMIN — MOMETASONE FUROATE AND FORMOTEROL FUMARATE DIHYDRATE 2 PUFF: 200; 5 AEROSOL RESPIRATORY (INHALATION) at 07:37

## 2018-10-11 RX ADMIN — HYDROCODONE BITARTRATE AND ACETAMINOPHEN 1 TABLET: 5; 325 TABLET ORAL at 00:12

## 2018-10-11 RX ADMIN — HYDROCODONE BITARTRATE AND ACETAMINOPHEN 1 TABLET: 5; 325 TABLET ORAL at 20:56

## 2018-10-11 RX ADMIN — DOXYCYCLINE HYCLATE 100 MG: 100 CAPSULE ORAL at 05:43

## 2018-10-11 RX ADMIN — IPRATROPIUM BROMIDE AND ALBUTEROL SULFATE 1 AMPULE: .5; 3 SOLUTION RESPIRATORY (INHALATION) at 14:43

## 2018-10-11 RX ADMIN — POTASSIUM CHLORIDE 20 MEQ: 20 TABLET, EXTENDED RELEASE ORAL at 08:16

## 2018-10-11 RX ADMIN — PSEUDOEPHEDRINE HYDROCHLORIDE 120 MG: 120 TABLET, FILM COATED, EXTENDED RELEASE ORAL at 18:02

## 2018-10-11 RX ADMIN — MONTELUKAST SODIUM 10 MG: 10 TABLET, FILM COATED ORAL at 08:15

## 2018-10-11 RX ADMIN — HYDROCODONE BITARTRATE AND ACETAMINOPHEN 1 TABLET: 5; 325 TABLET ORAL at 15:04

## 2018-10-11 RX ADMIN — APIXABAN 5 MG: 5 TABLET, FILM COATED ORAL at 20:56

## 2018-10-11 RX ADMIN — APIXABAN 5 MG: 5 TABLET, FILM COATED ORAL at 08:16

## 2018-10-11 RX ADMIN — ALPRAZOLAM 0.5 MG: 0.5 TABLET ORAL at 15:04

## 2018-10-11 RX ADMIN — TAMSULOSIN HYDROCHLORIDE 0.4 MG: 0.4 CAPSULE ORAL at 08:15

## 2018-10-11 RX ADMIN — IPRATROPIUM BROMIDE AND ALBUTEROL SULFATE 1 AMPULE: .5; 3 SOLUTION RESPIRATORY (INHALATION) at 07:37

## 2018-10-11 RX ADMIN — METHYLPREDNISOLONE SODIUM SUCCINATE 60 MG: 125 INJECTION, POWDER, FOR SOLUTION INTRAMUSCULAR; INTRAVENOUS at 18:01

## 2018-10-11 RX ADMIN — PSEUDOEPHEDRINE HYDROCHLORIDE 120 MG: 120 TABLET, FILM COATED, EXTENDED RELEASE ORAL at 05:43

## 2018-10-11 RX ADMIN — CEFUROXIME AXETIL 500 MG: 250 TABLET ORAL at 08:15

## 2018-10-11 RX ADMIN — DILTIAZEM HYDROCHLORIDE 180 MG: 180 CAPSULE, COATED, EXTENDED RELEASE ORAL at 08:16

## 2018-10-11 RX ADMIN — CITALOPRAM HYDROBROMIDE 40 MG: 20 TABLET ORAL at 08:16

## 2018-10-11 RX ADMIN — SODIUM CHLORIDE: 9 INJECTION, SOLUTION INTRAVENOUS at 03:37

## 2018-10-11 RX ADMIN — METHYLPREDNISOLONE SODIUM SUCCINATE 60 MG: 125 INJECTION, POWDER, FOR SOLUTION INTRAMUSCULAR; INTRAVENOUS at 05:43

## 2018-10-11 RX ADMIN — MOMETASONE FUROATE AND FORMOTEROL FUMARATE DIHYDRATE 2 PUFF: 200; 5 AEROSOL RESPIRATORY (INHALATION) at 20:33

## 2018-10-11 RX ADMIN — FAMOTIDINE 20 MG: 20 TABLET ORAL at 20:56

## 2018-10-11 RX ADMIN — CEFUROXIME AXETIL 500 MG: 250 TABLET ORAL at 20:56

## 2018-10-11 ASSESSMENT — PAIN DESCRIPTION - DESCRIPTORS
DESCRIPTORS: ACHING;DISCOMFORT;HEADACHE
DESCRIPTORS: ACHING;HEADACHE
DESCRIPTORS: ACHING
DESCRIPTORS: ACHING

## 2018-10-11 ASSESSMENT — PAIN DESCRIPTION - FREQUENCY
FREQUENCY: CONTINUOUS

## 2018-10-11 ASSESSMENT — PAIN DESCRIPTION - ORIENTATION
ORIENTATION: LEFT
ORIENTATION: MID

## 2018-10-11 ASSESSMENT — PAIN DESCRIPTION - LOCATION
LOCATION: RIB CAGE
LOCATION: HEAD
LOCATION: HEAD
LOCATION: HEAD;RIB CAGE

## 2018-10-11 ASSESSMENT — PAIN DESCRIPTION - PAIN TYPE
TYPE: ACUTE PAIN
TYPE: CHRONIC PAIN
TYPE: ACUTE PAIN
TYPE: CHRONIC PAIN

## 2018-10-11 ASSESSMENT — PAIN SCALES - GENERAL
PAINLEVEL_OUTOF10: 4
PAINLEVEL_OUTOF10: 8
PAINLEVEL_OUTOF10: 9
PAINLEVEL_OUTOF10: 0
PAINLEVEL_OUTOF10: 8

## 2018-10-11 ASSESSMENT — PAIN DESCRIPTION - ONSET
ONSET: ON-GOING

## 2018-10-11 ASSESSMENT — PAIN DESCRIPTION - PROGRESSION: CLINICAL_PROGRESSION: GRADUALLY IMPROVING

## 2018-10-11 NOTE — PROGRESS NOTES
Nutrition Assessment    Type and Reason for Visit: Reassess    Nutrition Recommendations:  Continue current nutrition plan    Nutrition Assessment:  · Subjective Assessment: States trying to use fluids towards end of meal.  Did not drink coffee at lunch today. · Nutrition-Focused Physical Findings: tired appearing  · Wound Type: None  · Current Nutrition Therapies:  · Oral Diet Orders: General   · Oral Diet intake: %  · Oral Nutrition Supplement (ONS) Orders: Standard High Calorie Oral Supplement (added today)  · ONS intake: %  · Anthropometric Measures:  · Ht: 5' 4\" (162.6 cm)   · Current Body Wt: 107 lb 14.4 oz (48.9 kg)  · Admission Body Wt: 97 lb 14.2 oz (44.4 kg)  · Usual Body Wt:  (94-99# lately)  · % Weight Change: 8.9% increase,  acute  · Ideal Body Wt: 130 lb (59 kg), % Ideal Body 83%  · BMI Classification: BMI 18.5 - 24.9 Normal Weight  · Comparative Standards (Estimated Nutrition Needs):  · Estimated Daily Total Kcal: 0717-5986  · Estimated Daily Protein (g): 66-79    Lab Results   Component Value Date     10/11/2018    K 4.5 10/11/2018     10/11/2018    CO2 34 (H) 10/11/2018    BUN 18 10/11/2018    CREATININE 0.42 (L) 10/11/2018    GLUCOSE 103 (H) 10/11/2018    CALCIUM 8.7 10/11/2018    PROT 7.4 09/29/2018    LABALBU 4.6 09/29/2018    BILITOT 0.52 09/29/2018    ALKPHOS 93 09/29/2018    AST 33 09/29/2018    ALT 26 09/29/2018    LABGLOM >60 10/11/2018    GFRAA >60 10/11/2018     No results for input(s): POCGLU in the last 72 hours. Estimated Intake vs Estimated Needs: Intake Improving    Nutrition Risk Level: Moderate, High    Improving nutrition intakes. Weight up r/t nutrition intakes and ongoing ivf provision (50 ml/hr). Creatinine declines despite improved intakes, and would ?r/t fluid positive state. Encouraged fluids at end of or between meals again upon visit.      Nutrition Interventions:   Continue current ONS, Continue current diet  Continued Inpatient Monitoring,

## 2018-10-11 NOTE — PROGRESS NOTES
Hospitalist Progress Note    SUBJECTIVE/INTERVAL HISTORY:    Patient seen in follow up for COPD exacerbation, anxiety, EtOH abuse, PAF, HTN, rib fx, acute on chronic respiratory failure. BIPAP all night. 2L during day. BP improved. Transferrd to floor yesterday. NO new events. BC NGTD      OBJECTIVE:    Vitals:   Temp: 98.2 °F (36.8 °C)  Temp range:    Temp  Av.7 °F (36.5 °C)  Min: 96.9 °F (36.1 °C)  Max: 98.4 °F (36.9 °C)    BP: 103/76  BP Range:      Systolic (46MKU), WJY:459 , Min:98 , VCT:876      Diastolic (12OAS), RJZ:53, Min:65, Max:77    Pulse: 71  Pulse Range:    Pulse  Av.7  Min: 71  Max: 98    Resp: 20  Resp Range:   Resp  Av.2  Min: 13  Max: 22    SpO2: 95 % on supplemental O2  SpO2 range:   SpO2  Av.5 %  Min: 93 %  Max: 99 %    24HR INTAKE/OUTPUT:      Intake/Output Summary (Last 24 hours) at 10/11/18 1230  Last data filed at 10/11/18 0905   Gross per 24 hour   Intake             3554 ml   Output              300 ml   Net             3254 ml       -----------------------------------------------------------------  Review of Systems:  Constitutional:negative  for fevers, and negative for chills. Eyes: negative for visual disturbance   ENT: negative for sore throat, negative nasal congestion, and negative for earache  Respiratory: positive for shortness of breath, positive for cough, and negative for wheezing  Cardiovascular: negative for chest pain, negative for palpitations, and negative for syncope  Gastrointestinal: negative for abdominal pain, negative for nausea,negative for vomiting, negative for diarrhea, negative for constipation, and negative for hematochezia or melena  Genitourinary: negative for dysuria, negative for urinary urgency, negative for urinary frequency, and negative for hematuria  Skin: negative for skin rash, and negative for skin lesions  Neurological: negative for unilateral weakness, numbness or tingling.     Exam:  GEN: [place] and oriented to person, respiratory failure with hypoxia ([facility] 06/11/2018    COPD exacerbation ([facility] 06/09/2018    Dyslipidemia 04/11/2017    COPD, severe ([facility] 03/28/2017    Uncontrolled hypertension 03/28/2017       PLAN:    COPD exacerbation/COPD, severe/Acute on chronic respiratory failure    Empiric abx   Steroids   Nebs   BIPAP at night and prn - needs new machine at home   O2   Singulair   dulera   nicoderm    Hypotension improved    Paroxysmal atrial fibrillation    eliquis   Rate control with cardizem    Closed fracture of one rib of left side   Pain control    Dyslipidemia    Tobacco abuse   nicodem    Severe anxiety   xanax    ETOH abuse    Hypertension    Moderate malnutrition     Likely discharge to SNF tomorrow    · High risk medication: none    ANIYA VILLEDA PA-C  10/11/2018, 12:30 PM    I spent 30 minutes providing critical care management to this patient.   This excludes time spent in performing separately billed procedures

## 2018-10-11 NOTE — PROGRESS NOTES
Mobility Comments: Pt very SOB with completion of FM tasks. Bed mobility  Supine to Sit: Stand by assistance  Sit to Supine: Stand by assistance  Scooting: Stand by assistance  Transfers  Sit to stand: Contact guard assistance  Stand to sit: Contact guard assistance                                      Additional Activities Comment  Additional Activities: Other  Additional Activities: Ed given on d/c folder this date with G understanding. Type of ROM/Therapeutic Exercise  Type of ROM/Therapeutic Exercise: Free weights  Comment: (B)UE ther ex completed, 1# dumbells x10 reps each plane x4 variations for increased overall UE stg and endurance necessary in completing self care tasks. Pt noted to have moderate SOB throughout entire ther ex tasks. Pt educated to take frequent short RBs as measure for energy conservation. Assessment      Activity Tolerance  Activity Tolerance: Patient limited by fatigue  Safety Devices  Safety Devices in place: Yes  Type of devices: Bed alarm in place; Left in bed;Call light within reach          Plan   Plan  Times per week: 7x/wk  Times per day: Daily  Current Treatment Recommendations: Strengthening, Balance Training, Functional Mobility Training, Safety Education & Training, Self-Care / ADL  G-Code     OutComes Score                                           AM-PAC Score             Goals  Short term goals  Time Frame for Short term goals: 10 days  Short term goal 1: Pt. will tolerate 15 mins of BUE ther ex/act with min SOB to increase overall strength/activity tolerance for fxl tasks. Short term goal 2: Pt. will complete self care routine with SUP with use of EC techs PRN to increase (I). Short term goal 3: Pt. will demo G safety awareness during functional ADL transfers/mobility with reduced risk for falls.        Therapy Time   Individual Concurrent Group Co-treatment   Time In 1405         Time Out 1436         Minutes [id number] [facility], [place]

## 2018-10-12 LAB
ANION GAP SERPL CALCULATED.3IONS-SCNC: 5 MMOL/L (ref 9–17)
BUN BLDV-MCNC: 21 MG/DL (ref 6–20)
BUN/CREAT BLD: 51 (ref 9–20)
CALCIUM SERPL-MCNC: 8.9 MG/DL (ref 8.6–10.4)
CHLORIDE BLD-SCNC: 99 MMOL/L (ref 98–107)
CO2: 35 MMOL/L (ref 20–31)
CREAT SERPL-MCNC: 0.41 MG/DL (ref 0.7–1.2)
GFR AFRICAN AMERICAN: >60 ML/MIN
GFR NON-AFRICAN AMERICAN: >60 ML/MIN
GFR SERPL CREATININE-BSD FRML MDRD: ABNORMAL ML/MIN/{1.73_M2}
GFR SERPL CREATININE-BSD FRML MDRD: ABNORMAL ML/MIN/{1.73_M2}
GLUCOSE BLD-MCNC: 104 MG/DL (ref 70–99)
POTASSIUM SERPL-SCNC: 4.2 MMOL/L (ref 3.7–5.3)
SODIUM BLD-SCNC: 139 MMOL/L (ref 135–144)

## 2018-10-12 PROCEDURE — 2580000003 HC RX 258: Performed by: PHYSICIAN ASSISTANT

## 2018-10-12 PROCEDURE — G0009 ADMIN PNEUMOCOCCAL VACCINE: HCPCS | Performed by: INTERNAL MEDICINE

## 2018-10-12 PROCEDURE — 36415 COLL VENOUS BLD VENIPUNCTURE: CPT

## 2018-10-12 PROCEDURE — 6360000002 HC RX W HCPCS: Performed by: INTERNAL MEDICINE

## 2018-10-12 PROCEDURE — 90670 PCV13 VACCINE IM: CPT | Performed by: INTERNAL MEDICINE

## 2018-10-12 PROCEDURE — 97110 THERAPEUTIC EXERCISES: CPT

## 2018-10-12 PROCEDURE — 6370000000 HC RX 637 (ALT 250 FOR IP): Performed by: INTERNAL MEDICINE

## 2018-10-12 PROCEDURE — 97530 THERAPEUTIC ACTIVITIES: CPT

## 2018-10-12 PROCEDURE — 97116 GAIT TRAINING THERAPY: CPT

## 2018-10-12 PROCEDURE — 94640 AIRWAY INHALATION TREATMENT: CPT

## 2018-10-12 PROCEDURE — 94660 CPAP INITIATION&MGMT: CPT

## 2018-10-12 PROCEDURE — 80048 BASIC METABOLIC PNL TOTAL CA: CPT

## 2018-10-12 PROCEDURE — 1200000000 HC SEMI PRIVATE

## 2018-10-12 PROCEDURE — 2700000000 HC OXYGEN THERAPY PER DAY

## 2018-10-12 PROCEDURE — 97535 SELF CARE MNGMENT TRAINING: CPT

## 2018-10-12 RX ADMIN — DOXYCYCLINE HYCLATE 100 MG: 100 CAPSULE ORAL at 17:17

## 2018-10-12 RX ADMIN — PSEUDOEPHEDRINE HYDROCHLORIDE 120 MG: 120 TABLET, FILM COATED, EXTENDED RELEASE ORAL at 05:20

## 2018-10-12 RX ADMIN — CEFUROXIME AXETIL 500 MG: 250 TABLET ORAL at 09:44

## 2018-10-12 RX ADMIN — APIXABAN 5 MG: 5 TABLET, FILM COATED ORAL at 20:46

## 2018-10-12 RX ADMIN — DOXYCYCLINE HYCLATE 100 MG: 100 CAPSULE ORAL at 05:19

## 2018-10-12 RX ADMIN — METHYLPREDNISOLONE SODIUM SUCCINATE 60 MG: 125 INJECTION, POWDER, FOR SOLUTION INTRAMUSCULAR; INTRAVENOUS at 17:19

## 2018-10-12 RX ADMIN — TAMSULOSIN HYDROCHLORIDE 0.4 MG: 0.4 CAPSULE ORAL at 09:44

## 2018-10-12 RX ADMIN — FAMOTIDINE 20 MG: 20 TABLET ORAL at 20:46

## 2018-10-12 RX ADMIN — METHYLPREDNISOLONE SODIUM SUCCINATE 60 MG: 125 INJECTION, POWDER, FOR SOLUTION INTRAMUSCULAR; INTRAVENOUS at 05:20

## 2018-10-12 RX ADMIN — PSEUDOEPHEDRINE HYDROCHLORIDE 120 MG: 120 TABLET, FILM COATED, EXTENDED RELEASE ORAL at 17:17

## 2018-10-12 RX ADMIN — CITALOPRAM HYDROBROMIDE 40 MG: 20 TABLET ORAL at 09:44

## 2018-10-12 RX ADMIN — IPRATROPIUM BROMIDE AND ALBUTEROL SULFATE 1 AMPULE: .5; 3 SOLUTION RESPIRATORY (INHALATION) at 13:03

## 2018-10-12 RX ADMIN — CEFUROXIME AXETIL 500 MG: 250 TABLET ORAL at 20:46

## 2018-10-12 RX ADMIN — PNEUMOCOCCAL 13-VALENT CONJUGATE VACCINE 0.5 ML: 2.2; 2.2; 2.2; 2.2; 2.2; 4.4; 2.2; 2.2; 2.2; 2.2; 2.2; 2.2; 2.2 INJECTION, SUSPENSION INTRAMUSCULAR at 09:45

## 2018-10-12 RX ADMIN — ALPRAZOLAM 0.5 MG: 0.5 TABLET ORAL at 12:45

## 2018-10-12 RX ADMIN — MOMETASONE FUROATE AND FORMOTEROL FUMARATE DIHYDRATE 2 PUFF: 200; 5 AEROSOL RESPIRATORY (INHALATION) at 13:03

## 2018-10-12 RX ADMIN — IPRATROPIUM BROMIDE AND ALBUTEROL SULFATE 1 AMPULE: .5; 3 SOLUTION RESPIRATORY (INHALATION) at 19:26

## 2018-10-12 RX ADMIN — HYDROCODONE BITARTRATE AND ACETAMINOPHEN 1 TABLET: 5; 325 TABLET ORAL at 04:04

## 2018-10-12 RX ADMIN — MOMETASONE FUROATE AND FORMOTEROL FUMARATE DIHYDRATE 2 PUFF: 200; 5 AEROSOL RESPIRATORY (INHALATION) at 19:25

## 2018-10-12 RX ADMIN — POTASSIUM CHLORIDE 20 MEQ: 20 TABLET, EXTENDED RELEASE ORAL at 09:44

## 2018-10-12 RX ADMIN — MONTELUKAST SODIUM 10 MG: 10 TABLET, FILM COATED ORAL at 09:44

## 2018-10-12 RX ADMIN — FAMOTIDINE 20 MG: 20 TABLET ORAL at 09:45

## 2018-10-12 RX ADMIN — SODIUM CHLORIDE: 9 INJECTION, SOLUTION INTRAVENOUS at 10:18

## 2018-10-12 RX ADMIN — ALBUTEROL SULFATE 2.5 MG: 2.5 SOLUTION RESPIRATORY (INHALATION) at 05:55

## 2018-10-12 RX ADMIN — APIXABAN 5 MG: 5 TABLET, FILM COATED ORAL at 09:45

## 2018-10-12 RX ADMIN — DILTIAZEM HYDROCHLORIDE 180 MG: 180 CAPSULE, COATED, EXTENDED RELEASE ORAL at 09:45

## 2018-10-12 ASSESSMENT — PAIN DESCRIPTION - DESCRIPTORS
DESCRIPTORS: CONSTANT
DESCRIPTORS: CONSTANT

## 2018-10-12 ASSESSMENT — PAIN DESCRIPTION - ORIENTATION
ORIENTATION: LEFT
ORIENTATION: LEFT

## 2018-10-12 ASSESSMENT — PAIN DESCRIPTION - LOCATION: LOCATION: RIB CAGE

## 2018-10-12 ASSESSMENT — PAIN SCALES - GENERAL
PAINLEVEL_OUTOF10: 4
PAINLEVEL_OUTOF10: 0
PAINLEVEL_OUTOF10: 5
PAINLEVEL_OUTOF10: 4

## 2018-10-12 ASSESSMENT — PAIN DESCRIPTION - FREQUENCY: FREQUENCY: CONTINUOUS

## 2018-10-12 ASSESSMENT — PAIN DESCRIPTION - PAIN TYPE
TYPE: ACUTE PAIN
TYPE: ACUTE PAIN

## 2018-10-12 NOTE — PLAN OF CARE
Problem: Pain:  Goal: Pain level will decrease  Pain level will decrease   Outcome: Ongoing  Assess patients pain every four hours and as needed using the 1-10 pain scale. Patient encouraged to reposition every two hours and as needed. Medications as prescribed by physician. Alternatives to pain medications provided as tolerated. Will continue to monitor. Problem: Falls - Risk of:  Goal: Will remain free from falls  Will remain free from falls   Outcome: Ongoing  Patient is alert and oriented and has demonstrated the use of using the call light for assistance before getting up. Patient ambulates with +1 assistance with walker. Education has been provided to defer the use of the bed alarm and/or restraint free alarm as the patient has shown competency in calling for assistance and verbalizing the risk. Falling star program in use with fall band in place. Non skid socks are worn by patient. Call light, and bed side table along with personal belongings are within reach of patient. Will continue to monitor. Problem: Breathing Pattern - Ineffective:  Goal: Ability to achieve and maintain a regular respiratory rate will improve  Ability to achieve and maintain a regular respiratory rate will improve   Outcome: Ongoing  Respiratory rate remains within normal limits. Patient does get SOB with exertion. Patient wears oxygen nasal cannula while out of bed and on BiPAP when in bed. Will continue to monitor. Problem: Gas Exchange - Impaired:  Goal: Levels of oxygenation will improve  Levels of oxygenation will improve   Outcome: Ongoing  Patient 95% at this time while on BiPAP with FIO2 at 35%. Will continue to monitor.

## 2018-10-12 NOTE — PROGRESS NOTES
Occupational Therapy  Facility/Department: [facility] MED SURG  Daily Treatment Note  NAME: Hitesh Sahu  : 1965  MRN: [id number]    Date of Service: 10/12/2018    Discharge Recommendations:  Continue to assess pending progress       Patient Diagnosis(es): The primary encounter diagnosis was Hypercapnia. A diagnosis of Chest pain, unspecified type was also pertinent to this visit. has a past medical history of Arthritis; COPD (chronic obstructive pulmonary disease) ([address] Hypertension; Oxygen dependent; and Scoliosis. has a past surgical history that includes Tonsillectomy; other surgical history (Left, 2017); and Facial reconstruction surgery (Left, 2017). Restrictions  Restrictions/Precautions  Restrictions/Precautions: General Precautions, Fall Risk  Subjective   General  Chart Reviewed: Yes  Patient assessed for rehabilitation services?: Yes  Response to previous treatment: Patient with no complaints from previous session  Family / Caregiver Present: No  Diagnosis: COPD exacerbation  Subjective  Subjective: Pt eating lunch upon [facility] arrival, agreed to shower, c/o Max SOB. General Comment  Comments: Pt reports overall increased fatigue this date, but agreed to complete ther ex with HOB raised. Pt declined to complete any basic self care tasks. Pain Assessment  Patient Currently in Pain: Yes  Pain Level: 4  Pain Type: Acute pain  Pain Location: Rib cage  Pain Orientation: Left  Pain Descriptors: Constant  Pain Frequency: Continuous  Vital Signs  Patient Currently in Pain: Yes   Orientation     Objective    ADL  Feeding: Independent  Grooming: Stand by assistance  UE Bathing: Stand by assistance  LE Bathing: Stand by assistance  UE Dressing: Stand by assistance  LE Dressing: Stand by assistance  Toileting: Stand by assistance; Increased time to complete  Additional Comments: max SOB through out showering/toileting        Balance  Sitting Balance: Independent  Standing Balance: Stand by

## 2018-10-13 LAB
ANION GAP SERPL CALCULATED.3IONS-SCNC: 6 MMOL/L (ref 9–17)
BUN BLDV-MCNC: 18 MG/DL (ref 6–20)
BUN/CREAT BLD: 40 (ref 9–20)
CALCIUM SERPL-MCNC: 8.9 MG/DL (ref 8.6–10.4)
CHLORIDE BLD-SCNC: 96 MMOL/L (ref 98–107)
CO2: 35 MMOL/L (ref 20–31)
CREAT SERPL-MCNC: 0.45 MG/DL (ref 0.7–1.2)
CULTURE: NORMAL
CULTURE: NORMAL
GFR AFRICAN AMERICAN: >60 ML/MIN
GFR NON-AFRICAN AMERICAN: >60 ML/MIN
GFR SERPL CREATININE-BSD FRML MDRD: ABNORMAL ML/MIN/{1.73_M2}
GFR SERPL CREATININE-BSD FRML MDRD: ABNORMAL ML/MIN/{1.73_M2}
GLUCOSE BLD-MCNC: 103 MG/DL (ref 70–99)
Lab: NORMAL
Lab: NORMAL
POTASSIUM SERPL-SCNC: 4.2 MMOL/L (ref 3.7–5.3)
SODIUM BLD-SCNC: 137 MMOL/L (ref 135–144)
SPECIMEN DESCRIPTION: NORMAL
SPECIMEN DESCRIPTION: NORMAL
STATUS: NORMAL
STATUS: NORMAL

## 2018-10-13 PROCEDURE — 97116 GAIT TRAINING THERAPY: CPT

## 2018-10-13 PROCEDURE — 2580000003 HC RX 258: Performed by: INTERNAL MEDICINE

## 2018-10-13 PROCEDURE — 2580000003 HC RX 258: Performed by: PHYSICIAN ASSISTANT

## 2018-10-13 PROCEDURE — 94660 CPAP INITIATION&MGMT: CPT

## 2018-10-13 PROCEDURE — 36415 COLL VENOUS BLD VENIPUNCTURE: CPT

## 2018-10-13 PROCEDURE — 2700000000 HC OXYGEN THERAPY PER DAY

## 2018-10-13 PROCEDURE — 80048 BASIC METABOLIC PNL TOTAL CA: CPT

## 2018-10-13 PROCEDURE — 94664 DEMO&/EVAL PT USE INHALER: CPT

## 2018-10-13 PROCEDURE — 1200000000 HC SEMI PRIVATE

## 2018-10-13 PROCEDURE — 6360000002 HC RX W HCPCS: Performed by: INTERNAL MEDICINE

## 2018-10-13 PROCEDURE — 6370000000 HC RX 637 (ALT 250 FOR IP): Performed by: INTERNAL MEDICINE

## 2018-10-13 PROCEDURE — 94640 AIRWAY INHALATION TREATMENT: CPT

## 2018-10-13 PROCEDURE — 97535 SELF CARE MNGMENT TRAINING: CPT

## 2018-10-13 PROCEDURE — 97110 THERAPEUTIC EXERCISES: CPT

## 2018-10-13 RX ORDER — FUROSEMIDE 10 MG/ML
40 INJECTION INTRAMUSCULAR; INTRAVENOUS ONCE
Status: COMPLETED | OUTPATIENT
Start: 2018-10-13 | End: 2018-10-13

## 2018-10-13 RX ADMIN — CITALOPRAM HYDROBROMIDE 40 MG: 20 TABLET ORAL at 08:35

## 2018-10-13 RX ADMIN — CEFUROXIME AXETIL 500 MG: 250 TABLET ORAL at 08:35

## 2018-10-13 RX ADMIN — DILTIAZEM HYDROCHLORIDE 180 MG: 180 CAPSULE, COATED, EXTENDED RELEASE ORAL at 08:35

## 2018-10-13 RX ADMIN — Medication 10 ML: at 20:11

## 2018-10-13 RX ADMIN — METHYLPREDNISOLONE SODIUM SUCCINATE 60 MG: 125 INJECTION, POWDER, FOR SOLUTION INTRAMUSCULAR; INTRAVENOUS at 17:27

## 2018-10-13 RX ADMIN — POTASSIUM CHLORIDE 20 MEQ: 20 TABLET, EXTENDED RELEASE ORAL at 08:34

## 2018-10-13 RX ADMIN — CEFUROXIME AXETIL 500 MG: 250 TABLET ORAL at 20:10

## 2018-10-13 RX ADMIN — MONTELUKAST SODIUM 10 MG: 10 TABLET, FILM COATED ORAL at 08:35

## 2018-10-13 RX ADMIN — APIXABAN 5 MG: 5 TABLET, FILM COATED ORAL at 20:10

## 2018-10-13 RX ADMIN — APIXABAN 5 MG: 5 TABLET, FILM COATED ORAL at 08:34

## 2018-10-13 RX ADMIN — ALPRAZOLAM 0.5 MG: 0.5 TABLET ORAL at 20:10

## 2018-10-13 RX ADMIN — PSEUDOEPHEDRINE HYDROCHLORIDE 120 MG: 120 TABLET, FILM COATED, EXTENDED RELEASE ORAL at 17:27

## 2018-10-13 RX ADMIN — DOXYCYCLINE HYCLATE 100 MG: 100 CAPSULE ORAL at 17:27

## 2018-10-13 RX ADMIN — MOMETASONE FUROATE AND FORMOTEROL FUMARATE DIHYDRATE 2 PUFF: 200; 5 AEROSOL RESPIRATORY (INHALATION) at 08:51

## 2018-10-13 RX ADMIN — DOXYCYCLINE HYCLATE 100 MG: 100 CAPSULE ORAL at 05:19

## 2018-10-13 RX ADMIN — PSEUDOEPHEDRINE HYDROCHLORIDE 120 MG: 120 TABLET, FILM COATED, EXTENDED RELEASE ORAL at 05:19

## 2018-10-13 RX ADMIN — IPRATROPIUM BROMIDE AND ALBUTEROL SULFATE 1 AMPULE: .5; 3 SOLUTION RESPIRATORY (INHALATION) at 15:21

## 2018-10-13 RX ADMIN — FUROSEMIDE 40 MG: 10 INJECTION, SOLUTION INTRAMUSCULAR; INTRAVENOUS at 13:27

## 2018-10-13 RX ADMIN — IPRATROPIUM BROMIDE AND ALBUTEROL SULFATE 1 AMPULE: .5; 3 SOLUTION RESPIRATORY (INHALATION) at 08:51

## 2018-10-13 RX ADMIN — METHYLPREDNISOLONE SODIUM SUCCINATE 60 MG: 125 INJECTION, POWDER, FOR SOLUTION INTRAMUSCULAR; INTRAVENOUS at 05:20

## 2018-10-13 RX ADMIN — IPRATROPIUM BROMIDE AND ALBUTEROL SULFATE 1 AMPULE: .5; 3 SOLUTION RESPIRATORY (INHALATION) at 19:15

## 2018-10-13 RX ADMIN — HYDROCODONE BITARTRATE AND ACETAMINOPHEN 1 TABLET: 5; 325 TABLET ORAL at 13:57

## 2018-10-13 RX ADMIN — MOMETASONE FUROATE AND FORMOTEROL FUMARATE DIHYDRATE 2 PUFF: 200; 5 AEROSOL RESPIRATORY (INHALATION) at 19:20

## 2018-10-13 RX ADMIN — TAMSULOSIN HYDROCHLORIDE 0.4 MG: 0.4 CAPSULE ORAL at 08:34

## 2018-10-13 RX ADMIN — SODIUM CHLORIDE: 9 INJECTION, SOLUTION INTRAVENOUS at 05:02

## 2018-10-13 RX ADMIN — FAMOTIDINE 20 MG: 20 TABLET ORAL at 08:35

## 2018-10-13 RX ADMIN — FAMOTIDINE 20 MG: 20 TABLET ORAL at 20:11

## 2018-10-13 ASSESSMENT — PAIN DESCRIPTION - PAIN TYPE
TYPE: ACUTE PAIN
TYPE: ACUTE PAIN

## 2018-10-13 ASSESSMENT — PAIN SCALES - GENERAL
PAINLEVEL_OUTOF10: 0
PAINLEVEL_OUTOF10: 4
PAINLEVEL_OUTOF10: 0
PAINLEVEL_OUTOF10: 9

## 2018-10-13 ASSESSMENT — PAIN DESCRIPTION - DESCRIPTORS
DESCRIPTORS: HEADACHE
DESCRIPTORS: ACHING

## 2018-10-13 ASSESSMENT — PAIN DESCRIPTION - ORIENTATION: ORIENTATION: LEFT

## 2018-10-13 ASSESSMENT — PAIN DESCRIPTION - FREQUENCY: FREQUENCY: INTERMITTENT

## 2018-10-13 ASSESSMENT — PAIN DESCRIPTION - LOCATION
LOCATION: RIB CAGE
LOCATION: HEAD

## 2018-10-13 NOTE — PROGRESS NOTES
Occupational Therapy  Facility/Department: [facility] AT THE [facility] MED SURG  Daily Treatment Note  NAME: Carole Allan  : 1965  MRN: [id number]    Date of Service: 10/13/2018    Discharge Recommendations:  Continue to assess pending progress       Patient Diagnosis(es): The primary encounter diagnosis was Hypercapnia. A diagnosis of Chest pain, unspecified type was also pertinent to this visit. has a past medical history of Arthritis; COPD (chronic obstructive pulmonary disease) ([address] Hypertension; Oxygen dependent; and Scoliosis. has a past surgical history that includes Tonsillectomy; other surgical history (Left, 2017); and Facial reconstruction surgery (Left, 2017). Restrictions  Restrictions/Precautions  Restrictions/Precautions: General Precautions, Fall Risk  Subjective   General  Chart Reviewed: Yes  Patient assessed for rehabilitation services?: Yes  Response to previous treatment: Patient with no complaints from previous session  Family / Caregiver Present: No  Diagnosis: COPD exacerbation  Subjective  Subjective: Pt supine upon [facility] arrival & agreed to ADLs as long as he could complete ADL  General Comment  Comments: Pt reports increased fatigue & SOB this date. Patient reported he is going to AWD later on today. Pain Assessment  Patient Currently in Pain: Denies  Vital Signs  Patient Currently in Pain: Denies   Orientation  Orientation  Overall Orientation Status: Within Functional Limits  Objective    ADL  Grooming: Setup  UE Bathing: Setup  LE Bathing: Setup  UE Dressing: Minimal assistance (due to IV line)  Additional Comments: Patient completed ADL & grooming task seated EOB. SOB reported edu pt on breathing technique with F+ return. Increased time to complete due to SOB.         Balance  Sitting Balance: Independent  Standing Balance:  (SBA/CGA for safety, no LOB noted)  Standing Balance  Sit to stand: Stand by assistance  Stand to sit: Stand by assistance  Functional

## 2018-10-13 NOTE — PROGRESS NOTES
Physical Therapy  Facility/Department: [facility] AT THE [facility] MED SURG  Daily Treatment Note  NAME: Stella Gasca  : 1965  MRN: [id number]    Date of Service: 10/13/2018    Discharge Recommendations:  Continue to assess pending progress        Patient Diagnosis(es): The primary encounter diagnosis was Hypercapnia. A diagnosis of Chest pain, unspecified type was also pertinent to this visit. has a past medical history of Arthritis; COPD (chronic obstructive pulmonary disease) ([address] Hypertension; Oxygen dependent; and Scoliosis. has a past surgical history that includes Tonsillectomy; other surgical history (Left, 2017); and Facial reconstruction surgery (Left, 2017). Restrictions  Restrictions/Precautions  Restrictions/Precautions: General Precautions, Fall Risk  Subjective   General  Chart Reviewed: Yes  Response To Previous Treatment: Patient reporting fatigue but able to participate. Family / Caregiver Present: No  Referring Practitioner: Dr. Margie Nagel   Subjective  Subjective: Pt. reported no pain this date          Orientation  Orientation  Overall Orientation Status: Within Functional Limits  Objective   Bed mobility  Sit to Supine: Stand by assistance  Scooting: Stand by assistance  Transfers  Sit to Stand: Stand by assistance;Contact guard assistance  Stand to sit: Stand by assistance;Contact guard assistance  Comment: Pt. jeanne'flor safe technique with no v/c's required. Pt. required assistance with O2 tubing and iv pole. Ambulation  Ambulation?: Yes  WB Status: unrestricted   Ambulation 1  Surface: level tile  Device: Rolling Walker  Other Apparatus: O2  Assistance: Stand by assistance;Contact guard assistance  Quality of Gait: Pt. leaned on FWW with B UE's to assist breathing during ambulation and required 1 standing rest break after 30 ft. Distance: 15 feet x 4  Comments: Pt. jeanne'flor improved endurance this date with SPO2 85% after amb that increased quickly to 95% with seated rest break.  Pt. required min v/c's for proper breathing technique. Balance  Posture: Fair  Sitting - Static: Good  Sitting - Dynamic: Good  Standing - Static: Good  Standing - Dynamic: Fair;+  Exercises  Straight Leg Raise: 20x  Quad Sets: 20x  Heelslides: 20x  Hip Flexion: 20x  Hip Abduction: 20x  Knee Long Arc Quad: 20x  Knee Short Arc Quad: 20x  Ankle Pumps: 20x  Comments: Pt. completed exercises in sitting with 2 rest breaks due to fatigue and HERRERA. SPO2 remained at 95% throughout. Assessment   Assessment: Pt. mary. all ther ex and gait training well this date with SPO2 dropping to 85% with walking that quickly returned to 95% with seated rest break and breathing. Pt. demo'd proper use of FWW during amb. with min v/c's required. Will cont. to progress as mary. Treatment Diagnosis: general weakness   Prognosis: Fair  Patient Education: Ed. pt. verbally and with demonstation on use of FWW when ascending stairs. Pt. had good understanding. REQUIRES PT FOLLOW UP: Yes  Activity Tolerance  Activity Tolerance: Patient limited by endurance; Patient limited by fatigue       Goals  Short term goals  Time Frame for Short term goals: 10 days   Short term goal 1: Pt will be educated on his POC   Short term goal 2: Pt will perform all transfers Mod I in order to increase independence   Short term goal 3: Pt will ambulate 100 feet with least restricitive device SBA in order to return to PLOF - progressing (60 ft with FWW and SB/CGA and 1 standing rest break)  Short term goal 4: Pt will increase dynamic standing balance to Fair + to reduce fall risk     Plan    Plan  Times per week: 7x/wk   Times per day: Twice a day  Plan weeks: 2x/daily except weekends 1x/daily   Current Treatment Recommendations: Strengthening, Transfer Training, Endurance Training, Neuromuscular Re-education, Patient/Caregiver Education & Training, Balance Training, Gait Training, Home Exercise Program, Functional Mobility Training, Safety Education &

## 2018-10-13 NOTE — PLAN OF CARE
Problem: Pain:  Goal: Pain level will decrease  Pain level will decrease   Outcome: Ongoing  Patient is able to verbalize when in pain with the use of a 0-10 scale. Pain medication is administered as ordered with relief. Pain is assessed upon routine assessments and as needed. Problem: Falls - Risk of:  Goal: Will remain free from falls  Will remain free from falls   Outcome: Ongoing  Patient is alert and oriented and has demonstrated the use of using the call light for assistance before getting up. Education has been provided to defer the use of the bed alarm and/or restraint free alarm as the patient has shown competency in calling for assistance and verbalizing the risk. Problem: Gas Exchange - Impaired:  Goal: Levels of oxygenation will improve  Levels of oxygenation will improve   Outcome: Ongoing  Pt remains on 2.5L O2 via NC with sats at 96%. Encouraging patient to cough and deep breathe.   Will continue to monitor

## 2018-10-13 NOTE — PLAN OF CARE
Problem: Pain:  Goal: Pain level will decrease  Pain level will decrease   Outcome: Ongoing  Denies pain. Problem: Falls - Risk of:  Goal: Will remain free from falls  Will remain free from falls   Outcome: Ongoing  Up to BR with assistance. Gait steady. Problem: Breathing Pattern - Ineffective:  Goal: Ability to achieve and maintain a regular respiratory rate will improve  Ability to achieve and maintain a regular respiratory rate will improve   Outcome: Ongoing  Lungs diminished throughout. Respirations labored with exertion.   Oxygen on at 2 1/2 liters per NC.

## 2018-10-13 NOTE — PLAN OF CARE
Problem: Pain:  Goal: Pain level will decrease  Pain level will decrease   Outcome: Ongoing  Pt is able to verbalize when in pain. Pt denies pain at this time. Will continue to monitor. Problem: Falls - Risk of:  Goal: Will remain free from falls  Will remain free from falls   Outcome: Ongoing  Bed in low position. Wheels locked. 2/4 side rails are up. Fall band on. Call light within reach. Problem: Breathing Pattern - Ineffective:  Goal: Ability to achieve and maintain a regular respiratory rate will improve  Ability to achieve and maintain a regular respiratory rate will improve   Outcome: Ongoing  Respiratory rate is WNL, will continue to monitor. Problem: Gas Exchange - Impaired:  Goal: Levels of oxygenation will improve  Levels of oxygenation will improve   Outcome: Ongoing  SpO2 was 94% on 2.5L NC, will continue to monitor.

## 2018-10-13 NOTE — PROGRESS NOTES
with  [physician-ordered bronchodilator(s)]  QID and Albuterol PRN q4 hrs. Breath-Actuated Neb if BBS Acuity = 4, and pt. can use MP. Notify physician if condition deteriorates. MDI THERAPY with  2 actuations of [physician-ordered bronchodilator(s)] via spacer TID Albuterol and PRNq4 hrs. If unable to utilize MDI: HHN [physician-ordered bronchodilator(s)] TID and Albuterol PRN q4 hrs. Notify physician if condition deteriorates. MDI THERAPY with  [physician-ordered bronchodilator(s)] via spacer TID PRN. If unable to utilize MDI: HHN [physician-ordered bronchodilator(s)] TID PRN. Notify physician if condition deteriorates. If Acuity Level is 2, 3, or 4 in any of the following:    [] COUGH     [] SURGICAL HISTORY (SURG HX)  [] CHEST XRAY (CXR)    Goal: Improvement in sputum mobilization in patients with ineffective airway clearance. Reverse atelectasis. [] Bronchopulmonary Hygiene Protocol    Total Acuity:   16-32  []  Secondary Assessment in 24 hrs Total Acuity:  9-15  []  Secondary Assessment in 24 hrs Total Acuity:  4-8  []  Secondary Assessment in 48 hrs Total Acuity:  0-3  []  Secondary Assessment in 72 hrs   METANEB QID with [physician-ordered bronchodilator(s)] if CXR Acuity = 4; otherwise:  PD&P, PEP, or Vest QID & PRN  NT Sxn PRN for ineffective cough  METANEB QID with [physician-ordered bronchodilator(s)] if CXR Acuity = 4; otherwise:  PD&P, PEP, or Vest TID & PRN  NT Sxn PRN for ineffective cough  Instruct patient to self-perform IS q1hr WA   Directed Cough self-performed q1hr WA     If Acuity Level is 2 or above in the following:    [] PULMONARY HISTORY (PULM HX)    Goal: Assist patient in quitting smoking to slow or stop the progression of lung disease.     [] Smoking Cessation Protocol    SMOKING CESSATION EDUCATION provided according to policy NF_225: (delgado with an X)  ____Yes    ____ No     ____ NA    Smoking Cessation Booklet given:  ____Yes  ____No ____Patient Padmini Shall

## 2018-10-14 LAB
ANION GAP SERPL CALCULATED.3IONS-SCNC: 5 MMOL/L (ref 9–17)
BUN BLDV-MCNC: 21 MG/DL (ref 6–20)
BUN/CREAT BLD: 38 (ref 9–20)
CALCIUM SERPL-MCNC: 9.2 MG/DL (ref 8.6–10.4)
CHLORIDE BLD-SCNC: 95 MMOL/L (ref 98–107)
CO2: 41 MMOL/L (ref 20–31)
CREAT SERPL-MCNC: 0.56 MG/DL (ref 0.7–1.2)
GFR AFRICAN AMERICAN: >60 ML/MIN
GFR NON-AFRICAN AMERICAN: >60 ML/MIN
GFR SERPL CREATININE-BSD FRML MDRD: ABNORMAL ML/MIN/{1.73_M2}
GFR SERPL CREATININE-BSD FRML MDRD: ABNORMAL ML/MIN/{1.73_M2}
GLUCOSE BLD-MCNC: 111 MG/DL (ref 70–99)
POTASSIUM SERPL-SCNC: 4.7 MMOL/L (ref 3.7–5.3)
SODIUM BLD-SCNC: 141 MMOL/L (ref 135–144)

## 2018-10-14 PROCEDURE — 94761 N-INVAS EAR/PLS OXIMETRY MLT: CPT

## 2018-10-14 PROCEDURE — 1200000000 HC SEMI PRIVATE

## 2018-10-14 PROCEDURE — 36415 COLL VENOUS BLD VENIPUNCTURE: CPT

## 2018-10-14 PROCEDURE — 97110 THERAPEUTIC EXERCISES: CPT

## 2018-10-14 PROCEDURE — 94640 AIRWAY INHALATION TREATMENT: CPT

## 2018-10-14 PROCEDURE — 6370000000 HC RX 637 (ALT 250 FOR IP): Performed by: INTERNAL MEDICINE

## 2018-10-14 PROCEDURE — 2700000000 HC OXYGEN THERAPY PER DAY

## 2018-10-14 PROCEDURE — 97116 GAIT TRAINING THERAPY: CPT

## 2018-10-14 PROCEDURE — 2580000003 HC RX 258: Performed by: INTERNAL MEDICINE

## 2018-10-14 PROCEDURE — 80048 BASIC METABOLIC PNL TOTAL CA: CPT

## 2018-10-14 PROCEDURE — 6360000002 HC RX W HCPCS: Performed by: INTERNAL MEDICINE

## 2018-10-14 PROCEDURE — 94660 CPAP INITIATION&MGMT: CPT

## 2018-10-14 RX ORDER — FUROSEMIDE 10 MG/ML
40 INJECTION INTRAMUSCULAR; INTRAVENOUS ONCE
Status: COMPLETED | OUTPATIENT
Start: 2018-10-14 | End: 2018-10-14

## 2018-10-14 RX ADMIN — APIXABAN 5 MG: 5 TABLET, FILM COATED ORAL at 21:10

## 2018-10-14 RX ADMIN — TAMSULOSIN HYDROCHLORIDE 0.4 MG: 0.4 CAPSULE ORAL at 09:05

## 2018-10-14 RX ADMIN — CITALOPRAM HYDROBROMIDE 40 MG: 20 TABLET ORAL at 09:05

## 2018-10-14 RX ADMIN — METHYLPREDNISOLONE SODIUM SUCCINATE 60 MG: 125 INJECTION, POWDER, FOR SOLUTION INTRAMUSCULAR; INTRAVENOUS at 05:25

## 2018-10-14 RX ADMIN — MOMETASONE FUROATE AND FORMOTEROL FUMARATE DIHYDRATE 2 PUFF: 200; 5 AEROSOL RESPIRATORY (INHALATION) at 09:01

## 2018-10-14 RX ADMIN — PSEUDOEPHEDRINE HYDROCHLORIDE 120 MG: 120 TABLET, FILM COATED, EXTENDED RELEASE ORAL at 05:24

## 2018-10-14 RX ADMIN — IPRATROPIUM BROMIDE AND ALBUTEROL SULFATE 1 AMPULE: .5; 3 SOLUTION RESPIRATORY (INHALATION) at 20:27

## 2018-10-14 RX ADMIN — Medication 10 ML: at 21:11

## 2018-10-14 RX ADMIN — Medication 10 ML: at 09:05

## 2018-10-14 RX ADMIN — Medication 10 ML: at 11:28

## 2018-10-14 RX ADMIN — IPRATROPIUM BROMIDE AND ALBUTEROL SULFATE 1 AMPULE: .5; 3 SOLUTION RESPIRATORY (INHALATION) at 15:28

## 2018-10-14 RX ADMIN — POTASSIUM CHLORIDE 20 MEQ: 20 TABLET, EXTENDED RELEASE ORAL at 09:05

## 2018-10-14 RX ADMIN — FAMOTIDINE 20 MG: 20 TABLET ORAL at 21:10

## 2018-10-14 RX ADMIN — MOMETASONE FUROATE AND FORMOTEROL FUMARATE DIHYDRATE 2 PUFF: 200; 5 AEROSOL RESPIRATORY (INHALATION) at 20:27

## 2018-10-14 RX ADMIN — CEFUROXIME AXETIL 500 MG: 250 TABLET ORAL at 21:10

## 2018-10-14 RX ADMIN — CEFUROXIME AXETIL 500 MG: 250 TABLET ORAL at 09:05

## 2018-10-14 RX ADMIN — DOXYCYCLINE HYCLATE 100 MG: 100 CAPSULE ORAL at 05:24

## 2018-10-14 RX ADMIN — MONTELUKAST SODIUM 10 MG: 10 TABLET, FILM COATED ORAL at 09:05

## 2018-10-14 RX ADMIN — DILTIAZEM HYDROCHLORIDE 180 MG: 180 CAPSULE, COATED, EXTENDED RELEASE ORAL at 09:05

## 2018-10-14 RX ADMIN — DOXYCYCLINE HYCLATE 100 MG: 100 CAPSULE ORAL at 17:37

## 2018-10-14 RX ADMIN — PSEUDOEPHEDRINE HYDROCHLORIDE 120 MG: 120 TABLET, FILM COATED, EXTENDED RELEASE ORAL at 17:44

## 2018-10-14 RX ADMIN — FAMOTIDINE 20 MG: 20 TABLET ORAL at 09:05

## 2018-10-14 RX ADMIN — HYDROCODONE BITARTRATE AND ACETAMINOPHEN 1 TABLET: 5; 325 TABLET ORAL at 07:45

## 2018-10-14 RX ADMIN — METHYLPREDNISOLONE SODIUM SUCCINATE 60 MG: 125 INJECTION, POWDER, FOR SOLUTION INTRAMUSCULAR; INTRAVENOUS at 17:37

## 2018-10-14 RX ADMIN — ALPRAZOLAM 0.5 MG: 0.5 TABLET ORAL at 21:10

## 2018-10-14 RX ADMIN — APIXABAN 5 MG: 5 TABLET, FILM COATED ORAL at 09:05

## 2018-10-14 RX ADMIN — IPRATROPIUM BROMIDE AND ALBUTEROL SULFATE 1 AMPULE: .5; 3 SOLUTION RESPIRATORY (INHALATION) at 09:01

## 2018-10-14 RX ADMIN — FUROSEMIDE 40 MG: 10 INJECTION, SOLUTION INTRAMUSCULAR; INTRAVENOUS at 11:28

## 2018-10-14 ASSESSMENT — PAIN DESCRIPTION - ORIENTATION: ORIENTATION: LEFT

## 2018-10-14 ASSESSMENT — PAIN SCALES - GENERAL
PAINLEVEL_OUTOF10: 0
PAINLEVEL_OUTOF10: 0
PAINLEVEL_OUTOF10: 9
PAINLEVEL_OUTOF10: 4

## 2018-10-14 ASSESSMENT — PAIN DESCRIPTION - PAIN TYPE
TYPE: ACUTE PAIN
TYPE: ACUTE PAIN

## 2018-10-14 ASSESSMENT — PAIN DESCRIPTION - DESCRIPTORS: DESCRIPTORS: HEADACHE;STABBING

## 2018-10-14 ASSESSMENT — PAIN DESCRIPTION - LOCATION
LOCATION: RIB CAGE
LOCATION: HEAD

## 2018-10-14 ASSESSMENT — PAIN DESCRIPTION - FREQUENCY: FREQUENCY: INTERMITTENT

## 2018-10-14 NOTE — PROGRESS NOTES
Phone: [phone] [place]  Date: 10/14/2018  Fax: [phone]      Physical Therapy    Daily Note    Patient Name: Morgan Little      : 1965  (48 y.o.)  MRN: [id number]     [] Patient requires additional Physical Therapy    [] Anticipate Physical Therapy Discharge Soon     Assessment              Scooting: Supervision    Sit to Stand: Stand by assistance  Stand to sit: Stand by assistance             WB Status: unrestricted   Ambulation 1  Surface: level tile  Device: Rolling Walker  Other Apparatus: O2  Assistance: Stand by assistance (needed assist for O2 tubing)  Quality of Gait: Pt leaned on wheeled walker with B UE's and needed no rest breaks  Distance: 60 feet x 1  Comments: Pt SPO2 stayed at or around 90% most of session. After ambulation he was at 90%. Assessment: Pt tolerated all exercises and gait well this date. His SPO2 dropped to 85% during exercises but quickly returned to over 90% with seated rest break. Amb with WW with little to no v/c needed.     Discharge Recommendations: Continue to assess pending progress            Exercises:  See Flowsheets    Plan  Cont Per Plan Of Care    Goals  Short Term Goals  Short term goal 1: Pt will be educated on his POC   Short term goal 2: Pt will perform all transfers Mod I in order to increase independence   Short term goal 3: Pt will ambulate 100 feet with least restricitive device SBA in order to return to PLOF - progressing (60 ft with FWW and SB/CGA and 1 standing rest break)  Short term goal 4: Pt will increase dynamic standing balance to Fair + to reduce fall risk        Long Term Goals                   Che Roman PTA      Date: 10/14/2018

## 2018-10-15 VITALS
HEIGHT: 64 IN | HEART RATE: 89 BPM | OXYGEN SATURATION: 94 % | BODY MASS INDEX: 18.06 KG/M2 | TEMPERATURE: 97.5 F | SYSTOLIC BLOOD PRESSURE: 117 MMHG | RESPIRATION RATE: 18 BRPM | DIASTOLIC BLOOD PRESSURE: 83 MMHG | WEIGHT: 105.8 LBS

## 2018-10-15 LAB
ANION GAP SERPL CALCULATED.3IONS-SCNC: 6 MMOL/L (ref 9–17)
BUN BLDV-MCNC: 21 MG/DL (ref 6–20)
BUN/CREAT BLD: 43 (ref 9–20)
CALCIUM SERPL-MCNC: 9.2 MG/DL (ref 8.6–10.4)
CHLORIDE BLD-SCNC: 95 MMOL/L (ref 98–107)
CO2: 40 MMOL/L (ref 20–31)
CREAT SERPL-MCNC: 0.49 MG/DL (ref 0.7–1.2)
GFR AFRICAN AMERICAN: >60 ML/MIN
GFR NON-AFRICAN AMERICAN: >60 ML/MIN
GFR SERPL CREATININE-BSD FRML MDRD: ABNORMAL ML/MIN/{1.73_M2}
GFR SERPL CREATININE-BSD FRML MDRD: ABNORMAL ML/MIN/{1.73_M2}
GLUCOSE BLD-MCNC: 100 MG/DL (ref 70–99)
POTASSIUM SERPL-SCNC: 4.6 MMOL/L (ref 3.7–5.3)
SODIUM BLD-SCNC: 141 MMOL/L (ref 135–144)

## 2018-10-15 PROCEDURE — 36415 COLL VENOUS BLD VENIPUNCTURE: CPT

## 2018-10-15 PROCEDURE — 97116 GAIT TRAINING THERAPY: CPT

## 2018-10-15 PROCEDURE — 94761 N-INVAS EAR/PLS OXIMETRY MLT: CPT

## 2018-10-15 PROCEDURE — 94660 CPAP INITIATION&MGMT: CPT

## 2018-10-15 PROCEDURE — 2700000000 HC OXYGEN THERAPY PER DAY

## 2018-10-15 PROCEDURE — 6370000000 HC RX 637 (ALT 250 FOR IP): Performed by: INTERNAL MEDICINE

## 2018-10-15 PROCEDURE — 97110 THERAPEUTIC EXERCISES: CPT

## 2018-10-15 PROCEDURE — 6360000002 HC RX W HCPCS: Performed by: INTERNAL MEDICINE

## 2018-10-15 PROCEDURE — 80048 BASIC METABOLIC PNL TOTAL CA: CPT

## 2018-10-15 PROCEDURE — 2580000003 HC RX 258: Performed by: INTERNAL MEDICINE

## 2018-10-15 PROCEDURE — 94640 AIRWAY INHALATION TREATMENT: CPT

## 2018-10-15 RX ORDER — DOXYCYCLINE HYCLATE 100 MG/1
100 CAPSULE ORAL EVERY 12 HOURS
Qty: 14 CAPSULE | Refills: 0 | DISCHARGE
Start: 2018-10-15 | End: 2018-10-22

## 2018-10-15 RX ORDER — PREDNISONE 10 MG/1
TABLET ORAL
Qty: 40 TABLET | Refills: 0 | Status: ON HOLD | DISCHARGE
Start: 2018-10-15 | End: 2018-11-16 | Stop reason: HOSPADM

## 2018-10-15 RX ORDER — HYDROCODONE BITARTRATE AND ACETAMINOPHEN 5; 325 MG/1; MG/1
1 TABLET ORAL EVERY 6 HOURS PRN
Qty: 20 TABLET | Refills: 0 | Status: SHIPPED | OUTPATIENT
Start: 2018-10-15 | End: 2018-10-20

## 2018-10-15 RX ORDER — ALPRAZOLAM 0.5 MG/1
0.5 TABLET ORAL 2 TIMES DAILY PRN
Qty: 10 TABLET | Refills: 0 | Status: SHIPPED | OUTPATIENT
Start: 2018-10-15 | End: 2018-10-25

## 2018-10-15 RX ORDER — CEFUROXIME AXETIL 500 MG/1
500 TABLET ORAL 2 TIMES DAILY
Qty: 14 TABLET | Refills: 0 | DISCHARGE
Start: 2018-10-15 | End: 2018-10-22

## 2018-10-15 RX ORDER — FUROSEMIDE 40 MG/1
40 TABLET ORAL DAILY
Qty: 7 TABLET | Refills: 0 | Status: ON HOLD | DISCHARGE
Start: 2018-10-15 | End: 2018-11-16

## 2018-10-15 RX ADMIN — FAMOTIDINE 20 MG: 20 TABLET ORAL at 08:19

## 2018-10-15 RX ADMIN — TAMSULOSIN HYDROCHLORIDE 0.4 MG: 0.4 CAPSULE ORAL at 08:19

## 2018-10-15 RX ADMIN — CEFUROXIME AXETIL 500 MG: 250 TABLET ORAL at 08:18

## 2018-10-15 RX ADMIN — DILTIAZEM HYDROCHLORIDE 180 MG: 180 CAPSULE, COATED, EXTENDED RELEASE ORAL at 08:19

## 2018-10-15 RX ADMIN — MOMETASONE FUROATE AND FORMOTEROL FUMARATE DIHYDRATE 2 PUFF: 200; 5 AEROSOL RESPIRATORY (INHALATION) at 08:53

## 2018-10-15 RX ADMIN — ALPRAZOLAM 0.5 MG: 0.5 TABLET ORAL at 12:50

## 2018-10-15 RX ADMIN — METHYLPREDNISOLONE SODIUM SUCCINATE 60 MG: 125 INJECTION, POWDER, FOR SOLUTION INTRAMUSCULAR; INTRAVENOUS at 05:59

## 2018-10-15 RX ADMIN — HYDROCODONE BITARTRATE AND ACETAMINOPHEN 1 TABLET: 5; 325 TABLET ORAL at 04:52

## 2018-10-15 RX ADMIN — POTASSIUM CHLORIDE 20 MEQ: 20 TABLET, EXTENDED RELEASE ORAL at 08:19

## 2018-10-15 RX ADMIN — APIXABAN 5 MG: 5 TABLET, FILM COATED ORAL at 08:19

## 2018-10-15 RX ADMIN — CITALOPRAM HYDROBROMIDE 40 MG: 20 TABLET ORAL at 08:18

## 2018-10-15 RX ADMIN — MONTELUKAST SODIUM 10 MG: 10 TABLET, FILM COATED ORAL at 08:19

## 2018-10-15 RX ADMIN — IPRATROPIUM BROMIDE AND ALBUTEROL SULFATE 1 AMPULE: .5; 3 SOLUTION RESPIRATORY (INHALATION) at 08:53

## 2018-10-15 RX ADMIN — PSEUDOEPHEDRINE HYDROCHLORIDE 120 MG: 120 TABLET, FILM COATED, EXTENDED RELEASE ORAL at 05:59

## 2018-10-15 RX ADMIN — Medication 10 ML: at 10:47

## 2018-10-15 RX ADMIN — DOXYCYCLINE HYCLATE 100 MG: 100 CAPSULE ORAL at 05:59

## 2018-10-15 ASSESSMENT — PAIN SCALES - GENERAL
PAINLEVEL_OUTOF10: 0
PAINLEVEL_OUTOF10: 8

## 2018-10-15 NOTE — DISCHARGE SUMMARY
Worker  Coordination of care with Consultants (if applicable)   Coordination of care with Receiving Facility Physician (if applicable)  Completion of DME forms (if applicable)  Preparation of Discharge Summary  Preparation of Medication Reconciliation  Preparation of Discharge Prescriptions    Signed:  rBiana Cannon PA-C  10/15/2018, 10:19 AM

## 2018-10-18 ENCOUNTER — HOSPITAL ENCOUNTER (OUTPATIENT)
Age: 53
Setting detail: SPECIMEN
Discharge: HOME | End: 2018-10-18
Payer: MEDICARE

## 2018-10-18 LAB
ANION GAP SERPL CALCULATED.3IONS-SCNC: 7 MMOL/L (ref 9–17)
BUN BLDV-MCNC: 17 MG/DL (ref 6–20)
BUN/CREAT BLD: 37 (ref 9–20)
CALCIUM SERPL-MCNC: 8.8 MG/DL (ref 8.6–10.4)
CHLORIDE BLD-SCNC: 95 MMOL/L (ref 98–107)
CO2: 37 MMOL/L (ref 20–31)
CREAT SERPL-MCNC: 0.46 MG/DL (ref 0.7–1.2)
GFR AFRICAN AMERICAN: >60 ML/MIN
GFR NON-AFRICAN AMERICAN: >60 ML/MIN
GFR SERPL CREATININE-BSD FRML MDRD: ABNORMAL ML/MIN/{1.73_M2}
GFR SERPL CREATININE-BSD FRML MDRD: ABNORMAL ML/MIN/{1.73_M2}
GLUCOSE BLD-MCNC: 85 MG/DL (ref 70–99)
POTASSIUM SERPL-SCNC: 3.9 MMOL/L (ref 3.7–5.3)
SODIUM BLD-SCNC: 139 MMOL/L (ref 135–144)

## 2018-10-18 PROCEDURE — P9604 ONE-WAY ALLOW PRORATED TRIP: HCPCS

## 2018-10-18 PROCEDURE — 36415 COLL VENOUS BLD VENIPUNCTURE: CPT

## 2018-10-18 PROCEDURE — 80048 BASIC METABOLIC PNL TOTAL CA: CPT

## 2018-10-25 ENCOUNTER — OFFICE VISIT (OUTPATIENT)
Dept: PULMONOLOGY | Age: 53
End: 2018-10-25
Payer: MEDICARE

## 2018-10-25 VITALS
TEMPERATURE: 98.8 F | SYSTOLIC BLOOD PRESSURE: 105 MMHG | DIASTOLIC BLOOD PRESSURE: 72 MMHG | RESPIRATION RATE: 24 BRPM | OXYGEN SATURATION: 97 % | WEIGHT: 98 LBS | BODY MASS INDEX: 16.73 KG/M2 | HEIGHT: 64 IN | HEART RATE: 107 BPM

## 2018-10-25 DIAGNOSIS — J96.12 CHRONIC RESPIRATORY FAILURE WITH HYPOXIA AND HYPERCAPNIA (HCC): ICD-10-CM

## 2018-10-25 DIAGNOSIS — J96.11 CHRONIC RESPIRATORY FAILURE WITH HYPOXIA AND HYPERCAPNIA (HCC): ICD-10-CM

## 2018-10-25 DIAGNOSIS — J44.9 COPD, VERY SEVERE (HCC): ICD-10-CM

## 2018-10-25 DIAGNOSIS — J43.1 PANLOBULAR EMPHYSEMA (HCC): Primary | ICD-10-CM

## 2018-10-25 PROCEDURE — G8419 CALC BMI OUT NRM PARAM NOF/U: HCPCS | Performed by: INTERNAL MEDICINE

## 2018-10-25 PROCEDURE — G8428 CUR MEDS NOT DOCUMENT: HCPCS | Performed by: INTERNAL MEDICINE

## 2018-10-25 PROCEDURE — G8926 SPIRO NO PERF OR DOC: HCPCS | Performed by: INTERNAL MEDICINE

## 2018-10-25 PROCEDURE — 3023F SPIROM DOC REV: CPT | Performed by: INTERNAL MEDICINE

## 2018-10-25 PROCEDURE — G8482 FLU IMMUNIZE ORDER/ADMIN: HCPCS | Performed by: INTERNAL MEDICINE

## 2018-10-25 PROCEDURE — 3017F COLORECTAL CA SCREEN DOC REV: CPT | Performed by: INTERNAL MEDICINE

## 2018-10-25 PROCEDURE — 4004F PT TOBACCO SCREEN RCVD TLK: CPT | Performed by: INTERNAL MEDICINE

## 2018-10-25 PROCEDURE — 99215 OFFICE O/P EST HI 40 MIN: CPT | Performed by: INTERNAL MEDICINE

## 2018-10-25 PROCEDURE — 1111F DSCHRG MED/CURRENT MED MERGE: CPT | Performed by: INTERNAL MEDICINE

## 2018-10-25 NOTE — PROGRESS NOTES
hospitalized he did not smoke cigarettes  He is using oxygen 2 L all the time and on BiPAP at night with O2  He complained of chronic postnasal dripping nasal congestion no nasal obstruction and that that is present for a long time. Take Flonase as needed    Previous history  Dyspnea worsening since 2012 initially on uphill task and exertion and ow SOB on any activities and any exertion  Chronic cough and productive of whitish sputum  He had CXR when he went to ER and told he has COPD  Use to work in [employer] at night in [employer] and not working now  35-pack-year of smoking    Past Medical History:        Diagnosis Date    Arthritis     COPD [facility]chronic obstructive pulmonary disease) [address] [address]     Hypertension     Oxygen dependent     Scoliosis        Past Surgical History:        Procedure Laterality Date    FACIAL RECONSTRUCTION SURGERY Left 4/13/2017    FACIAL LESION BIOPSY EXCISION LEFT CHEEK performed by Brooklynn Blair DO at [facility] Left 04/13/2017    facial lesion biopsy    TONSILLECTOMY         Allergies:    No Known Allergies      Home Meds:   Outpatient Encounter Prescriptions as of 10/25/2018   Medication Sig Dispense Refill    ALPRAZolam (XANAX) 0.5 MG tablet Take 1 tablet by mouth 2 times daily as needed for Anxiety for up to 10 days. . 10 tablet 0    predniSONE (DELTASONE) 10 MG tablet Take 3 tabs po bid X 3 days, then 2 tabs po bid X 3 days, then 1 tab po bid X 3 days, then 1 tab po daily until script complete 40 tablet 0    furosemide (LASIX) 40 MG tablet Take 1 tablet by mouth daily for 7 days 7 tablet 0    citalopram (CELEXA) 40 MG tablet Take 1 tablet by mouth daily 90 tablet 3    CPAP Machine MISC by Does not apply route USE AS DIRECTED 1 each 0    Nutritional Supplements (ENSURE COMPLETE) LIQD Take 2 Bottles by mouth daily 60 Bottle 5    tamsulosin (FLOMAX) 0.4 MG capsule       loperamide (IMODIUM) 2 MG capsule Take 2 mg by mouth 4 times daily as needed for

## 2018-10-26 PROBLEM — I10 ESSENTIAL HYPERTENSION, BENIGN: Status: ACTIVE | Noted: 2018-10-26

## 2018-10-30 RX ORDER — FUROSEMIDE 40 MG/1
40 TABLET ORAL 2 TIMES DAILY
Status: ON HOLD | COMMUNITY
End: 2018-11-16 | Stop reason: HOSPADM

## 2018-10-30 RX ORDER — CEFUROXIME AXETIL 500 MG/1
500 TABLET ORAL 2 TIMES DAILY
Status: ON HOLD | COMMUNITY
End: 2018-11-16

## 2018-11-09 ENCOUNTER — APPOINTMENT (OUTPATIENT)
Dept: CT IMAGING | Age: 53
DRG: 189 | End: 2018-11-09
Payer: MEDICARE

## 2018-11-09 ENCOUNTER — HOSPITAL ENCOUNTER (INPATIENT)
Age: 53
LOS: 7 days | Discharge: NURSING, REHAB OR LONG-TERM CARE | DRG: 189 | End: 2018-11-16
Attending: EMERGENCY MEDICINE | Admitting: INTERNAL MEDICINE
Payer: MEDICARE

## 2018-11-09 ENCOUNTER — TELEPHONE (OUTPATIENT)
Dept: OTHER | Facility: CLINIC | Age: 53
End: 2018-11-09

## 2018-11-09 ENCOUNTER — APPOINTMENT (OUTPATIENT)
Dept: GENERAL RADIOLOGY | Age: 53
DRG: 189 | End: 2018-11-09
Payer: MEDICARE

## 2018-11-09 DIAGNOSIS — Z72.0 TOBACCO ABUSE: ICD-10-CM

## 2018-11-09 DIAGNOSIS — J44.1 COPD EXACERBATION (HCC): ICD-10-CM

## 2018-11-09 DIAGNOSIS — J96.21 ACUTE ON CHRONIC RESPIRATORY FAILURE WITH HYPOXIA AND HYPERCAPNIA (HCC): Primary | ICD-10-CM

## 2018-11-09 DIAGNOSIS — F41.9 SEVERE ANXIETY: Chronic | ICD-10-CM

## 2018-11-09 DIAGNOSIS — G89.29 OTHER CHRONIC PAIN: ICD-10-CM

## 2018-11-09 DIAGNOSIS — J96.22 ACUTE ON CHRONIC RESPIRATORY FAILURE WITH HYPOXIA AND HYPERCAPNIA (HCC): Primary | ICD-10-CM

## 2018-11-09 LAB
-: ABNORMAL
ABSOLUTE EOS #: 0 K/UL (ref 0–0.44)
ABSOLUTE IMMATURE GRANULOCYTE: 0 K/UL (ref 0–0.3)
ABSOLUTE LYMPH #: 0.8 K/UL (ref 1.1–3.7)
ABSOLUTE MONO #: 0.17 K/UL (ref 0.1–1.2)
ALBUMIN SERPL-MCNC: 4 G/DL (ref 3.5–5.2)
ALBUMIN/GLOBULIN RATIO: 1.6 (ref 1–2.5)
ALLEN TEST: ABNORMAL
ALP BLD-CCNC: 76 U/L (ref 40–129)
ALT SERPL-CCNC: 25 U/L (ref 5–41)
AMORPHOUS: ABNORMAL
ANION GAP SERPL CALCULATED.3IONS-SCNC: 9 MMOL/L (ref 9–17)
AST SERPL-CCNC: 19 U/L
BACTERIA: ABNORMAL
BASOPHILS # BLD: 1 % (ref 0–2)
BASOPHILS ABSOLUTE: 0.06 K/UL (ref 0–0.2)
BILIRUB SERPL-MCNC: 0.38 MG/DL (ref 0.3–1.2)
BILIRUBIN URINE: NEGATIVE
BNP INTERPRETATION: ABNORMAL
BUN BLDV-MCNC: 23 MG/DL (ref 6–20)
BUN/CREAT BLD: 43 (ref 9–20)
C-REACTIVE PROTEIN: 13.7 MG/L (ref 0–5)
CALCIUM SERPL-MCNC: 9.5 MG/DL (ref 8.6–10.4)
CARBOXYHEMOGLOBIN: ABNORMAL % (ref 0–5)
CASTS UA: ABNORMAL /LPF
CHLORIDE BLD-SCNC: 93 MMOL/L (ref 98–107)
CO2: 44 MMOL/L (ref 20–31)
COLOR: YELLOW
COMMENT UA: ABNORMAL
CREAT SERPL-MCNC: 0.54 MG/DL (ref 0.7–1.2)
CRYSTALS, UA: ABNORMAL /HPF
DIFFERENTIAL TYPE: ABNORMAL
DIRECT EXAM: NORMAL
EKG ATRIAL RATE: 117 BPM
EKG P AXIS: 67 DEGREES
EKG P-R INTERVAL: 154 MS
EKG Q-T INTERVAL: 304 MS
EKG QRS DURATION: 78 MS
EKG QTC CALCULATION (BAZETT): 424 MS
EKG R AXIS: -19 DEGREES
EKG T AXIS: 80 DEGREES
EKG VENTRICULAR RATE: 117 BPM
EOSINOPHILS RELATIVE PERCENT: 0 % (ref 1–4)
EPITHELIAL CELLS UA: ABNORMAL /HPF (ref 0–5)
FIO2: ABNORMAL
GFR AFRICAN AMERICAN: >60 ML/MIN
GFR NON-AFRICAN AMERICAN: >60 ML/MIN
GFR SERPL CREATININE-BSD FRML MDRD: ABNORMAL ML/MIN/{1.73_M2}
GFR SERPL CREATININE-BSD FRML MDRD: ABNORMAL ML/MIN/{1.73_M2}
GLUCOSE BLD-MCNC: 105 MG/DL (ref 70–99)
GLUCOSE URINE: NEGATIVE
HCO3 ARTERIAL: 47.4 MMOL/L (ref 22–26)
HCT VFR BLD CALC: 43.2 % (ref 40.7–50.3)
HEMOGLOBIN: 13.6 G/DL (ref 13–17)
IMMATURE GRANULOCYTES: 0 %
INR BLD: 1.1 (ref 0.9–1.2)
KETONES, URINE: ABNORMAL
LACTIC ACID, SEPSIS WHOLE BLOOD: NORMAL MMOL/L (ref 0.5–1.9)
LACTIC ACID, SEPSIS WHOLE BLOOD: NORMAL MMOL/L (ref 0.5–1.9)
LACTIC ACID, SEPSIS: 1.3 MMOL/L (ref 0.5–1.9)
LACTIC ACID, SEPSIS: 1.4 MMOL/L (ref 0.5–1.9)
LEUKOCYTE ESTERASE, URINE: NEGATIVE
LYMPHOCYTES # BLD: 14 % (ref 24–43)
Lab: NORMAL
MCH RBC QN AUTO: 33.4 PG (ref 25.2–33.5)
MCHC RBC AUTO-ENTMCNC: 31.5 G/DL (ref 28.4–34.8)
MCV RBC AUTO: 106.1 FL (ref 82.6–102.9)
METHEMOGLOBIN: ABNORMAL % (ref 0–1.9)
MODE: ABNORMAL
MONOCYTES # BLD: 3 % (ref 3–12)
MORPHOLOGY: NORMAL
MUCUS: ABNORMAL
NEGATIVE BASE EXCESS, ART: ABNORMAL MMOL/L (ref 0–2)
NITRITE, URINE: NEGATIVE
NOTIFICATION TIME: ABNORMAL
NOTIFICATION: ABNORMAL
NRBC AUTOMATED: 0 PER 100 WBC
O2 DEVICE/FLOW/%: ABNORMAL
O2 SAT, ARTERIAL: 99.4 % (ref 95–98)
OTHER OBSERVATIONS UA: ABNORMAL
OXYHEMOGLOBIN: ABNORMAL % (ref 95–98)
PARTIAL THROMBOPLASTIN TIME: 28 SEC (ref 23.2–34.4)
PATIENT TEMP: 37
PCO2 ARTERIAL: 93.9 MMHG (ref 35–45)
PCO2, ART, TEMP ADJ: ABNORMAL (ref 35–45)
PDW BLD-RTO: 13.1 % (ref 11.8–14.4)
PEEP/CPAP: ABNORMAL
PH ARTERIAL: 7.32 (ref 7.35–7.45)
PH UA: 6 (ref 5–9)
PH, ART, TEMP ADJ: ABNORMAL (ref 7.35–7.45)
PLATELET # BLD: 259 K/UL (ref 138–453)
PLATELET ESTIMATE: ABNORMAL
PMV BLD AUTO: 8.8 FL (ref 8.1–13.5)
PO2 ARTERIAL: 235.2 MMHG (ref 80–100)
PO2, ART, TEMP ADJ: ABNORMAL MMHG (ref 80–100)
POSITIVE BASE EXCESS, ART: 16 MMOL/L (ref 0–2)
POTASSIUM SERPL-SCNC: 4.7 MMOL/L (ref 3.7–5.3)
PRO-BNP: 1240 PG/ML
PROCALCITONIN: 0.08 NG/ML
PROTEIN UA: NEGATIVE
PROTHROMBIN TIME: 11.3 SEC (ref 9.7–12.2)
PSV: ABNORMAL
PT. POSITION: ABNORMAL
RBC # BLD: 4.07 M/UL (ref 4.21–5.77)
RBC # BLD: ABNORMAL 10*6/UL
RBC UA: ABNORMAL /HPF (ref 0–2)
RENAL EPITHELIAL, UA: ABNORMAL /HPF
RESPIRATORY RATE: 14
SAMPLE SITE: ABNORMAL
SEDIMENTATION RATE, ERYTHROCYTE: 44 MM (ref 0–20)
SEG NEUTROPHILS: 82 % (ref 36–65)
SEGMENTED NEUTROPHILS ABSOLUTE COUNT: 4.67 K/UL (ref 1.5–8.1)
SET RATE: ABNORMAL
SODIUM BLD-SCNC: 146 MMOL/L (ref 135–144)
SPECIFIC GRAVITY UA: 1.02 (ref 1.01–1.02)
SPECIMEN DESCRIPTION: NORMAL
STATUS: NORMAL
TEXT FOR RESPIRATORY: ABNORMAL
TOTAL HB: ABNORMAL G/DL (ref 12–16)
TOTAL PROTEIN: 6.5 G/DL (ref 6.4–8.3)
TOTAL RATE: ABNORMAL
TRICHOMONAS: ABNORMAL
TROPONIN INTERP: NORMAL
TROPONIN T: <0.03 NG/ML
TURBIDITY: CLEAR
URINE HGB: NEGATIVE
UROBILINOGEN, URINE: NORMAL
VT: ABNORMAL
WBC # BLD: 5.7 K/UL (ref 3.5–11.3)
WBC # BLD: ABNORMAL 10*3/UL
WBC UA: ABNORMAL /HPF (ref 0–5)
YEAST: ABNORMAL

## 2018-11-09 PROCEDURE — 6360000002 HC RX W HCPCS: Performed by: INTERNAL MEDICINE

## 2018-11-09 PROCEDURE — 6370000000 HC RX 637 (ALT 250 FOR IP): Performed by: INTERNAL MEDICINE

## 2018-11-09 PROCEDURE — 6360000002 HC RX W HCPCS: Performed by: EMERGENCY MEDICINE

## 2018-11-09 PROCEDURE — 96365 THER/PROPH/DIAG IV INF INIT: CPT

## 2018-11-09 PROCEDURE — 2580000003 HC RX 258: Performed by: EMERGENCY MEDICINE

## 2018-11-09 PROCEDURE — 81001 URINALYSIS AUTO W/SCOPE: CPT

## 2018-11-09 PROCEDURE — 6370000000 HC RX 637 (ALT 250 FOR IP): Performed by: EMERGENCY MEDICINE

## 2018-11-09 PROCEDURE — 83605 ASSAY OF LACTIC ACID: CPT

## 2018-11-09 PROCEDURE — 87899 AGENT NOS ASSAY W/OPTIC: CPT

## 2018-11-09 PROCEDURE — 71045 X-RAY EXAM CHEST 1 VIEW: CPT

## 2018-11-09 PROCEDURE — 85025 COMPLETE CBC W/AUTO DIFF WBC: CPT

## 2018-11-09 PROCEDURE — 82805 BLOOD GASES W/O2 SATURATION: CPT

## 2018-11-09 PROCEDURE — 80053 COMPREHEN METABOLIC PANEL: CPT

## 2018-11-09 PROCEDURE — 94664 DEMO&/EVAL PT USE INHALER: CPT

## 2018-11-09 PROCEDURE — 87086 URINE CULTURE/COLONY COUNT: CPT

## 2018-11-09 PROCEDURE — 84145 PROCALCITONIN (PCT): CPT

## 2018-11-09 PROCEDURE — 2000000000 HC ICU R&B

## 2018-11-09 PROCEDURE — 71250 CT THORAX DX C-: CPT

## 2018-11-09 PROCEDURE — 94640 AIRWAY INHALATION TREATMENT: CPT

## 2018-11-09 PROCEDURE — 96375 TX/PRO/DX INJ NEW DRUG ADDON: CPT

## 2018-11-09 PROCEDURE — 86140 C-REACTIVE PROTEIN: CPT

## 2018-11-09 PROCEDURE — 87040 BLOOD CULTURE FOR BACTERIA: CPT

## 2018-11-09 PROCEDURE — 2580000003 HC RX 258: Performed by: INTERNAL MEDICINE

## 2018-11-09 PROCEDURE — 87449 NOS EACH ORGANISM AG IA: CPT

## 2018-11-09 PROCEDURE — 85651 RBC SED RATE NONAUTOMATED: CPT

## 2018-11-09 PROCEDURE — 83880 ASSAY OF NATRIURETIC PEPTIDE: CPT

## 2018-11-09 PROCEDURE — 94660 CPAP INITIATION&MGMT: CPT

## 2018-11-09 PROCEDURE — 85730 THROMBOPLASTIN TIME PARTIAL: CPT

## 2018-11-09 PROCEDURE — 84484 ASSAY OF TROPONIN QUANT: CPT

## 2018-11-09 PROCEDURE — 87804 INFLUENZA ASSAY W/OPTIC: CPT

## 2018-11-09 PROCEDURE — 93005 ELECTROCARDIOGRAM TRACING: CPT

## 2018-11-09 PROCEDURE — 99285 EMERGENCY DEPT VISIT HI MDM: CPT

## 2018-11-09 PROCEDURE — 85610 PROTHROMBIN TIME: CPT

## 2018-11-09 PROCEDURE — 36415 COLL VENOUS BLD VENIPUNCTURE: CPT

## 2018-11-09 RX ORDER — HYDROCODONE BITARTRATE AND ACETAMINOPHEN 5; 325 MG/1; MG/1
1 TABLET ORAL EVERY 6 HOURS PRN
Status: ON HOLD | COMMUNITY
End: 2018-11-16

## 2018-11-09 RX ORDER — NICOTINE 21 MG/24HR
1 PATCH, TRANSDERMAL 24 HOURS TRANSDERMAL EVERY 24 HOURS
Status: DISCONTINUED | OUTPATIENT
Start: 2018-11-09 | End: 2018-11-16 | Stop reason: HOSPADM

## 2018-11-09 RX ORDER — TAMSULOSIN HYDROCHLORIDE 0.4 MG/1
0.4 CAPSULE ORAL DAILY
Status: DISCONTINUED | OUTPATIENT
Start: 2018-11-09 | End: 2018-11-16 | Stop reason: HOSPADM

## 2018-11-09 RX ORDER — SODIUM CHLORIDE 0.9 % (FLUSH) 0.9 %
10 SYRINGE (ML) INJECTION PRN
Status: DISCONTINUED | OUTPATIENT
Start: 2018-11-09 | End: 2018-11-16 | Stop reason: HOSPADM

## 2018-11-09 RX ORDER — ACETAMINOPHEN 325 MG/1
650 TABLET ORAL ONCE
Status: COMPLETED | OUTPATIENT
Start: 2018-11-09 | End: 2018-11-09

## 2018-11-09 RX ORDER — HYDROCODONE BITARTRATE AND ACETAMINOPHEN 5; 325 MG/1; MG/1
1 TABLET ORAL EVERY 6 HOURS PRN
Status: DISCONTINUED | OUTPATIENT
Start: 2018-11-09 | End: 2018-11-16 | Stop reason: HOSPADM

## 2018-11-09 RX ORDER — IPRATROPIUM BROMIDE AND ALBUTEROL SULFATE 2.5; .5 MG/3ML; MG/3ML
SOLUTION RESPIRATORY (INHALATION)
Status: DISPENSED
Start: 2018-11-09 | End: 2018-11-10

## 2018-11-09 RX ORDER — PSEUDOEPHEDRINE HCL 120 MG/1
120 TABLET, FILM COATED, EXTENDED RELEASE ORAL 2 TIMES DAILY
Status: DISCONTINUED | OUTPATIENT
Start: 2018-11-09 | End: 2018-11-16 | Stop reason: HOSPADM

## 2018-11-09 RX ORDER — 0.9 % SODIUM CHLORIDE 0.9 %
250 INTRAVENOUS SOLUTION INTRAVENOUS ONCE
Status: COMPLETED | OUTPATIENT
Start: 2018-11-09 | End: 2018-11-09

## 2018-11-09 RX ORDER — DILTIAZEM HYDROCHLORIDE 180 MG/1
180 CAPSULE, COATED, EXTENDED RELEASE ORAL DAILY
Status: DISCONTINUED | OUTPATIENT
Start: 2018-11-09 | End: 2018-11-13

## 2018-11-09 RX ORDER — ALBUTEROL SULFATE 2.5 MG/3ML
2.5 SOLUTION RESPIRATORY (INHALATION) ONCE
Status: COMPLETED | OUTPATIENT
Start: 2018-11-09 | End: 2018-11-09

## 2018-11-09 RX ORDER — FUROSEMIDE 40 MG/1
40 TABLET ORAL DAILY
Status: DISCONTINUED | OUTPATIENT
Start: 2018-11-09 | End: 2018-11-16 | Stop reason: HOSPADM

## 2018-11-09 RX ORDER — LOPERAMIDE HYDROCHLORIDE 2 MG/1
2 CAPSULE ORAL 4 TIMES DAILY PRN
Status: DISCONTINUED | OUTPATIENT
Start: 2018-11-09 | End: 2018-11-16 | Stop reason: HOSPADM

## 2018-11-09 RX ORDER — FAMOTIDINE 20 MG/1
20 TABLET, FILM COATED ORAL 2 TIMES DAILY
Status: DISCONTINUED | OUTPATIENT
Start: 2018-11-09 | End: 2018-11-16 | Stop reason: HOSPADM

## 2018-11-09 RX ORDER — METHYLPREDNISOLONE SODIUM SUCCINATE 125 MG/2ML
60 INJECTION, POWDER, LYOPHILIZED, FOR SOLUTION INTRAMUSCULAR; INTRAVENOUS EVERY 12 HOURS
Status: DISCONTINUED | OUTPATIENT
Start: 2018-11-10 | End: 2018-11-14

## 2018-11-09 RX ORDER — ONDANSETRON 2 MG/ML
4 INJECTION INTRAMUSCULAR; INTRAVENOUS EVERY 6 HOURS PRN
Status: DISCONTINUED | OUTPATIENT
Start: 2018-11-09 | End: 2018-11-14

## 2018-11-09 RX ORDER — IPRATROPIUM BROMIDE AND ALBUTEROL SULFATE 2.5; .5 MG/3ML; MG/3ML
1 SOLUTION RESPIRATORY (INHALATION)
Status: DISCONTINUED | OUTPATIENT
Start: 2018-11-09 | End: 2018-11-10

## 2018-11-09 RX ORDER — SODIUM CHLORIDE 0.9 % (FLUSH) 0.9 %
10 SYRINGE (ML) INJECTION EVERY 12 HOURS SCHEDULED
Status: DISCONTINUED | OUTPATIENT
Start: 2018-11-09 | End: 2018-11-16 | Stop reason: HOSPADM

## 2018-11-09 RX ORDER — MONTELUKAST SODIUM 10 MG/1
10 TABLET ORAL DAILY
Status: DISCONTINUED | OUTPATIENT
Start: 2018-11-09 | End: 2018-11-16 | Stop reason: HOSPADM

## 2018-11-09 RX ORDER — MAGNESIUM HYDROXIDE/ALUMINUM HYDROXICE/SIMETHICONE 120; 1200; 1200 MG/30ML; MG/30ML; MG/30ML
15 SUSPENSION ORAL EVERY 6 HOURS PRN
Status: DISCONTINUED | OUTPATIENT
Start: 2018-11-09 | End: 2018-11-16 | Stop reason: HOSPADM

## 2018-11-09 RX ORDER — METHYLPREDNISOLONE SODIUM SUCCINATE 125 MG/2ML
125 INJECTION, POWDER, LYOPHILIZED, FOR SOLUTION INTRAMUSCULAR; INTRAVENOUS ONCE
Status: COMPLETED | OUTPATIENT
Start: 2018-11-09 | End: 2018-11-09

## 2018-11-09 RX ORDER — POTASSIUM CHLORIDE 20 MEQ/1
20 TABLET, EXTENDED RELEASE ORAL DAILY
Status: DISCONTINUED | OUTPATIENT
Start: 2018-11-09 | End: 2018-11-16 | Stop reason: HOSPADM

## 2018-11-09 RX ORDER — ALBUTEROL SULFATE 2.5 MG/3ML
2.5 SOLUTION RESPIRATORY (INHALATION) EVERY 6 HOURS PRN
Status: DISCONTINUED | OUTPATIENT
Start: 2018-11-09 | End: 2018-11-16 | Stop reason: HOSPADM

## 2018-11-09 RX ORDER — SODIUM CHLORIDE 9 MG/ML
INJECTION, SOLUTION INTRAVENOUS CONTINUOUS
Status: DISCONTINUED | OUTPATIENT
Start: 2018-11-09 | End: 2018-11-16

## 2018-11-09 RX ORDER — CITALOPRAM 20 MG/1
40 TABLET ORAL DAILY
Status: DISCONTINUED | OUTPATIENT
Start: 2018-11-09 | End: 2018-11-16 | Stop reason: HOSPADM

## 2018-11-09 RX ADMIN — ACETAMINOPHEN 650 MG: 325 TABLET ORAL at 14:28

## 2018-11-09 RX ADMIN — ALBUTEROL SULFATE 2.5 MG: 2.5 SOLUTION RESPIRATORY (INHALATION) at 13:58

## 2018-11-09 RX ADMIN — AZITHROMYCIN MONOHYDRATE 500 MG: 500 INJECTION, POWDER, LYOPHILIZED, FOR SOLUTION INTRAVENOUS at 19:36

## 2018-11-09 RX ADMIN — SODIUM CHLORIDE: 9 INJECTION, SOLUTION INTRAVENOUS at 19:27

## 2018-11-09 RX ADMIN — PSEUDOEPHEDRINE HYDROCHLORIDE 120 MG: 120 TABLET, FILM COATED, EXTENDED RELEASE ORAL at 20:20

## 2018-11-09 RX ADMIN — PIPERACILLIN SODIUM,TAZOBACTAM SODIUM 3.38 G: 3; .375 INJECTION, POWDER, FOR SOLUTION INTRAVENOUS at 14:48

## 2018-11-09 RX ADMIN — METHYLPREDNISOLONE SODIUM SUCCINATE 125 MG: 125 INJECTION, POWDER, FOR SOLUTION INTRAMUSCULAR; INTRAVENOUS at 14:31

## 2018-11-09 RX ADMIN — VANCOMYCIN HYDROCHLORIDE 750 MG: 750 INJECTION, POWDER, LYOPHILIZED, FOR SOLUTION INTRAVENOUS at 15:10

## 2018-11-09 RX ADMIN — CEFTRIAXONE 1 G: 1 INJECTION, POWDER, FOR SOLUTION INTRAMUSCULAR; INTRAVENOUS at 19:36

## 2018-11-09 RX ADMIN — APIXABAN 5 MG: 5 TABLET, FILM COATED ORAL at 20:20

## 2018-11-09 RX ADMIN — FAMOTIDINE 20 MG: 20 TABLET ORAL at 20:20

## 2018-11-09 RX ADMIN — SODIUM CHLORIDE 250 ML: 9 INJECTION, SOLUTION INTRAVENOUS at 16:54

## 2018-11-09 RX ADMIN — IPRATROPIUM BROMIDE 0.5 MG: 0.5 SOLUTION RESPIRATORY (INHALATION) at 13:58

## 2018-11-09 RX ADMIN — Medication 2 PUFF: at 19:48

## 2018-11-09 RX ADMIN — IPRATROPIUM BROMIDE AND ALBUTEROL SULFATE 1 AMPULE: .5; 3 SOLUTION RESPIRATORY (INHALATION) at 19:48

## 2018-11-09 ASSESSMENT — PAIN DESCRIPTION - PAIN TYPE: TYPE: ACUTE PAIN

## 2018-11-09 ASSESSMENT — PAIN SCALES - GENERAL: PAINLEVEL_OUTOF10: 8

## 2018-11-09 ASSESSMENT — PAIN DESCRIPTION - LOCATION: LOCATION: ABDOMEN

## 2018-11-09 NOTE — TELEPHONE ENCOUNTER
Writer contacted KEITH Addison, ED provider to inform of 30 day readmission risk. ED provider informed writer of probable readmission.

## 2018-11-09 NOTE — ED NOTES
Dr. Anderson Cervantes returned call. Currently speaking with Dr. Henry Mitchell.      Chintan MCKEON Reser  11/09/18 1262

## 2018-11-09 NOTE — PROGRESS NOTES
Pt arrived to unit via cart on telemetry. Admitted for COPD exacerbation and acute on chronic respiratory failure. No family present at this time. Will transfer pt to bed and complete admission navigator.

## 2018-11-09 NOTE — ED PROVIDER NOTES
Please consider 90 day supplies to promote better adherence  Associated Diagnoses: Chronic obstructive pulmonary disease, unspecified COPD type (HCC)      ipratropium (ATROVENT) 0.02 % nebulizer solution Take 2.5 mLs by nebulization 4 times daily  Qty: 300 mL, Refills: 11      albuterol (PROVENTIL) (2.5 MG/3ML) 0.083% nebulizer solution Take 3 mLs by nebulization every 6 hours as needed for Wheezing  Qty: 120 each, Refills: 11      cefUROXime (CEFTIN) 500 MG tablet Take 500 mg by mouth 2 times daily      predniSONE (DELTASONE) 10 MG tablet Take 3 tabs po bid X 3 days, then 2 tabs po bid X 3 days, then 1 tab po bid X 3 days, then 1 tab po daily until script complete  Qty: 40 tablet, Refills: 0      CPAP Machine MISC by Does not apply route USE AS DIRECTED  Qty: 1 each, Refills: 0    Associated Diagnoses: COPD, severe (HCC)      Nutritional Supplements (ENSURE COMPLETE) LIQD Take 2 Bottles by mouth daily  Qty: 60 Bottle, Refills: 5    Associated Diagnoses: Severe malnutrition (HCC)      loperamide (IMODIUM) 2 MG capsule Take 2 mg by mouth 4 times daily as needed for Diarrhea      magnesium hydroxide (MILK OF MAGNESIA) 400 MG/5ML suspension Take by mouth daily as needed for Constipation      aluminum & magnesium hydroxide-simethicone (MAALOX) 200-200-20 MG/5ML SUSP suspension Take 15 mLs by mouth every 6 hours as needed for Indigestion             ALLERGIES     Patient has no known allergies.     FAMILY HISTORY       Family History   Problem Relation Age of Onset    Stroke Mother     Emphysema Father     Cancer Father     Diabetes Paternal Grandmother     Stroke Paternal Grandmother         SOCIAL HISTORY       Social History     Social History    Marital status: Single     Spouse name: N/A    Number of children: N/A    Years of education: N/A     Social History Main Topics    Smoking status: Current Some Day Smoker     Packs/day: 0.10     Types: Cigarettes    Smokeless tobacco: Never Used      Comment: the right lung apex and medial basal right lower lobe respectively. Neoplasm not excluded. PET-CT or tissue sampling is recommended. 2. Moderate to severe centrilobular emphysema unchanged. Xr Chest Portable    Result Date: 11/9/2018  EXAMINATION: SINGLE XRAY VIEW OF THE CHEST 11/9/2018 2:49 pm COMPARISON: October 8, 2018, June 9, 2018 HISTORY: ORDERING SYSTEM PROVIDED HISTORY: dyspnea TECHNOLOGIST PROVIDED HISTORY: dyspnea FINDINGS: The cardiomediastinal silhouette is unchanged in appearance. Hyperinflation and findings of emphysema are again demonstrated. There is no consolidation, pneumothorax, or evidence of edema. No effusion is appreciated. Subacute lateral left 8th rib fracture. The osseous structures are unchanged in appearance. Emphysema. No acute airspace disease identified.        ED BEDSIDE ULTRASOUND:   Performed by ED Physician - none    LABS:  Labs Reviewed   CBC WITH AUTO DIFFERENTIAL - Abnormal; Notable for the following:        Result Value    RBC 4.07 (*)     .1 (*)     Seg Neutrophils 82 (*)     Lymphocytes 14 (*)     Eosinophils % 0 (*)     Absolute Lymph # 0.80 (*)     All other components within normal limits   SEDIMENTATION RATE - Abnormal; Notable for the following:     Sed Rate 44 (*)     All other components within normal limits   COMPREHENSIVE METABOLIC PANEL W/ REFLEX TO MG FOR LOW K - Abnormal; Notable for the following:     Glucose 105 (*)     BUN 23 (*)     CREATININE 0.54 (*)     Bun/Cre Ratio 43 (*)     Sodium 146 (*)     Chloride 93 (*)     CO2 44 (*)     All other components within normal limits   BRAIN NATRIURETIC PEPTIDE - Abnormal; Notable for the following:     Pro-BNP 1,240 (*)     All other components within normal limits   URINALYSIS - Abnormal; Notable for the following:     Ketones, Urine 3+ (*)     Specific Gravity, UA 1.025 (*)     All other components within normal limits   C-REACTIVE PROTEIN - Abnormal; Notable for the following:     CRP 13.7 (*) All other components within normal limits   BLOOD GAS, ARTERIAL - Abnormal; Notable for the following:     pH, Arterial 7.321 (*)     pCO2, Arterial 93.9 (*)     pO2, Arterial 235.2 (*)     HCO3, Arterial 47.4 (*)     Positive Base Excess, Art 16.0 (*)     O2 Sat, Arterial 99.4 (*)     All other components within normal limits   MICROSCOPIC URINALYSIS - Abnormal; Notable for the following:     Bacteria, UA TRACE (*)     Mucus, UA 2+ (*)     All other components within normal limits   RAPID INFLUENZA A/B ANTIGENS   URINE CULTURE   CULTURE BLOOD #1   CULTURE BLOOD #2   STREP PNEUMONIAE ANTIGEN   LEGIONELLA ANTIGEN, URINE   GRAM STAIN   APTT   TROPONIN   PROTIME-INR   LACTATE, SEPSIS   LACTATE, SEPSIS   PROCALCITONIN   BLOOD GAS, ARTERIAL   CBC WITH AUTO DIFFERENTIAL   BASIC METABOLIC PANEL W/ REFLEX TO MG FOR LOW K          Results for orders placed or performed during the hospital encounter of 11/09/18   Rapid influenza A/B antigens   Result Value Ref Range    Specimen Description . NARES     Special Requests NOT REPORTED     Direct Exam       Presumptive negative for the presence of Influenza A and Influenza B antigen.     Direct Exam        PCR confirmation of negative results is recommended, since the antigen present    Direct Exam        in the specimen may be below the detection limit of the test.    Status FINAL 11/09/2018    CBC auto differential   Result Value Ref Range    WBC 5.7 3.5 - 11.3 k/uL    RBC 4.07 (L) 4.21 - 5.77 m/uL    Hemoglobin 13.6 13.0 - 17.0 g/dL    Hematocrit 43.2 40.7 - 50.3 %    .1 (H) 82.6 - 102.9 fL    MCH 33.4 25.2 - 33.5 pg    MCHC 31.5 28.4 - 34.8 g/dL    RDW 13.1 11.8 - 14.4 %    Platelets 512 971 - 406 k/uL    MPV 8.8 8.1 - 13.5 fL    NRBC Automated 0.0 0.0 per 100 WBC    Differential Type NOT REPORTED     WBC Morphology NOT REPORTED     RBC Morphology NOT REPORTED     Platelet Estimate NOT REPORTED     Seg Neutrophils 82 (H) 36 - 65 %    Lymphocytes 14 (L) 24 - 43 % support. Patient doing better on bilevel support. We'll order CT scan chest to evaluate for underlying pneumonia or bronchiectasis        I do not think the patient is septic from bacterial  infection and with his poor heart history would not be giving him a fluid bolus  REVAL:     Spoke to Dr. Horacio Villaseñor degrees except admission of this patient. Patient will need to be admitted for multiple diagnoses. CRITICAL CARE TIME   Total Critical Care time was 35 minutes, excluding separately reportable procedures. There was a high probability of clinically significant/life threatening deterioration in the patient's condition which required my urgent intervention. CONSULTS:  PHARMACY TO DOSE VANCOMYCIN  IP CONSULT TO CASE MANAGEMENT  IP CONSULT TO SOCIAL WORK    PROCEDURES:  Unless otherwise noted below, none     Procedures    ATTESTATIONS  Vital signs and nursing notes reviewed. If included as part of this work-up, I interpreted the ECG andreviewed all diagnostic imaging as well as provided preliminary interpretation of plain film imaging as noted. As warranted and when indicated,  I reviewed the PDMP as  applicable. FINAL IMPRESSION      1. Acute on chronic respiratory failure with hypoxia and hypercapnia (HCC)    2. COPD exacerbation ([facility] [address]    3.  Tobacco abuse          DISPOSITION/PLAN   DISPOSITION        PATIENT REFERREDTO:  Warren Phelps, APRN - [facility]  [address] [address]  [phone]            DISCHARGE MEDICATIONS:  Current Discharge Medication List                   Summation      Patient Course: See HPI and MDM       ED Medications administered this visit:    Medications   albuterol (PROVENTIL) nebulizer solution 2.5 mg (not administered)   ipratropium (ATROVENT) 0.02 % nebulizer solution 0.5 mg (not administered)   mometasone-formoterol (DULERA) 200-5 MCG/ACT inhaler 2 puff (not administered)   Umeclidinium Bromide AEPB 1 puff (not administered)   nicotine (NICODERM CQ) 21 MG/24HR 1 patch

## 2018-11-09 NOTE — ED NOTES
Paged Dr. Marley Cortes as hospitalist per Dr. Hurtado Level request.     Neena Medici A Reser  11/09/18 0437

## 2018-11-10 PROBLEM — I48.0 PAROXYSMAL ATRIAL FIBRILLATION (HCC): Chronic | Status: ACTIVE | Noted: 2018-08-11

## 2018-11-10 LAB
ABSOLUTE EOS #: 0 K/UL (ref 0–0.44)
ABSOLUTE IMMATURE GRANULOCYTE: 0.03 K/UL (ref 0–0.3)
ABSOLUTE LYMPH #: 0.35 K/UL (ref 1.1–3.7)
ABSOLUTE MONO #: 0.06 K/UL (ref 0.1–1.2)
ANION GAP SERPL CALCULATED.3IONS-SCNC: 8 MMOL/L (ref 9–17)
BASOPHILS # BLD: 0 % (ref 0–2)
BASOPHILS ABSOLUTE: 0 K/UL (ref 0–0.2)
BUN BLDV-MCNC: 19 MG/DL (ref 6–20)
BUN/CREAT BLD: 34 (ref 9–20)
CALCIUM SERPL-MCNC: 9.4 MG/DL (ref 8.6–10.4)
CHLORIDE BLD-SCNC: 93 MMOL/L (ref 98–107)
CO2: 39 MMOL/L (ref 20–31)
CREAT SERPL-MCNC: 0.56 MG/DL (ref 0.7–1.2)
CULTURE: NO GROWTH
DIFFERENTIAL TYPE: ABNORMAL
EOSINOPHILS RELATIVE PERCENT: 0 % (ref 1–4)
GFR AFRICAN AMERICAN: >60 ML/MIN
GFR NON-AFRICAN AMERICAN: >60 ML/MIN
GFR SERPL CREATININE-BSD FRML MDRD: ABNORMAL ML/MIN/{1.73_M2}
GFR SERPL CREATININE-BSD FRML MDRD: ABNORMAL ML/MIN/{1.73_M2}
GLUCOSE BLD-MCNC: 132 MG/DL (ref 70–99)
HCT VFR BLD CALC: 40.4 % (ref 40.7–50.3)
HEMOGLOBIN: 12.8 G/DL (ref 13–17)
IMMATURE GRANULOCYTES: 1 %
LYMPHOCYTES # BLD: 12 % (ref 24–43)
Lab: NORMAL
MCH RBC QN AUTO: 33.4 PG (ref 25.2–33.5)
MCHC RBC AUTO-ENTMCNC: 31.7 G/DL (ref 28.4–34.8)
MCV RBC AUTO: 105.5 FL (ref 82.6–102.9)
MONOCYTES # BLD: 2 % (ref 3–12)
MORPHOLOGY: ABNORMAL
NRBC AUTOMATED: 0 PER 100 WBC
PDW BLD-RTO: 13.4 % (ref 11.8–14.4)
PLATELET # BLD: 261 K/UL (ref 138–453)
PLATELET ESTIMATE: ABNORMAL
PMV BLD AUTO: 9.1 FL (ref 8.1–13.5)
POTASSIUM SERPL-SCNC: 4.2 MMOL/L (ref 3.7–5.3)
RBC # BLD: 3.83 M/UL (ref 4.21–5.77)
RBC # BLD: ABNORMAL 10*6/UL
SEG NEUTROPHILS: 85 % (ref 36–65)
SEGMENTED NEUTROPHILS ABSOLUTE COUNT: 2.46 K/UL (ref 1.5–8.1)
SODIUM BLD-SCNC: 140 MMOL/L (ref 135–144)
SPECIMEN DESCRIPTION: NORMAL
STATUS: NORMAL
WBC # BLD: 2.9 K/UL (ref 3.5–11.3)
WBC # BLD: ABNORMAL 10*3/UL

## 2018-11-10 PROCEDURE — G8989 SELF CARE D/C STATUS: HCPCS

## 2018-11-10 PROCEDURE — G8978 MOBILITY CURRENT STATUS: HCPCS

## 2018-11-10 PROCEDURE — 97530 THERAPEUTIC ACTIVITIES: CPT

## 2018-11-10 PROCEDURE — G8979 MOBILITY GOAL STATUS: HCPCS

## 2018-11-10 PROCEDURE — 6370000000 HC RX 637 (ALT 250 FOR IP): Performed by: INTERNAL MEDICINE

## 2018-11-10 PROCEDURE — 94761 N-INVAS EAR/PLS OXIMETRY MLT: CPT

## 2018-11-10 PROCEDURE — 2700000000 HC OXYGEN THERAPY PER DAY

## 2018-11-10 PROCEDURE — 6360000002 HC RX W HCPCS: Performed by: INTERNAL MEDICINE

## 2018-11-10 PROCEDURE — 97165 OT EVAL LOW COMPLEX 30 MIN: CPT

## 2018-11-10 PROCEDURE — 85025 COMPLETE CBC W/AUTO DIFF WBC: CPT

## 2018-11-10 PROCEDURE — 80048 BASIC METABOLIC PNL TOTAL CA: CPT

## 2018-11-10 PROCEDURE — 82805 BLOOD GASES W/O2 SATURATION: CPT

## 2018-11-10 PROCEDURE — G8987 SELF CARE CURRENT STATUS: HCPCS

## 2018-11-10 PROCEDURE — 36600 WITHDRAWAL OF ARTERIAL BLOOD: CPT

## 2018-11-10 PROCEDURE — 2580000003 HC RX 258: Performed by: INTERNAL MEDICINE

## 2018-11-10 PROCEDURE — 36415 COLL VENOUS BLD VENIPUNCTURE: CPT

## 2018-11-10 PROCEDURE — G8988 SELF CARE GOAL STATUS: HCPCS

## 2018-11-10 PROCEDURE — 97161 PT EVAL LOW COMPLEX 20 MIN: CPT

## 2018-11-10 PROCEDURE — 2000000000 HC ICU R&B

## 2018-11-10 PROCEDURE — 94660 CPAP INITIATION&MGMT: CPT

## 2018-11-10 PROCEDURE — 94640 AIRWAY INHALATION TREATMENT: CPT

## 2018-11-10 RX ORDER — IPRATROPIUM BROMIDE AND ALBUTEROL SULFATE 2.5; .5 MG/3ML; MG/3ML
1 SOLUTION RESPIRATORY (INHALATION) 3 TIMES DAILY
Status: DISCONTINUED | OUTPATIENT
Start: 2018-11-10 | End: 2018-11-15

## 2018-11-10 RX ADMIN — Medication 2 PUFF: at 19:21

## 2018-11-10 RX ADMIN — APIXABAN 5 MG: 5 TABLET, FILM COATED ORAL at 08:22

## 2018-11-10 RX ADMIN — MONTELUKAST SODIUM 10 MG: 10 TABLET, FILM COATED ORAL at 08:22

## 2018-11-10 RX ADMIN — AZITHROMYCIN MONOHYDRATE 500 MG: 500 INJECTION, POWDER, LYOPHILIZED, FOR SOLUTION INTRAVENOUS at 20:32

## 2018-11-10 RX ADMIN — METHYLPREDNISOLONE SODIUM SUCCINATE 60 MG: 125 INJECTION, POWDER, FOR SOLUTION INTRAMUSCULAR; INTRAVENOUS at 02:44

## 2018-11-10 RX ADMIN — FUROSEMIDE 40 MG: 40 TABLET ORAL at 08:22

## 2018-11-10 RX ADMIN — CITALOPRAM HYDROBROMIDE 40 MG: 20 TABLET ORAL at 08:22

## 2018-11-10 RX ADMIN — TAMSULOSIN HYDROCHLORIDE 0.4 MG: 0.4 CAPSULE ORAL at 08:22

## 2018-11-10 RX ADMIN — IPRATROPIUM BROMIDE AND ALBUTEROL SULFATE 1 AMPULE: .5; 3 SOLUTION RESPIRATORY (INHALATION) at 09:55

## 2018-11-10 RX ADMIN — FAMOTIDINE 20 MG: 20 TABLET ORAL at 08:22

## 2018-11-10 RX ADMIN — METHYLPREDNISOLONE SODIUM SUCCINATE 60 MG: 125 INJECTION, POWDER, FOR SOLUTION INTRAMUSCULAR; INTRAVENOUS at 14:19

## 2018-11-10 RX ADMIN — IPRATROPIUM BROMIDE AND ALBUTEROL SULFATE 1 AMPULE: .5; 3 SOLUTION RESPIRATORY (INHALATION) at 15:06

## 2018-11-10 RX ADMIN — POTASSIUM CHLORIDE 20 MEQ: 20 TABLET, EXTENDED RELEASE ORAL at 08:22

## 2018-11-10 RX ADMIN — CEFTRIAXONE 1 G: 1 INJECTION, POWDER, FOR SOLUTION INTRAMUSCULAR; INTRAVENOUS at 20:34

## 2018-11-10 RX ADMIN — PSEUDOEPHEDRINE HYDROCHLORIDE 120 MG: 120 TABLET, FILM COATED, EXTENDED RELEASE ORAL at 20:36

## 2018-11-10 RX ADMIN — PSEUDOEPHEDRINE HYDROCHLORIDE 120 MG: 120 TABLET, FILM COATED, EXTENDED RELEASE ORAL at 08:22

## 2018-11-10 RX ADMIN — FAMOTIDINE 20 MG: 20 TABLET ORAL at 20:36

## 2018-11-10 RX ADMIN — Medication 2 PUFF: at 09:54

## 2018-11-10 RX ADMIN — IPRATROPIUM BROMIDE AND ALBUTEROL SULFATE 1 AMPULE: .5; 3 SOLUTION RESPIRATORY (INHALATION) at 19:21

## 2018-11-10 RX ADMIN — SODIUM CHLORIDE: 9 INJECTION, SOLUTION INTRAVENOUS at 08:33

## 2018-11-10 RX ADMIN — APIXABAN 5 MG: 5 TABLET, FILM COATED ORAL at 20:36

## 2018-11-10 RX ADMIN — DILTIAZEM HYDROCHLORIDE 180 MG: 180 CAPSULE, COATED, EXTENDED RELEASE ORAL at 08:22

## 2018-11-10 ASSESSMENT — PAIN DESCRIPTION - LOCATION: LOCATION: RIB CAGE;HEAD

## 2018-11-10 ASSESSMENT — PAIN SCALES - GENERAL: PAINLEVEL_OUTOF10: 8

## 2018-11-10 ASSESSMENT — PAIN DESCRIPTION - ORIENTATION: ORIENTATION: LEFT

## 2018-11-10 NOTE — PLAN OF CARE
Problem: Pain:  Goal: Pain level will decrease  Pain level will decrease   Outcome: Ongoing  Continuing to assess and monitor. Problem: Falls - Risk of:  Goal: Will remain free from falls  Will remain free from falls   Outcome: Ongoing  Continuing to assess and monitor. Fall risk assessed daily.

## 2018-11-10 NOTE — H&P
Albino Aguirre M.D. Internal Medicine History and Physical 11/10/18    Patient:  Tamanna Campbell  MRN: [id number]    Chief Complaint:  Shortness of breath    History Obtained From:  patient, electronic medical record  PCP: ABHIJEET Dorado CNP    History of Present Illness: The patient is a 48 y.o. male well known to hospitalist service from 4 previous admissions for COPD since June (most recently admitted by myself 10/8/18-10/15/18), who presents with progressively worsening shortness of breath. He has known end stage COPD and continues to smoke up to a quarter to half pack of cigarettes per day. He has been counseled extensively during previous admissions on smoking cessation. His SOB has been progressively worse over the past couple of days. He has had fever and chills, but did not check his temperature. He had headaches and body aches. He has chronic respiratory failure on 2L home O2, but this hasn't been helping him catch his breath. He denies chest pain, although during a previous admission had been found to have fractured ribs. He states these have healed well and don't hurt much anymore. He is admitted to ICU for COPD exacerbation and acute on chronic respiratory failure requring BiPAP support. Past Medical History:        Diagnosis Date    Arthritis     COPD (chronic obstructive pulmonary disease) ([facility] [address]     Hypertension     Oxygen dependent     Scoliosis        Past Surgical History:        Procedure Laterality Date    FACIAL RECONSTRUCTION SURGERY Left 4/13/2017    FACIAL LESION BIOPSY EXCISION LEFT CHEEK performed by Ramy Gallego DO at [facility] Left 04/13/2017    facial lesion biopsy    TONSILLECTOMY         Medications Prior to Admission:    Prior to Admission medications    Medication Sig Start Date End Date Taking? Authorizing Provider   HYDROcodone-acetaminophen (NORCO) 5-325 MG per tablet Take 1 tablet by mouth every 6 hours as needed for Pain. Sabina Gobble Historical Provider, MD   predniSONE (DELTASONE) 10 MG tablet Take 3 tabs po bid X 3 days, then 2 tabs po bid X 3 days, then 1 tab po bid X 3 days, then 1 tab po daily until script complete 10/15/18   Lina Singh PA-C   furosemide (LASIX) 40 MG tablet Take 1 tablet by mouth daily for 7 days 10/15/18 10/22/18  Lina Singh PA-C   CPAP Machine MISC by Does not apply route USE AS DIRECTED 10/5/18   ABHIJEET Vergara CNP   Nutritional Supplements (ENSURE COMPLETE) LIQD Take 2 Bottles by mouth daily 10/5/18   ABHIJEET Vergara CNP   loperamide (IMODIUM) 2 MG capsule Take 2 mg by mouth 4 times daily as needed for Diarrhea    Historical Provider, MD   magnesium hydroxide (MILK OF MAGNESIA) 400 MG/5ML suspension Take by mouth daily as needed for Constipation    Historical Provider, MD   aluminum & magnesium hydroxide-simethicone (MAALOX) 200-200-20 MG/5ML SUSP suspension Take 15 mLs by mouth every 6 hours as needed for Indigestion    Historical Provider, MD       Allergies:  Patient has no known allergies. Social History:   TOBACCO:   reports that he has been smoking Cigarettes. He has been smoking about 0.10 packs per day. He has never used smokeless tobacco.  ETOH:   reports that he does not drink alcohol. Family History:   Family History   Problem Relation Age of Onset    Stroke Mother     Emphysema Father     Cancer Father     Diabetes Paternal Grandmother     Stroke Paternal Grandmother        Review of Systems:  Constitutional:positive  for fevers, and positive for chills.   Respiratory: positive for shortness of breath, positive for cough, and positive for wheezing  Cardiovascular: negative for chest pain, negative for palpitations, and negative for syncope  Gastrointestinal: negative for abdominal pain, negative for nausea,negative for vomiting, negative for diarrhea, negative for constipation, and negative for hematochezia or melena  Genitourinary: negative for dysuria, negative for urinary urgency, negative for urinary frequency, and negative for hematuria  Neurological: negative for unilateral weakness, numbness or tingling. All other systems were reviewed with the patient and are negative except as stated    Objective:    Vitals:   Temp: 97.7 °F (36.5 °C)  BP: (!) 124/95  Resp: (!) 35  Pulse: 101  SpO2: 96 %    24HR INTAKE/OUTPUT:    Intake/Output Summary (Last 24 hours) at 11/10/18 0750  Last data filed at 11/10/18 0701   Gross per 24 hour   Intake             1178 ml   Output              250 ml   Net              928 ml      -----------------------------------------------------------------  Exam:  GEN:    Awake, alert and oriented x 3. no acute distress. Well developed / well nourished. EYES:  EOMI, pupils equal   ENT:  Neck supple. No lymphadenopathy. No carotid bruit  CVS:    tachycardic but regular. sinus tach on telemetry  PULM:  diminished. no rhonchi.  end exp wheezing noted  ABD:    Bowels sounds normal.  Abdomen is soft. No distention. No tenderness. EXT:   no edema bilaterally . No calf tenderness. NEURO: Motor and sensory are intact  PSYCH:  Normal mood and affect  SKIN:  No rashes. No skin lesions.     LINES: antecubital IV in place, no signs of infection      Diagnostic Data:    · All lines, drips, IV sites and medications reviewed  · All available data reviewed  Lab Results   Component Value Date    WBC 2.9 (L) 11/10/2018    HGB 12.8 (L) 11/10/2018    .5 (H) 11/10/2018     11/10/2018     Lab Results   Component Value Date    SEGS Pending 11/10/2018    LYMPHOPCT Pending 11/10/2018    MONOPCT Pending 11/10/2018    EOSRELPCT Pending 11/10/2018    BASOPCT Pending 11/10/2018    NEUTROABS Pending 11/10/2018    Lab Results   Component Value Date    GLUCOSE 132 (H) 11/10/2018    BUN 19 11/10/2018    CREATININE 0.56 (L) 11/10/2018     11/10/2018    K 4.2 11/10/2018    CALCIUM 9.4 11/10/2018    CL 93 (L) 11/10/2018    CO2 39 (H) 11/10/2018 EXAMINATION:   CT OF THE CHEST WITHOUT CONTRAST 11/9/2018 3:42 pm       TECHNIQUE:   CT of the chest was performed without the administration of intravenous   contrast. Multiplanar reformatted images are provided for review. Dose   modulation, iterative reconstruction, and/or weight based adjustment of the   mA/kV was utilized to reduce the radiation dose to as low as reasonably   achievable.       COMPARISON:   08/15/2018       HISTORY:   ORDERING SYSTEM PROVIDED HISTORY: COPD EXACERBATION, ELEVATED WBC OR PAIN OR   CAD OR CHF       FINDINGS:   Mediastinum: The cardiac size is normal. There is no significant mediastinal,   hilar or axillary lymphadenopathy. The thyroid gland shows no significant   abnormalities.  The esophagus shows no significant abnormalities.       Lungs/pleura: Moderate to severe centrilobular emphysema unchanged.       There is a new irregular somewhat spiculated right lung apex nodule 1.5 x 1.5   cm with thick pleural tag to the right. [employer] is also a new irregular solid   somewhat spiculated nodule in the medial basal right lower lobe 1.5 x 0.9 cm. There may be a tiny satellite nodule anterior to the right. Caro Stevens findings   are in a larger area of septal thickening.  Possibility of this in nodules   being neoplastic is not excluded.       There are 2 subpleural nodules in the posterior right lower lobe larger of   which measures 6 mm (series 3, image 95 and 93).     No pleural effusion or pneumothorax.       Upper Abdomen: No significant abnormalities.       Soft Tissues/Bones: No acute abnormality of the bones.  The superficial soft   tissues show no significant abnormalities.       Impression   1. There are 2 new nodules which are irregular and somewhat spiculated   measuring 1.5 x 1.5 cm and 1.5 x 0.9 cm in the right lung apex and medial   basal right lower lobe respectively.  Neoplasm not excluded.  PET-CT or   tissue sampling is recommended.    2. Moderate to severe

## 2018-11-10 NOTE — PROGRESS NOTES
Patient noted to be standing in front of chair, personal alarm not sounding. Upon entering room patient tells writer he wants to look in bathroom for his cell phone. Encouraged to sit back down, that he is attached to cables. Personal alarm noted to be disconnected from patient. Cooperates to sit back down. RFA replaced. Girlfriend in to visit and has his cell phone.

## 2018-11-10 NOTE — PROGRESS NOTES
Independent  Homemaking Responsibilities: No  Ambulation Assistance: Independent  Transfer Assistance: Independent  Active : No  Occupation: On disability  Additional Comments: Pt. reports independence with all home activities prior to illness, but is very confused this date and unable to report accurately. Pt. came from assisted living facility. Objective   Vision: Within Functional Limits  Hearing: Within functional limits    Orientation  Overall Orientation Status: Within Functional Limits     Functional Mobility  Functional - Mobility Device: Rolling Walker  Functional Mobility Comments: good sitting mobility; dynamic sit fair, dynamic stand fair -   ADL  Feeding: Setup  Grooming: Increased time to complete  UE Bathing: Increased time to complete  LE Bathing: Increased time to complete  UE Dressing: Increased time to complete  LE Dressing: Increased time to complete  Toileting: Independent        Bed mobility  Supine to Sit: Modified independent  Sit to Supine: Modified independent                          LUE PROM (degrees)  LUE PROM: WFL  LUE AROM (degrees)  LUE AROM : WFL  LUE Strength  L Hand Grasp: 4/5  LUE Strength Comment: 4-  RUE Strength  R Hand Grasp: 4/5  RUE Strength Comment: 4-                  Assessment   Performance deficits / Impairments: Decreased functional mobility ; Decreased ADL status; Decreased cognition;Decreased safe awareness;Decreased strength;Decreased endurance;Decreased balance  Treatment Diagnosis: debility  Prognosis: Good  Decision Making: Low Complexity  Patient Education: intro to energy conservation techniques, with limited understanding expressed  REQUIRES OT FOLLOW UP: Yes  Activity Tolerance  Activity Tolerance: Patient limited by fatigue;Treatment limited secondary to decreased cognition;Patient limited by pain  Safety Devices  Safety Devices in place: Yes         Plan   Plan  Times per week: 7  Times per day: Daily  Plan weeks: 10 weeks 2x/day; daily

## 2018-11-10 NOTE — PROGRESS NOTES
Sitting up in chair. Respirations easy. Staff, environmental, spiritual and therapies, all reporting patient hallucinating. Writer has witnessed behaviors as well. Will monitor.

## 2018-11-11 LAB
ABSOLUTE EOS #: 0 K/UL (ref 0–0.44)
ABSOLUTE IMMATURE GRANULOCYTE: 0 K/UL (ref 0–0.3)
ABSOLUTE LYMPH #: 0.44 K/UL (ref 1.1–3.7)
ABSOLUTE MONO #: 0.09 K/UL (ref 0.1–1.2)
ALLEN TEST: ABNORMAL
ANION GAP SERPL CALCULATED.3IONS-SCNC: 10 MMOL/L (ref 9–17)
BASOPHILS # BLD: 0 % (ref 0–2)
BASOPHILS ABSOLUTE: 0 K/UL (ref 0–0.2)
BUN BLDV-MCNC: 19 MG/DL (ref 6–20)
BUN/CREAT BLD: 35 (ref 9–20)
CALCIUM SERPL-MCNC: 8.8 MG/DL (ref 8.6–10.4)
CARBOXYHEMOGLOBIN: ABNORMAL % (ref 0–5)
CHLORIDE BLD-SCNC: 95 MMOL/L (ref 98–107)
CO2: 36 MMOL/L (ref 20–31)
CREAT SERPL-MCNC: 0.55 MG/DL (ref 0.7–1.2)
DIFFERENTIAL TYPE: ABNORMAL
EOSINOPHILS RELATIVE PERCENT: 0 % (ref 1–4)
FIO2: ABNORMAL
GFR AFRICAN AMERICAN: >60 ML/MIN
GFR NON-AFRICAN AMERICAN: >60 ML/MIN
GFR SERPL CREATININE-BSD FRML MDRD: ABNORMAL ML/MIN/{1.73_M2}
GFR SERPL CREATININE-BSD FRML MDRD: ABNORMAL ML/MIN/{1.73_M2}
GLUCOSE BLD-MCNC: 126 MG/DL (ref 70–99)
HCO3 ARTERIAL: 32.4 MMOL/L (ref 22–26)
HCT VFR BLD CALC: 34.6 % (ref 40.7–50.3)
HEMOGLOBIN: 11.7 G/DL (ref 13–17)
IMMATURE GRANULOCYTES: 0 %
LYMPHOCYTES # BLD: 5 % (ref 24–43)
MCH RBC QN AUTO: 33.8 PG (ref 25.2–33.5)
MCHC RBC AUTO-ENTMCNC: 33.8 G/DL (ref 28.4–34.8)
MCV RBC AUTO: 100 FL (ref 82.6–102.9)
METHEMOGLOBIN: ABNORMAL % (ref 0–1.9)
MODE: ABNORMAL
MONOCYTES # BLD: 1 % (ref 3–12)
MORPHOLOGY: NORMAL
NEGATIVE BASE EXCESS, ART: ABNORMAL MMOL/L (ref 0–2)
NOTIFICATION TIME: ABNORMAL
NOTIFICATION: ABNORMAL
NRBC AUTOMATED: 0 PER 100 WBC
O2 DEVICE/FLOW/%: ABNORMAL
O2 SAT, ARTERIAL: 81.3 % (ref 95–98)
OXYHEMOGLOBIN: ABNORMAL % (ref 95–98)
PATIENT TEMP: 37
PCO2 ARTERIAL: 52.7 MMHG (ref 35–45)
PCO2, ART, TEMP ADJ: ABNORMAL (ref 35–45)
PDW BLD-RTO: 13.6 % (ref 11.8–14.4)
PEEP/CPAP: ABNORMAL
PH ARTERIAL: 7.41 (ref 7.35–7.45)
PH, ART, TEMP ADJ: ABNORMAL (ref 7.35–7.45)
PLATELET # BLD: 252 K/UL (ref 138–453)
PLATELET ESTIMATE: ABNORMAL
PMV BLD AUTO: 9.1 FL (ref 8.1–13.5)
PO2 ARTERIAL: 45.8 MMHG (ref 80–100)
PO2, ART, TEMP ADJ: ABNORMAL MMHG (ref 80–100)
POSITIVE BASE EXCESS, ART: 6.1 MMOL/L (ref 0–2)
POTASSIUM SERPL-SCNC: 3.7 MMOL/L (ref 3.7–5.3)
PSV: ABNORMAL
PT. POSITION: ABNORMAL
RBC # BLD: 3.46 M/UL (ref 4.21–5.77)
RBC # BLD: ABNORMAL 10*6/UL
RESPIRATORY RATE: 17
SAMPLE SITE: ABNORMAL
SEG NEUTROPHILS: 94 % (ref 36–65)
SEGMENTED NEUTROPHILS ABSOLUTE COUNT: 8.27 K/UL (ref 1.5–8.1)
SET RATE: ABNORMAL
SODIUM BLD-SCNC: 141 MMOL/L (ref 135–144)
TEXT FOR RESPIRATORY: ABNORMAL
TOTAL HB: ABNORMAL G/DL (ref 12–16)
TOTAL RATE: ABNORMAL
VT: ABNORMAL
WBC # BLD: 8.8 K/UL (ref 3.5–11.3)
WBC # BLD: ABNORMAL 10*3/UL

## 2018-11-11 PROCEDURE — 6370000000 HC RX 637 (ALT 250 FOR IP): Performed by: INTERNAL MEDICINE

## 2018-11-11 PROCEDURE — 94640 AIRWAY INHALATION TREATMENT: CPT

## 2018-11-11 PROCEDURE — 2000000000 HC ICU R&B

## 2018-11-11 PROCEDURE — 85025 COMPLETE CBC W/AUTO DIFF WBC: CPT

## 2018-11-11 PROCEDURE — 2700000000 HC OXYGEN THERAPY PER DAY

## 2018-11-11 PROCEDURE — 97110 THERAPEUTIC EXERCISES: CPT

## 2018-11-11 PROCEDURE — 97535 SELF CARE MNGMENT TRAINING: CPT

## 2018-11-11 PROCEDURE — 6360000002 HC RX W HCPCS: Performed by: INTERNAL MEDICINE

## 2018-11-11 PROCEDURE — 6360000002 HC RX W HCPCS

## 2018-11-11 PROCEDURE — 82805 BLOOD GASES W/O2 SATURATION: CPT

## 2018-11-11 PROCEDURE — 36415 COLL VENOUS BLD VENIPUNCTURE: CPT

## 2018-11-11 PROCEDURE — 2500000003 HC RX 250 WO HCPCS: Performed by: INTERNAL MEDICINE

## 2018-11-11 PROCEDURE — 93005 ELECTROCARDIOGRAM TRACING: CPT

## 2018-11-11 PROCEDURE — 80048 BASIC METABOLIC PNL TOTAL CA: CPT

## 2018-11-11 PROCEDURE — 94660 CPAP INITIATION&MGMT: CPT

## 2018-11-11 PROCEDURE — 2580000003 HC RX 258: Performed by: INTERNAL MEDICINE

## 2018-11-11 RX ORDER — DILTIAZEM HYDROCHLORIDE 5 MG/ML
INJECTION INTRAVENOUS
Status: DISPENSED
Start: 2018-11-11 | End: 2018-11-12

## 2018-11-11 RX ORDER — LORAZEPAM 2 MG/ML
INJECTION INTRAMUSCULAR
Status: COMPLETED
Start: 2018-11-11 | End: 2018-11-11

## 2018-11-11 RX ORDER — MULTIVITAMIN WITH FOLIC ACID 400 MCG
1 TABLET ORAL DAILY
Status: DISCONTINUED | OUTPATIENT
Start: 2018-11-11 | End: 2018-11-16 | Stop reason: HOSPADM

## 2018-11-11 RX ORDER — THIAMINE HYDROCHLORIDE 100 MG/ML
100 INJECTION, SOLUTION INTRAMUSCULAR; INTRAVENOUS ONCE
Status: COMPLETED | OUTPATIENT
Start: 2018-11-11 | End: 2018-11-11

## 2018-11-11 RX ORDER — LORAZEPAM 2 MG/ML
0.5 INJECTION INTRAMUSCULAR EVERY 6 HOURS PRN
Status: DISCONTINUED | OUTPATIENT
Start: 2018-11-11 | End: 2018-11-11

## 2018-11-11 RX ORDER — LORAZEPAM 2 MG/ML
0.5 INJECTION INTRAMUSCULAR EVERY 4 HOURS PRN
Status: DISCONTINUED | OUTPATIENT
Start: 2018-11-11 | End: 2018-11-16 | Stop reason: HOSPADM

## 2018-11-11 RX ORDER — LORAZEPAM 2 MG/ML
0.5 INJECTION INTRAMUSCULAR ONCE
Status: COMPLETED | OUTPATIENT
Start: 2018-11-11 | End: 2018-11-11

## 2018-11-11 RX ADMIN — AZITHROMYCIN MONOHYDRATE 500 MG: 500 INJECTION, POWDER, LYOPHILIZED, FOR SOLUTION INTRAVENOUS at 19:44

## 2018-11-11 RX ADMIN — CITALOPRAM HYDROBROMIDE 40 MG: 20 TABLET ORAL at 08:40

## 2018-11-11 RX ADMIN — TAMSULOSIN HYDROCHLORIDE 0.4 MG: 0.4 CAPSULE ORAL at 08:40

## 2018-11-11 RX ADMIN — LORAZEPAM 0.5 MG: 2 INJECTION INTRAMUSCULAR at 21:19

## 2018-11-11 RX ADMIN — Medication 2 PUFF: at 09:05

## 2018-11-11 RX ADMIN — SODIUM CHLORIDE: 9 INJECTION, SOLUTION INTRAVENOUS at 11:15

## 2018-11-11 RX ADMIN — FAMOTIDINE 20 MG: 20 TABLET ORAL at 20:14

## 2018-11-11 RX ADMIN — THIAMINE HYDROCHLORIDE 100 MG: 100 INJECTION, SOLUTION INTRAMUSCULAR; INTRAVENOUS at 13:04

## 2018-11-11 RX ADMIN — DILTIAZEM HYDROCHLORIDE 180 MG: 180 CAPSULE, COATED, EXTENDED RELEASE ORAL at 08:40

## 2018-11-11 RX ADMIN — Medication 2 PUFF: at 20:08

## 2018-11-11 RX ADMIN — IPRATROPIUM BROMIDE AND ALBUTEROL SULFATE 1 AMPULE: .5; 3 SOLUTION RESPIRATORY (INHALATION) at 15:07

## 2018-11-11 RX ADMIN — Medication 10 ML: at 20:04

## 2018-11-11 RX ADMIN — LORAZEPAM 0.5 MG: 2 INJECTION INTRAMUSCULAR; INTRAVENOUS at 20:06

## 2018-11-11 RX ADMIN — CEFTRIAXONE 1 G: 1 INJECTION, POWDER, FOR SOLUTION INTRAMUSCULAR; INTRAVENOUS at 19:40

## 2018-11-11 RX ADMIN — MONTELUKAST SODIUM 10 MG: 10 TABLET, FILM COATED ORAL at 08:40

## 2018-11-11 RX ADMIN — METHYLPREDNISOLONE SODIUM SUCCINATE 60 MG: 125 INJECTION, POWDER, FOR SOLUTION INTRAMUSCULAR; INTRAVENOUS at 13:47

## 2018-11-11 RX ADMIN — IPRATROPIUM BROMIDE AND ALBUTEROL SULFATE 1 AMPULE: .5; 3 SOLUTION RESPIRATORY (INHALATION) at 09:05

## 2018-11-11 RX ADMIN — APIXABAN 5 MG: 5 TABLET, FILM COATED ORAL at 08:40

## 2018-11-11 RX ADMIN — LORAZEPAM 0.5 MG: 2 INJECTION INTRAMUSCULAR; INTRAVENOUS at 13:03

## 2018-11-11 RX ADMIN — METHYLPREDNISOLONE SODIUM SUCCINATE 60 MG: 125 INJECTION, POWDER, FOR SOLUTION INTRAMUSCULAR; INTRAVENOUS at 02:33

## 2018-11-11 RX ADMIN — HYDROCODONE BITARTRATE AND ACETAMINOPHEN 1 TABLET: 5; 325 TABLET ORAL at 23:27

## 2018-11-11 RX ADMIN — PSEUDOEPHEDRINE HYDROCHLORIDE 120 MG: 120 TABLET, FILM COATED, EXTENDED RELEASE ORAL at 08:40

## 2018-11-11 RX ADMIN — IPRATROPIUM BROMIDE AND ALBUTEROL SULFATE 1 AMPULE: .5; 3 SOLUTION RESPIRATORY (INHALATION) at 20:08

## 2018-11-11 RX ADMIN — LORAZEPAM 0.5 MG: 2 INJECTION INTRAMUSCULAR; INTRAVENOUS at 21:19

## 2018-11-11 RX ADMIN — FAMOTIDINE 20 MG: 20 TABLET ORAL at 08:40

## 2018-11-11 RX ADMIN — DILTIAZEM HYDROCHLORIDE 5 MG/HR: 5 INJECTION INTRAVENOUS at 18:55

## 2018-11-11 RX ADMIN — HYDROCODONE BITARTRATE AND ACETAMINOPHEN 1 TABLET: 5; 325 TABLET ORAL at 11:12

## 2018-11-11 RX ADMIN — FUROSEMIDE 40 MG: 40 TABLET ORAL at 08:40

## 2018-11-11 RX ADMIN — PSEUDOEPHEDRINE HYDROCHLORIDE 120 MG: 120 TABLET, FILM COATED, EXTENDED RELEASE ORAL at 20:14

## 2018-11-11 RX ADMIN — APIXABAN 5 MG: 5 TABLET, FILM COATED ORAL at 20:14

## 2018-11-11 RX ADMIN — POTASSIUM CHLORIDE 20 MEQ: 20 TABLET, EXTENDED RELEASE ORAL at 08:40

## 2018-11-11 RX ADMIN — LOPERAMIDE HYDROCHLORIDE 2 MG: 2 CAPSULE ORAL at 08:57

## 2018-11-11 RX ADMIN — THERA TABS 1 TABLET: TAB at 13:05

## 2018-11-11 ASSESSMENT — PAIN SCALES - GENERAL
PAINLEVEL_OUTOF10: 5
PAINLEVEL_OUTOF10: 0
PAINLEVEL_OUTOF10: 6

## 2018-11-11 ASSESSMENT — PAIN DESCRIPTION - ORIENTATION: ORIENTATION: LOWER;UPPER

## 2018-11-11 ASSESSMENT — PAIN DESCRIPTION - LOCATION
LOCATION: BACK
LOCATION: BACK;NECK

## 2018-11-11 ASSESSMENT — PAIN DESCRIPTION - DESCRIPTORS: DESCRIPTORS: ACHING

## 2018-11-11 ASSESSMENT — PAIN DESCRIPTION - PAIN TYPE
TYPE: CHRONIC PAIN
TYPE: CHRONIC PAIN

## 2018-11-11 ASSESSMENT — PAIN DESCRIPTION - DIRECTION: RADIATING_TOWARDS: NECK

## 2018-11-11 NOTE — PROGRESS NOTES
Writer walked into room to find patient had disconnected his oxygen tubing for an unknown amount of time. O2 reconnected and ABG drawn 10 minutes after. ABG sample may be mixed with venous, although blood filled the syringe like arterial blood. Patient has uncontrollable shaking in both upper extremities. Attempted to use dopplar d/t tiny arteries/pt shaking but it did not  a pulse.

## 2018-11-11 NOTE — PROGRESS NOTES
x15  Hip Abduction: x15  Knee Long Arc Quad: x15  Knee Short Arc Quad: x15  Ankle Pumps: x15  Comments: BLE ther ex in seated and supine. Assessment   Treatment Diagnosis: Difficulty walking  Prognosis: Good  Patient Education: Educated on proper breathing technique. REQUIRES PT FOLLOW UP: Yes  Activity Tolerance  Activity Tolerance: Patient Tolerated treatment well;Patient limited by fatigue;Patient limited by endurance     G-Code     OutComes Score                                                    AM-PAC Score             Goals  Short term goals  Time Frame for Short term goals: 10 days  Short term goal 1: Pt. to amb. 100ft with FWW and SBA with O2 as needed in order to improve functional mobility. Short term goal 2: Pt. to complete all sit-stand transfers with FWW and SBA with no vc's required for technique. Short term goal 3: Pt. to be able to mary. 20-30 minutes of ther ex for improved functional endurance. Short term goal 4: Pt. to have good static and dynamic standing balance for decreased fall risk. Patient Goals   Patient goals : To be able to walk longer distances and breathe better    Plan    Plan  Times per week: 7  Times per day: Twice a day (daily on weekends)  Current Treatment Recommendations: Strengthening, Balance Training, Functional Mobility Training, Transfer Training, Gait Training, Endurance Training, Neuromuscular Re-education, Safety Education & Training, Patient/Caregiver Education & Training  Safety Devices  Type of devices:  All fall risk precautions in place, Call light within reach, Nurse notified, Gait belt, Patient at risk for falls, Bed alarm in place, Left in bed     Therapy Time   Individual Concurrent Group Co-treatment   Time In 0947         Time Out 1005         Minutes [address], PTA

## 2018-11-11 NOTE — PROGRESS NOTES
Patient remains awake. Patient has not gotten any sleep this shift. Patient continues to fidget, and mess with wires. Patient is able to answer questions appropriately, but at times is having hallucinations. Patient called out because there was a mouse in bed that had tangled up his wires. Reorientation provided. Sleep encouraged. Patient agrees.

## 2018-11-11 NOTE — PLAN OF CARE
Problem: Pain:  Goal: Pain level will decrease  Pain level will decrease   Outcome: Ongoing  Pain assessment completed routinely. Patient has denies pain so far today. Will continue to monitor and medicate as needed. Problem: Falls - Risk of:  Goal: Will remain free from falls  Will remain free from falls   Outcome: Ongoing  Patient at high risk for falls. Fall precautions in place. Non skid slipper socks on, bed in lowest position with wheels locked and siderails up x2, bed alarm on. Patient is being monitored on a regular basis .  Call light within reach

## 2018-11-12 LAB
ABSOLUTE EOS #: 0 K/UL (ref 0–0.44)
ABSOLUTE IMMATURE GRANULOCYTE: 0 K/UL (ref 0–0.3)
ABSOLUTE LYMPH #: 0.57 K/UL (ref 1.1–3.7)
ABSOLUTE MONO #: 0.24 K/UL (ref 0.1–1.2)
ALLEN TEST: ABNORMAL
ANION GAP SERPL CALCULATED.3IONS-SCNC: 8 MMOL/L (ref 9–17)
BASOPHILS # BLD: 0 % (ref 0–2)
BASOPHILS ABSOLUTE: 0 K/UL (ref 0–0.2)
BUN BLDV-MCNC: 19 MG/DL (ref 6–20)
BUN/CREAT BLD: 46 (ref 9–20)
CALCIUM SERPL-MCNC: 8.6 MG/DL (ref 8.6–10.4)
CARBOXYHEMOGLOBIN: ABNORMAL % (ref 0–5)
CHLORIDE BLD-SCNC: 96 MMOL/L (ref 98–107)
CO2: 36 MMOL/L (ref 20–31)
CREAT SERPL-MCNC: 0.41 MG/DL (ref 0.7–1.2)
DIFFERENTIAL TYPE: ABNORMAL
EOSINOPHILS RELATIVE PERCENT: 0 % (ref 1–4)
FIO2: ABNORMAL
GFR AFRICAN AMERICAN: >60 ML/MIN
GFR NON-AFRICAN AMERICAN: >60 ML/MIN
GFR SERPL CREATININE-BSD FRML MDRD: ABNORMAL ML/MIN/{1.73_M2}
GFR SERPL CREATININE-BSD FRML MDRD: ABNORMAL ML/MIN/{1.73_M2}
GLUCOSE BLD-MCNC: 113 MG/DL (ref 70–99)
HCO3 ARTERIAL: 40.5 MMOL/L (ref 22–26)
HCT VFR BLD CALC: 33.8 % (ref 40.7–50.3)
HEMOGLOBIN: 11 G/DL (ref 13–17)
IMMATURE GRANULOCYTES: 0 %
LYMPHOCYTES # BLD: 7 % (ref 24–43)
MCH RBC QN AUTO: 33.4 PG (ref 25.2–33.5)
MCHC RBC AUTO-ENTMCNC: 32.5 G/DL (ref 28.4–34.8)
MCV RBC AUTO: 102.7 FL (ref 82.6–102.9)
METHEMOGLOBIN: ABNORMAL % (ref 0–1.9)
MODE: ABNORMAL
MONOCYTES # BLD: 3 % (ref 3–12)
MORPHOLOGY: NORMAL
NEGATIVE BASE EXCESS, ART: ABNORMAL MMOL/L (ref 0–2)
NOTIFICATION TIME: ABNORMAL
NOTIFICATION: ABNORMAL
NRBC AUTOMATED: 0 PER 100 WBC
O2 DEVICE/FLOW/%: ABNORMAL
O2 SAT, ARTERIAL: 97.4 % (ref 95–98)
OXYHEMOGLOBIN: ABNORMAL % (ref 95–98)
PATIENT TEMP: 37
PCO2 ARTERIAL: 57.5 MMHG (ref 35–45)
PCO2, ART, TEMP ADJ: ABNORMAL
PDW BLD-RTO: 14 % (ref 11.8–14.4)
PEEP/CPAP: ABNORMAL
PH ARTERIAL: 7.47 (ref 7.35–7.45)
PH, ART, TEMP ADJ: ABNORMAL (ref 7.35–7.45)
PLATELET # BLD: 239 K/UL (ref 138–453)
PLATELET ESTIMATE: ABNORMAL
PMV BLD AUTO: 9.2 FL (ref 8.1–13.5)
PO2 ARTERIAL: 94 MMHG (ref 80–100)
PO2, ART, TEMP ADJ: ABNORMAL MMHG (ref 80–100)
POSITIVE BASE EXCESS, ART: 13.9 MMOL/L (ref 0–2)
POTASSIUM SERPL-SCNC: 3.7 MMOL/L (ref 3.7–5.3)
PSV: ABNORMAL
PT. POSITION: ABNORMAL
RBC # BLD: 3.29 M/UL (ref 4.21–5.77)
RBC # BLD: ABNORMAL 10*6/UL
RESPIRATORY RATE: 17
SAMPLE SITE: ABNORMAL
SEG NEUTROPHILS: 90 % (ref 36–65)
SEGMENTED NEUTROPHILS ABSOLUTE COUNT: 7.29 K/UL (ref 1.5–8.1)
SET RATE: ABNORMAL
SODIUM BLD-SCNC: 140 MMOL/L (ref 135–144)
TEXT FOR RESPIRATORY: ABNORMAL
TOTAL HB: ABNORMAL G/DL (ref 12–16)
TOTAL RATE: ABNORMAL
VT: ABNORMAL
WBC # BLD: 8.1 K/UL (ref 3.5–11.3)
WBC # BLD: ABNORMAL 10*3/UL

## 2018-11-12 PROCEDURE — 97116 GAIT TRAINING THERAPY: CPT

## 2018-11-12 PROCEDURE — 2000000000 HC ICU R&B

## 2018-11-12 PROCEDURE — 94640 AIRWAY INHALATION TREATMENT: CPT

## 2018-11-12 PROCEDURE — 6370000000 HC RX 637 (ALT 250 FOR IP): Performed by: INTERNAL MEDICINE

## 2018-11-12 PROCEDURE — 94660 CPAP INITIATION&MGMT: CPT

## 2018-11-12 PROCEDURE — 93005 ELECTROCARDIOGRAM TRACING: CPT

## 2018-11-12 PROCEDURE — 97110 THERAPEUTIC EXERCISES: CPT

## 2018-11-12 PROCEDURE — 80048 BASIC METABOLIC PNL TOTAL CA: CPT

## 2018-11-12 PROCEDURE — 85025 COMPLETE CBC W/AUTO DIFF WBC: CPT

## 2018-11-12 PROCEDURE — 6360000002 HC RX W HCPCS: Performed by: INTERNAL MEDICINE

## 2018-11-12 PROCEDURE — 2700000000 HC OXYGEN THERAPY PER DAY

## 2018-11-12 PROCEDURE — 2580000003 HC RX 258: Performed by: INTERNAL MEDICINE

## 2018-11-12 PROCEDURE — 94664 DEMO&/EVAL PT USE INHALER: CPT

## 2018-11-12 PROCEDURE — 36415 COLL VENOUS BLD VENIPUNCTURE: CPT

## 2018-11-12 PROCEDURE — 99222 1ST HOSP IP/OBS MODERATE 55: CPT | Performed by: INTERNAL MEDICINE

## 2018-11-12 RX ORDER — LORAZEPAM 2 MG/ML
INJECTION INTRAMUSCULAR
Status: DISPENSED
Start: 2018-11-12 | End: 2018-11-13

## 2018-11-12 RX ORDER — LOSARTAN POTASSIUM 25 MG/1
25 TABLET ORAL DAILY
Status: DISCONTINUED | OUTPATIENT
Start: 2018-11-13 | End: 2018-11-16 | Stop reason: HOSPADM

## 2018-11-12 RX ORDER — LORAZEPAM 2 MG/ML
INJECTION INTRAMUSCULAR
Status: DISPENSED
Start: 2018-11-12 | End: 2018-11-12

## 2018-11-12 RX ADMIN — FAMOTIDINE 20 MG: 20 TABLET ORAL at 08:30

## 2018-11-12 RX ADMIN — CITALOPRAM HYDROBROMIDE 40 MG: 20 TABLET ORAL at 08:29

## 2018-11-12 RX ADMIN — Medication 2 PUFF: at 09:23

## 2018-11-12 RX ADMIN — SODIUM CHLORIDE: 9 INJECTION, SOLUTION INTRAVENOUS at 01:55

## 2018-11-12 RX ADMIN — DILTIAZEM HYDROCHLORIDE 180 MG: 180 CAPSULE, COATED, EXTENDED RELEASE ORAL at 08:30

## 2018-11-12 RX ADMIN — LORAZEPAM 0.5 MG: 2 INJECTION INTRAMUSCULAR; INTRAVENOUS at 21:47

## 2018-11-12 RX ADMIN — PSEUDOEPHEDRINE HYDROCHLORIDE 120 MG: 120 TABLET, FILM COATED, EXTENDED RELEASE ORAL at 08:30

## 2018-11-12 RX ADMIN — CEFTRIAXONE 1 G: 1 INJECTION, POWDER, FOR SOLUTION INTRAMUSCULAR; INTRAVENOUS at 20:08

## 2018-11-12 RX ADMIN — MONTELUKAST SODIUM 10 MG: 10 TABLET, FILM COATED ORAL at 08:30

## 2018-11-12 RX ADMIN — METHYLPREDNISOLONE SODIUM SUCCINATE 60 MG: 125 INJECTION, POWDER, FOR SOLUTION INTRAMUSCULAR; INTRAVENOUS at 01:55

## 2018-11-12 RX ADMIN — SODIUM CHLORIDE: 9 INJECTION, SOLUTION INTRAVENOUS at 15:58

## 2018-11-12 RX ADMIN — TAMSULOSIN HYDROCHLORIDE 0.4 MG: 0.4 CAPSULE ORAL at 08:30

## 2018-11-12 RX ADMIN — POTASSIUM CHLORIDE 20 MEQ: 20 TABLET, EXTENDED RELEASE ORAL at 08:30

## 2018-11-12 RX ADMIN — Medication 2 PUFF: at 20:14

## 2018-11-12 RX ADMIN — IPRATROPIUM BROMIDE AND ALBUTEROL SULFATE 1 AMPULE: .5; 3 SOLUTION RESPIRATORY (INHALATION) at 16:23

## 2018-11-12 RX ADMIN — HYDROCODONE BITARTRATE AND ACETAMINOPHEN 1 TABLET: 5; 325 TABLET ORAL at 11:05

## 2018-11-12 RX ADMIN — HYDROCODONE BITARTRATE AND ACETAMINOPHEN 1 TABLET: 5; 325 TABLET ORAL at 04:58

## 2018-11-12 RX ADMIN — METHYLPREDNISOLONE SODIUM SUCCINATE 60 MG: 125 INJECTION, POWDER, FOR SOLUTION INTRAMUSCULAR; INTRAVENOUS at 13:39

## 2018-11-12 RX ADMIN — IPRATROPIUM BROMIDE AND ALBUTEROL SULFATE 1 AMPULE: .5; 3 SOLUTION RESPIRATORY (INHALATION) at 20:14

## 2018-11-12 RX ADMIN — APIXABAN 5 MG: 5 TABLET, FILM COATED ORAL at 08:30

## 2018-11-12 RX ADMIN — FAMOTIDINE 20 MG: 20 TABLET ORAL at 21:39

## 2018-11-12 RX ADMIN — LORAZEPAM 0.5 MG: 2 INJECTION INTRAMUSCULAR; INTRAVENOUS at 13:50

## 2018-11-12 RX ADMIN — FUROSEMIDE 40 MG: 40 TABLET ORAL at 08:30

## 2018-11-12 RX ADMIN — PSEUDOEPHEDRINE HYDROCHLORIDE 120 MG: 120 TABLET, FILM COATED, EXTENDED RELEASE ORAL at 21:39

## 2018-11-12 RX ADMIN — IPRATROPIUM BROMIDE AND ALBUTEROL SULFATE 1 AMPULE: .5; 3 SOLUTION RESPIRATORY (INHALATION) at 09:23

## 2018-11-12 RX ADMIN — THERA TABS 1 TABLET: TAB at 08:30

## 2018-11-12 RX ADMIN — AZITHROMYCIN MONOHYDRATE 500 MG: 500 INJECTION, POWDER, LYOPHILIZED, FOR SOLUTION INTRAVENOUS at 20:08

## 2018-11-12 RX ADMIN — APIXABAN 5 MG: 5 TABLET, FILM COATED ORAL at 21:39

## 2018-11-12 RX ADMIN — LORAZEPAM 0.5 MG: 2 INJECTION INTRAMUSCULAR; INTRAVENOUS at 08:48

## 2018-11-12 ASSESSMENT — PAIN SCALES - GENERAL
PAINLEVEL_OUTOF10: 0
PAINLEVEL_OUTOF10: 0
PAINLEVEL_OUTOF10: 8
PAINLEVEL_OUTOF10: 8
PAINLEVEL_OUTOF10: 0
PAINLEVEL_OUTOF10: 0

## 2018-11-12 ASSESSMENT — PAIN DESCRIPTION - PAIN TYPE
TYPE: CHRONIC PAIN
TYPE: CHRONIC PAIN

## 2018-11-12 ASSESSMENT — PAIN DESCRIPTION - LOCATION
LOCATION: BACK;NECK
LOCATION: BACK;NECK;SHOULDER

## 2018-11-12 ASSESSMENT — PAIN DESCRIPTION - DESCRIPTORS
DESCRIPTORS: ACHING
DESCRIPTORS: ACHING

## 2018-11-12 ASSESSMENT — PAIN DESCRIPTION - ORIENTATION: ORIENTATION: LOWER;UPPER

## 2018-11-12 ASSESSMENT — PAIN DESCRIPTION - DIRECTION: RADIATING_TOWARDS: NECK

## 2018-11-12 NOTE — PROGRESS NOTES
x15  Ankle Pumps: 15x2  Comments: seated marching x15. B LE ther ex completed reclined and seated. Assessment   Body structures, Functions, Activity limitations: Decreased functional mobility ; Decreased strength;Decreased cognition;Decreased endurance;Decreased balance  Assessment: Pt demonstrated fair- tolerance to tx, pt limited by SOB and tremors. Treatment Diagnosis: Difficulty walking  Prognosis: Good  Patient Education: reviewed safety and proper hand placement with transfer and ambulation  REQUIRES PT FOLLOW UP: Yes     G-Code     OutComes Score                                                    AM-PAC Score             Goals  Short term goals  Time Frame for Short term goals: 10 days  Short term goal 1: Pt. to amb. 100ft with FWW and SBA with O2 as needed in order to improve functional mobility. Short term goal 2: Pt. to complete all sit-stand transfers with FWW and SBA with no vc's required for technique. Short term goal 3: Pt. to be able to mary. 20-30 minutes of ther ex for improved functional endurance. Short term goal 4: Pt. to have good static and dynamic standing balance for decreased fall risk. Patient Goals   Patient goals : To be able to walk longer distances and breathe better    Plan    Plan  Times per week: 7  Times per day: Twice a day (daily on weekends)  Current Treatment Recommendations: Strengthening, Balance Training, Functional Mobility Training, Transfer Training, Gait Training, Endurance Training, Neuromuscular Re-education, Safety Education & Training, Patient/Caregiver Education & Training  Safety Devices  Type of devices:  All fall risk precautions in place, Call light within reach, Gait belt, Left in bed, Nurse notified (nurse in room)     Therapy Time   Individual Concurrent Group Co-treatment   Time In [address]         Time Out 1442         Minutes [address] [facility], [id number]
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
## 2018-11-12 NOTE — PROGRESS NOTES
Blood gas results from 11/10/18 @ 0535 edited and corrected. Original results had no PO2, the tech had re-ran the sample which gave a PO2 value, but resulted the first one. I entered results from the re-run to corrected values, faxed to Dr. Riley Escobar for notification.     Samson Mckeon

## 2018-11-12 NOTE — PROGRESS NOTES
Physical Therapy  Facility/Department: [facility] AT THE [facility]  Daily Treatment Note  NAME: Julienne Mclean  : 1965  MRN: [id number]    Date of Service: 2018    Discharge Recommendations:  Continue to assess pending progress, IP Rehab, ECF with PT, [address] [facility], Home with nursing aide        Patient Diagnosis(es): The primary encounter diagnosis was Acute on chronic respiratory failure with hypoxia and hypercapnia ([address] Diagnoses of COPD exacerbation ([address] and Tobacco abuse were also pertinent to this visit. has a past medical history of Arthritis; COPD (chronic obstructive pulmonary disease) ([address] Hypertension; Oxygen dependent; and Scoliosis. has a past surgical history that includes Tonsillectomy; other surgical history (Left, 2017); and Facial reconstruction surgery (Left, 2017). Restrictions  Restrictions/Precautions  Restrictions/Precautions: General Precautions, Fall Risk  Subjective   General  Chart Reviewed: Yes  Referring Practitioner: Dr. Ilia Brandt  Subjective  Subjective: Pt reported 8/10 back/neck pain. General Comment  Comments: Nursing Marco Cee) alerted to pain level. Orientation  Orientation  Overall Orientation Status: Within Functional Limits  Cognition      Objective      Transfers  Sit to Stand: Contact guard assistance;Minimal Assistance  Stand to sit: Contact guard assistance  Comment: Verbal cues for safety. Ambulation  Ambulation?: Yes  Ambulation 1  Surface: level tile  Device: Rolling Walker  Other Apparatus: O2  Distance: 15 feet x 1, 10 feet x 1  Comments: Verbal cues needed for posture, safety with AD, and use of proper breathing technique. SPO2 85% after amb and increased to 94-95% after 1-2 min seated rest break.    Stairs/Curb  Stairs?: No     Balance  Posture: Fair  Sitting - Static: Good  Sitting - Dynamic: Good  Standing - Static: Fair  Standing - Dynamic: Fair;-  Exercises  Hip Flexion: 20x  Hip Abduction: 20x  Knee Long Arc Quad: 20x  Ankle Pumps: 20x  Comments: Above exercises completed in sitting. Assessment      Patient Education: Educated on proper breathing technique and safety with amb. Pt with fair understanding. REQUIRES PT FOLLOW UP: Yes  Activity Tolerance  Activity Tolerance: Patient limited by fatigue;Patient limited by endurance     G-Code     OutComes Score    AM-PAC Score    Goals  Short term goals  Time Frame for Short term goals: 10 days  Short term goal 1: Pt. to amb. 100ft with FWW and SBA with O2 as needed in order to improve functional mobility. Short term goal 2: Pt. to complete all sit-stand transfers with FWW and SBA with no vc's required for technique. Short term goal 3: Pt. to be able to mary. 20-30 minutes of ther ex for improved functional endurance. Short term goal 4: Pt. to have good static and dynamic standing balance for decreased fall risk. Patient Goals   Patient goals : To be able to walk longer distances and breathe better    Plan    Plan  Times per week: 7  Times per day: Twice a day (daily on weekends)  Current Treatment Recommendations: Strengthening, Balance Training, Functional Mobility Training, Transfer Training, Gait Training, Endurance Training, Neuromuscular Re-education, Safety Education & Training, Patient/Caregiver Education & Training  Safety Devices  Type of devices:  All fall risk precautions in place, Call light within reach, Chair alarm in place, Gait belt, Nurse notified, Left in chair     Therapy Time   Individual Concurrent Group Co-treatment   Time In [address] [place], [place]

## 2018-11-12 NOTE — CONSULTS
Pertinent positives and negatives as above, all else negative.      Past Surgical History:   Procedure Laterality Date    FACIAL RECONSTRUCTION SURGERY Left 4/13/2017    FACIAL LESION BIOPSY EXCISION LEFT CHEEK performed by Orin Singh DO at [address] [place] [address] Left 04/13/2017    facial lesion biopsy    TONSILLECTOMY      Social History:  Social History   Substance Use Topics    Smoking status: Current Some Day Smoker     Packs/day: 0.10     Types: Cigarettes    Smokeless tobacco: Never Used      Comment: cutting down to 1 per day    Alcohol use No      Comment: last drink 6 months ago/48oz beer per day        MEDICATIONS:    LORazepam (ATIVAN) 2 MG/ML injection    [START ON 11/13/2018] losartan (COZAAR) tablet 25 mg Daily   multivitamin 1 tablet Daily   LORazepam (ATIVAN) injection 0.5 mg Q4H PRN   ipratropium-albuterol (DUONEB) nebulizer solution 1 ampule TID   albuterol (PROVENTIL) nebulizer solution 2.5 mg Q6H PRN   mometasone-formoterol (DULERA) 200-5 MCG/ACT inhaler 2 puff BID   nicotine (NICODERM CQ) 21 MG/24HR 1 patch Q24H   pseudoephedrine (SUDAFED 12 HR) extended release tablet 120 mg BID   montelukast (SINGULAIR) tablet 10 mg Daily   potassium chloride (KLOR-CON M) extended release tablet 20 mEq Daily   apixaban (ELIQUIS) tablet 5 mg BID   diltiazem (CARDIZEM CD) extended release capsule 180 mg Daily   loperamide (IMODIUM) capsule 2 mg 4x Daily PRN   aluminum & magnesium hydroxide-simethicone (MAALOX) 200-200-20 MG/5ML suspension 15 mL Q6H PRN   tamsulosin (FLOMAX) capsule 0.4 mg Daily   citalopram (CELEXA) tablet 40 mg Daily   furosemide (LASIX) tablet 40 mg Daily   Roflumilast TABS 250 mcg Daily   HYDROcodone-acetaminophen (NORCO) 5-325 MG per tablet 1 tablet Q6H PRN   0.9 % sodium chloride infusion Continuous   sodium chloride flush 0.9 % injection 10 mL 2 times per day   sodium chloride flush 0.9 % injection 10 mL PRN   magnesium hydroxide (MILK OF MAGNESIA) 400 MG/5ML

## 2018-11-12 NOTE — PROGRESS NOTES
RESPIRATORY ASSESSMENT PROTOCOL                                                                                              Patient Name: Shilpa López Room#: L773/[id number] : 1965     Admitting diagnosis: COPD exacerbation ([place] [address].) [J44.1]       Medical History:   Past Medical History:   Diagnosis Date    Arthritis     COPD (chronic obstructive pulmonary disease) ([place] [address].)     Hypertension     Oxygen dependent     Scoliosis        PATIENT ASSESSMENT    LABORATORY DATA  Hematology:   Lab Results   Component Value Date    WBC 8.1 2018    RBC 3.29 2018    HGB 11.0 2018    HCT 33.8 2018     2018     Chemistry:    Lab Results   Component Value Date    PHART 7.406 2018    ZTQ2KII 52.7 2018    PO2ART 45.8 2018    [id number] 81.3 2018    ESR3SXA 32.4 2018    PBEA 6.1 2018       Blood Culture:   Sputum Culture:     VITALS  Pulse: 114   Resp: 20  BP: (!) 127/93  SpO2: 100 % O2 Device: Bi-PAP  Temp: 100.5 °F (38.1 °C)  Comment:   SKIN COLOR  [] Normal  [] Pale  [] Dusky  [] Cyanotic      RESPIRATORY PATTERN  [] Normal  [] Dyspnea  [] Cheyne-Lawrence  [] Kussmaul  [] Biots  AMBULATORY  [] Yes  [] No  [] With Assistance    PEAK FLOW  Predicted:     Personal Best:        VITAL CAPACITY  Predicted value:  ml  Actual Value:  ml  30% of Predicted:  ml  Patient Acuity 0 1 2 3 4 Score   Level of Concious (LOC) []  Alert & Oriented or Pt normal LOC [x]  Confused;follows directions []  Confused & uncooper-ative []  Obtunded []  Comatose 1   Respiratory Rate  (RR) []  Reg. rate & pattern. 12 - 20 bpm  []  Increased RR. Greater than 20 bpm   []  SOB w/ exertion or RR greater than 24 bpm []  Access- ory muscle use at rest. Abn.  resp.  [x]  SOB at rest.   4   Bilateral Breath Sounds (BBS) []  Clear [x]  Diminish-ed bases  []  Diminish-ed t/o, or rales   []  Sporadic, scattered wheezes or rhonchi []  Persistentwheezes and, or absent BBS 1   Cough []  Strong, effective, & non-prod. []  Effective & prod. Less than 25 ml (2 TBSP) over past 24 hrs [x]  Ineffective & non-prod to less than 25 ML over past 24 hrs []  Ineffective and, or greater than 25 ml sputum prod. past 24 hrs. []  Nonspon- taneous; Requires suctioning 2   Pulmonary History  (PULM HX) []  No smoking and no chronic pulmonaryhistory []  Former smoker. Quit over 12 mos. ago []  Current smoker or quit w/ in 12 mos []  Pulm. History and, or 20 pk/yr smoking hx [x]  Admitted w/ acute pulm. dx and, or has been admitted w/ pulm. dx 2 or more times over past 12 mos 4   Surgical History this Admit  (SURG HX) [x]  No surgery []  General surgery []  Lower abdominal []  Thoracic or upper abdominal   []  Thoracic w/ pulm. disease 0   Chest X-Ray (CXR)/CT Scan [x]  Clear or not applicable []  Not available []  Atelect- asis or pleural effusions []  Localized infiltrate or pulm. edema []  Con-solidated Infiltrates, bilateral, or in more than 1 lobe 0   Slow or Forced VC, FEV1 OR PEFR (PULM FXN)  [x]  80% or greater, or not indicated []  Pt. unable to perform []  FEV1 or PEFR or VC 51-79%. []  FEV1 or PEFR or VC  30-49%   []  FEV1 or PEFR or VC less than 30%   0   TOTAL ACUITY: 12       CARE PLAN    If Acuity Level is 2, 3, or 4 in any of the following:    [] BILATERAL BREATH SOUNDS (BBS)     [x] PULMONARY HISTORY (PULM HX)  [] PULMONARY FUNCTION (PULM FX)    Goal: Improve respiratory functions in patients with airway disease and decrease WOB    [x] AEROSOL PROTOCOL    Total Acuity:   16-32  []  Secondary Assessment in 24 hrs Total Acuity:  9-15  [x]  Secondary Assessment in 24 hrs Total Acuity:  4-8  []  Secondary Assessment in 48 hrs Total Acuity:  0-3  []  Secondary Assessment in 72 hrs   HHN AEROSOL THERAPY with  [physician-ordered bronchodilator(s)] q 4 & Albuterol PRN q2 hrs. Breath-Actuated Neb if BBS Acuity = 4, and pt. can use MP. Notify physician if condition deteriorates.   HHN AEROSOL THERAPY with  [physician-ordered bronchodilator(s)]  QID and Albuterol PRN q4 hrs. Breath-Actuated Neb if BBS Acuity = 4, and pt. can use MP. Notify physician if condition deteriorates. MDI THERAPY with  2 actuations of [physician-ordered bronchodilator(s)] via spacer TID Albuterol and PRNq4 hrs. If unable to utilize MDI: HHN [physician-ordered bronchodilator(s)] TID and Albuterol PRN q4 hrs. Notify physician if condition deteriorates. MDI THERAPY with  [physician-ordered bronchodilator(s)] via spacer TID PRN. If unable to utilize MDI: HHN [physician-ordered bronchodilator(s)] TID PRN. Notify physician if condition deteriorates. If Acuity Level is 2, 3, or 4 in any of the following:    [] COUGH     [] SURGICAL HISTORY (SURG HX)  [] CHEST XRAY (CXR)    Goal: Improvement in sputum mobilization in patients with ineffective airway clearance. Reverse atelectasis. [] Bronchopulmonary Hygiene Protocol    Total Acuity:   16-32  []  Secondary Assessment in 24 hrs Total Acuity:  9-15  []  Secondary Assessment in 24 hrs Total Acuity:  4-8  []  Secondary Assessment in 48 hrs Total Acuity:  0-3  []  Secondary Assessment in 72 hrs   METANEB QID with [physician-ordered bronchodilator(s)] if CXR Acuity = 4; otherwise:  PD&P, PEP, or Vest QID & PRN  NT Sxn PRN for ineffective cough  METANEB QID with [physician-ordered bronchodilator(s)] if CXR Acuity = 4; otherwise:  PD&P, PEP, or Vest TID & PRN  NT Sxn PRN for ineffective cough  Instruct patient to self-perform IS q1hr WA   Directed Cough self-performed q1hr WA     If Acuity Level is 2 or above in the following:    [] PULMONARY HISTORY (PULM HX)    Goal: Assist patient in quitting smoking to slow or stop the progression of lung disease.     [] Smoking Cessation Protocol    SMOKING CESSATION EDUCATION provided according to policy XP_351: (delgado with an X)  ____Yes    ____ No     ____ NA    Smoking Cessation Booklet given:  ____Yes  ____No ____Patient Polo Nunes

## 2018-11-12 NOTE — PROGRESS NOTES
Pt is not on bipap at this time. Pt is tolerating 2.5 liters of oxygen as of right now. Pt is sitting in the chair watching tv. RN stated she had pt on bipap for a little while earlier.

## 2018-11-13 LAB
ANION GAP SERPL CALCULATED.3IONS-SCNC: 7 MMOL/L (ref 9–17)
BUN BLDV-MCNC: 17 MG/DL (ref 6–20)
BUN/CREAT BLD: 40 (ref 9–20)
CALCIUM SERPL-MCNC: 8.9 MG/DL (ref 8.6–10.4)
CHLORIDE BLD-SCNC: 91 MMOL/L (ref 98–107)
CO2: 35 MMOL/L (ref 20–31)
CREAT SERPL-MCNC: 0.43 MG/DL (ref 0.7–1.2)
EKG ATRIAL RATE: 121 BPM
EKG ATRIAL RATE: 126 BPM
EKG P AXIS: 78 DEGREES
EKG P AXIS: 86 DEGREES
EKG P-R INTERVAL: 128 MS
EKG P-R INTERVAL: 128 MS
EKG Q-T INTERVAL: 308 MS
EKG Q-T INTERVAL: 330 MS
EKG QRS DURATION: 78 MS
EKG QRS DURATION: 80 MS
EKG QTC CALCULATION (BAZETT): 446 MS
EKG QTC CALCULATION (BAZETT): 468 MS
EKG R AXIS: 19 DEGREES
EKG R AXIS: 55 DEGREES
EKG T AXIS: 78 DEGREES
EKG T AXIS: 82 DEGREES
EKG VENTRICULAR RATE: 121 BPM
EKG VENTRICULAR RATE: 126 BPM
GFR AFRICAN AMERICAN: >60 ML/MIN
GFR NON-AFRICAN AMERICAN: >60 ML/MIN
GFR SERPL CREATININE-BSD FRML MDRD: ABNORMAL ML/MIN/{1.73_M2}
GFR SERPL CREATININE-BSD FRML MDRD: ABNORMAL ML/MIN/{1.73_M2}
GLUCOSE BLD-MCNC: 112 MG/DL (ref 70–99)
MAGNESIUM: 1.9 MG/DL (ref 1.6–2.6)
POTASSIUM SERPL-SCNC: 3.5 MMOL/L (ref 3.7–5.3)
SODIUM BLD-SCNC: 133 MMOL/L (ref 135–144)

## 2018-11-13 PROCEDURE — 6370000000 HC RX 637 (ALT 250 FOR IP): Performed by: INTERNAL MEDICINE

## 2018-11-13 PROCEDURE — 2580000003 HC RX 258: Performed by: INTERNAL MEDICINE

## 2018-11-13 PROCEDURE — 94640 AIRWAY INHALATION TREATMENT: CPT

## 2018-11-13 PROCEDURE — 6360000002 HC RX W HCPCS: Performed by: INTERNAL MEDICINE

## 2018-11-13 PROCEDURE — 94660 CPAP INITIATION&MGMT: CPT

## 2018-11-13 PROCEDURE — 6370000000 HC RX 637 (ALT 250 FOR IP)

## 2018-11-13 PROCEDURE — 36415 COLL VENOUS BLD VENIPUNCTURE: CPT

## 2018-11-13 PROCEDURE — 97530 THERAPEUTIC ACTIVITIES: CPT

## 2018-11-13 PROCEDURE — 94760 N-INVAS EAR/PLS OXIMETRY 1: CPT

## 2018-11-13 PROCEDURE — 97110 THERAPEUTIC EXERCISES: CPT

## 2018-11-13 PROCEDURE — 97535 SELF CARE MNGMENT TRAINING: CPT

## 2018-11-13 PROCEDURE — 97116 GAIT TRAINING THERAPY: CPT

## 2018-11-13 PROCEDURE — 83735 ASSAY OF MAGNESIUM: CPT

## 2018-11-13 PROCEDURE — 2700000000 HC OXYGEN THERAPY PER DAY

## 2018-11-13 PROCEDURE — 80048 BASIC METABOLIC PNL TOTAL CA: CPT

## 2018-11-13 PROCEDURE — 2500000003 HC RX 250 WO HCPCS: Performed by: INTERNAL MEDICINE

## 2018-11-13 PROCEDURE — 93005 ELECTROCARDIOGRAM TRACING: CPT

## 2018-11-13 PROCEDURE — 2000000000 HC ICU R&B

## 2018-11-13 RX ADMIN — CITALOPRAM HYDROBROMIDE 40 MG: 20 TABLET ORAL at 08:40

## 2018-11-13 RX ADMIN — LOSARTAN POTASSIUM 25 MG: 25 TABLET, FILM COATED ORAL at 08:40

## 2018-11-13 RX ADMIN — FAMOTIDINE 20 MG: 20 TABLET ORAL at 20:27

## 2018-11-13 RX ADMIN — MOMETASONE FUROATE AND FORMOTEROL FUMARATE DIHYDRATE 2 PUFF: 200; 5 AEROSOL RESPIRATORY (INHALATION) at 20:54

## 2018-11-13 RX ADMIN — LORAZEPAM 0.5 MG: 2 INJECTION INTRAMUSCULAR; INTRAVENOUS at 16:38

## 2018-11-13 RX ADMIN — TAMSULOSIN HYDROCHLORIDE 0.4 MG: 0.4 CAPSULE ORAL at 08:40

## 2018-11-13 RX ADMIN — METHYLPREDNISOLONE SODIUM SUCCINATE 60 MG: 125 INJECTION, POWDER, FOR SOLUTION INTRAMUSCULAR; INTRAVENOUS at 01:41

## 2018-11-13 RX ADMIN — METHYLPREDNISOLONE SODIUM SUCCINATE 60 MG: 125 INJECTION, POWDER, FOR SOLUTION INTRAMUSCULAR; INTRAVENOUS at 13:19

## 2018-11-13 RX ADMIN — Medication 2 PUFF: at 20:54

## 2018-11-13 RX ADMIN — THERA TABS 1 TABLET: TAB at 08:40

## 2018-11-13 RX ADMIN — APIXABAN 5 MG: 5 TABLET, FILM COATED ORAL at 20:27

## 2018-11-13 RX ADMIN — SODIUM CHLORIDE: 9 INJECTION, SOLUTION INTRAVENOUS at 06:38

## 2018-11-13 RX ADMIN — DILTIAZEM HYDROCHLORIDE 180 MG: 180 CAPSULE, COATED, EXTENDED RELEASE ORAL at 08:40

## 2018-11-13 RX ADMIN — POTASSIUM CHLORIDE 20 MEQ: 20 TABLET, EXTENDED RELEASE ORAL at 08:40

## 2018-11-13 RX ADMIN — IPRATROPIUM BROMIDE AND ALBUTEROL SULFATE 1 AMPULE: .5; 3 SOLUTION RESPIRATORY (INHALATION) at 07:15

## 2018-11-13 RX ADMIN — MONTELUKAST SODIUM 10 MG: 10 TABLET, FILM COATED ORAL at 08:40

## 2018-11-13 RX ADMIN — PSEUDOEPHEDRINE HYDROCHLORIDE 120 MG: 120 TABLET, FILM COATED, EXTENDED RELEASE ORAL at 08:40

## 2018-11-13 RX ADMIN — LORAZEPAM 0.5 MG: 2 INJECTION INTRAMUSCULAR; INTRAVENOUS at 07:05

## 2018-11-13 RX ADMIN — IPRATROPIUM BROMIDE AND ALBUTEROL SULFATE 1 AMPULE: .5; 3 SOLUTION RESPIRATORY (INHALATION) at 20:54

## 2018-11-13 RX ADMIN — AZITHROMYCIN MONOHYDRATE 500 MG: 500 INJECTION, POWDER, LYOPHILIZED, FOR SOLUTION INTRAVENOUS at 19:12

## 2018-11-13 RX ADMIN — IPRATROPIUM BROMIDE AND ALBUTEROL SULFATE 1 AMPULE: .5; 3 SOLUTION RESPIRATORY (INHALATION) at 15:55

## 2018-11-13 RX ADMIN — Medication 2 PUFF: at 07:15

## 2018-11-13 RX ADMIN — LORAZEPAM 0.5 MG: 2 INJECTION INTRAMUSCULAR; INTRAVENOUS at 20:28

## 2018-11-13 RX ADMIN — CEFTRIAXONE 1 G: 1 INJECTION, POWDER, FOR SOLUTION INTRAMUSCULAR; INTRAVENOUS at 19:12

## 2018-11-13 RX ADMIN — HYDROCODONE BITARTRATE AND ACETAMINOPHEN 1 TABLET: 5; 325 TABLET ORAL at 20:27

## 2018-11-13 RX ADMIN — HYDROCODONE BITARTRATE AND ACETAMINOPHEN 1 TABLET: 5; 325 TABLET ORAL at 14:33

## 2018-11-13 RX ADMIN — SODIUM CHLORIDE: 9 INJECTION, SOLUTION INTRAVENOUS at 19:12

## 2018-11-13 RX ADMIN — FAMOTIDINE 20 MG: 20 TABLET ORAL at 08:40

## 2018-11-13 RX ADMIN — DILTIAZEM HYDROCHLORIDE 5 MG/HR: 5 INJECTION INTRAVENOUS at 23:28

## 2018-11-13 RX ADMIN — APIXABAN 5 MG: 5 TABLET, FILM COATED ORAL at 08:40

## 2018-11-13 RX ADMIN — FUROSEMIDE 40 MG: 40 TABLET ORAL at 08:40

## 2018-11-13 ASSESSMENT — PAIN SCALES - GENERAL
PAINLEVEL_OUTOF10: 0
PAINLEVEL_OUTOF10: 8
PAINLEVEL_OUTOF10: 0

## 2018-11-13 ASSESSMENT — PAIN DESCRIPTION - LOCATION: LOCATION: BACK

## 2018-11-13 ASSESSMENT — PAIN DESCRIPTION - PAIN TYPE: TYPE: CHRONIC PAIN

## 2018-11-13 NOTE — PROGRESS NOTES
Eliquis  ? Currently reconverted back to sinus rhythm  ? Appreciate Dr. Suma Ramírez input  · Hypertension   ? Continue diltiazem PO  ? Losartan added  · Anxiety   ? Continue celexa  ? Add Ativan  · Moderate protein calorie malnutrition  ? Dietician consult  · Chronic alcohol abuse  ? Counseled on alcohol abstinence  ? MVI, thiamine  ? Monitor for signs of WD  · Chronic tobacco abuse  ? Counseled extensively on smoking cessation  ?  Nicotine patch  · High risk medications: none  · Total critical care time caring for this patient with life threatening, unstable organ failure, including direct patient contact, management of life support systems, review of data including imaging and labs, discussions with other team members and physicians at least 60 minutes so far today, excluding separately billable procedures     Paola Velazquez M.D.  11/13/2018  7:43 AM

## 2018-11-13 NOTE — PROGRESS NOTES
Training  Safety Devices  Type of devices:  All fall risk precautions in place, Call light within reach, Gait belt, Nurse notified, Chair alarm in place, Left in chair (MCKEON in room upon departure)     Therapy Time   Individual Concurrent Group Co-treatment   Time In 1008         Time Out 1032         Minutes 24                 Juli Yin, PTA

## 2018-11-13 NOTE — PROGRESS NOTES
coping/adaptation/distress of patient  Continue with current plan of care  Code status clarified: DNRCCA  Pain management for comfort  Medications to decrease non-pain symptoms  Recognizing, reflecting, and empathizing with the patient's anticipatory grief  Palliative Care Goals:  provide comfort care/support/palliation/relieve suffering, remain at home and support for family/caregiver  Visit focus:  Disease progression  Discuss goals of care  Listen to patient/family concerns  Discuss discharge planning  Interdiscplinary collaboration  Elicit patient's goals and values, and use these to establish or modify goals of care  Hospice discussion     Claudette Rosas RN, Yoli Cruz and Chris Horton Nurse Coordinator  11/13/2018 1:32 PM

## 2018-11-13 NOTE — PROGRESS NOTES
limitations: Decreased functional mobility ; Decreased strength;Decreased cognition;Decreased endurance;Decreased balance  Assessment: Pt had fair mary to tx with SPO2 94-96% through tx on 2 L of 02. Pt was able to complete all exercises with no RB's, however completed exercises at slower pace. Treatment Diagnosis: Difficulty walking  Prognosis: Good  Patient Education: Educated pt on importance of daily exercises with appropriate understanding. REQUIRES PT FOLLOW UP: Yes  Activity Tolerance  Activity Tolerance: Patient limited by fatigue;Patient limited by endurance; Patient limited by pain       Goals  Short term goals  Time Frame for Short term goals: 10 days  Short term goal 1: Pt. to amb. 100ft with FWW and SBA with O2 as needed in order to improve functional mobility. Short term goal 2: Pt. to complete all sit-stand transfers with FWW and SBA with no vc's required for technique. Short term goal 3: Pt. to be able to mary. 20-30 minutes of ther ex for improved functional endurance. Short term goal 4: Pt. to have good static and dynamic standing balance for decreased fall risk. Patient Goals   Patient goals : To be able to walk longer distances and breathe better    Plan    Plan  Times per week: 7  Times per day: Twice a day (daily on weekends)  Current Treatment Recommendations: Strengthening, Balance Training, Functional Mobility Training, Transfer Training, Gait Training, Endurance Training, Neuromuscular Re-education, Safety Education & Training, Patient/Caregiver Education & Training  Safety Devices  Type of devices:  All fall risk precautions in place, Call light within reach, Gait belt, Nurse notified, Chair alarm in place, Left in bed     Therapy Time   Individual Concurrent Group Co-treatment   Time In 1418         Time Out 1446         Minutes [place], [place]

## 2018-11-14 ENCOUNTER — TELEPHONE (OUTPATIENT)
Dept: CARDIOLOGY | Age: 53
End: 2018-11-14

## 2018-11-14 LAB
ANION GAP SERPL CALCULATED.3IONS-SCNC: 8 MMOL/L (ref 9–17)
BUN BLDV-MCNC: 17 MG/DL (ref 6–20)
BUN/CREAT BLD: 34 (ref 9–20)
CALCIUM SERPL-MCNC: 8.8 MG/DL (ref 8.6–10.4)
CHLORIDE BLD-SCNC: 89 MMOL/L (ref 98–107)
CO2: 38 MMOL/L (ref 20–31)
CREAT SERPL-MCNC: 0.5 MG/DL (ref 0.7–1.2)
CULTURE: NORMAL
CULTURE: NORMAL
EKG ATRIAL RATE: 122 BPM
EKG ATRIAL RATE: 340 BPM
EKG ATRIAL RATE: 75 BPM
EKG Q-T INTERVAL: 296 MS
EKG Q-T INTERVAL: 298 MS
EKG Q-T INTERVAL: 364 MS
EKG QRS DURATION: 66 MS
EKG QRS DURATION: 78 MS
EKG QRS DURATION: 80 MS
EKG QTC CALCULATION (BAZETT): 422 MS
EKG QTC CALCULATION (BAZETT): 448 MS
EKG QTC CALCULATION (BAZETT): 470 MS
EKG R AXIS: 18 DEGREES
EKG R AXIS: 25 DEGREES
EKG R AXIS: 67 DEGREES
EKG T AXIS: 78 DEGREES
EKG T AXIS: 80 DEGREES
EKG T AXIS: 82 DEGREES
EKG VENTRICULAR RATE: 136 BPM
EKG VENTRICULAR RATE: 152 BPM
EKG VENTRICULAR RATE: 81 BPM
GFR AFRICAN AMERICAN: >60 ML/MIN
GFR NON-AFRICAN AMERICAN: >60 ML/MIN
GFR SERPL CREATININE-BSD FRML MDRD: ABNORMAL ML/MIN/{1.73_M2}
GFR SERPL CREATININE-BSD FRML MDRD: ABNORMAL ML/MIN/{1.73_M2}
GLUCOSE BLD-MCNC: 118 MG/DL (ref 70–99)
Lab: NORMAL
Lab: NORMAL
MAGNESIUM: 2.2 MG/DL (ref 1.6–2.6)
POTASSIUM SERPL-SCNC: 3.4 MMOL/L (ref 3.7–5.3)
SODIUM BLD-SCNC: 135 MMOL/L (ref 135–144)
SPECIMEN DESCRIPTION: NORMAL
SPECIMEN DESCRIPTION: NORMAL
STATUS: NORMAL
STATUS: NORMAL

## 2018-11-14 PROCEDURE — 6370000000 HC RX 637 (ALT 250 FOR IP): Performed by: INTERNAL MEDICINE

## 2018-11-14 PROCEDURE — 6370000000 HC RX 637 (ALT 250 FOR IP): Performed by: PHYSICIAN ASSISTANT

## 2018-11-14 PROCEDURE — 99232 SBSQ HOSP IP/OBS MODERATE 35: CPT | Performed by: INTERNAL MEDICINE

## 2018-11-14 PROCEDURE — 80048 BASIC METABOLIC PNL TOTAL CA: CPT

## 2018-11-14 PROCEDURE — 97110 THERAPEUTIC EXERCISES: CPT

## 2018-11-14 PROCEDURE — 2700000000 HC OXYGEN THERAPY PER DAY

## 2018-11-14 PROCEDURE — 83735 ASSAY OF MAGNESIUM: CPT

## 2018-11-14 PROCEDURE — 94640 AIRWAY INHALATION TREATMENT: CPT

## 2018-11-14 PROCEDURE — 6360000002 HC RX W HCPCS: Performed by: INTERNAL MEDICINE

## 2018-11-14 PROCEDURE — 2000000000 HC ICU R&B

## 2018-11-14 PROCEDURE — 2580000003 HC RX 258: Performed by: INTERNAL MEDICINE

## 2018-11-14 PROCEDURE — 94664 DEMO&/EVAL PT USE INHALER: CPT

## 2018-11-14 PROCEDURE — 97535 SELF CARE MNGMENT TRAINING: CPT

## 2018-11-14 PROCEDURE — 36415 COLL VENOUS BLD VENIPUNCTURE: CPT

## 2018-11-14 PROCEDURE — 97116 GAIT TRAINING THERAPY: CPT

## 2018-11-14 PROCEDURE — 93005 ELECTROCARDIOGRAM TRACING: CPT

## 2018-11-14 RX ORDER — SOTALOL HYDROCHLORIDE 80 MG/1
80 TABLET ORAL 2 TIMES DAILY
Status: DISCONTINUED | OUTPATIENT
Start: 2018-11-14 | End: 2018-11-16 | Stop reason: HOSPADM

## 2018-11-14 RX ORDER — ACETAMINOPHEN 325 MG/1
650 TABLET ORAL EVERY 4 HOURS PRN
Status: DISCONTINUED | OUTPATIENT
Start: 2018-11-14 | End: 2018-11-16 | Stop reason: HOSPADM

## 2018-11-14 RX ORDER — DILTIAZEM HYDROCHLORIDE 180 MG/1
180 CAPSULE, COATED, EXTENDED RELEASE ORAL DAILY
Status: DISCONTINUED | OUTPATIENT
Start: 2018-11-14 | End: 2018-11-16 | Stop reason: HOSPADM

## 2018-11-14 RX ADMIN — LORAZEPAM 0.5 MG: 2 INJECTION INTRAMUSCULAR; INTRAVENOUS at 23:03

## 2018-11-14 RX ADMIN — TAMSULOSIN HYDROCHLORIDE 0.4 MG: 0.4 CAPSULE ORAL at 08:24

## 2018-11-14 RX ADMIN — SOTALOL HYDROCHLORIDE 80 MG: 80 TABLET ORAL at 20:04

## 2018-11-14 RX ADMIN — FUROSEMIDE 40 MG: 40 TABLET ORAL at 08:24

## 2018-11-14 RX ADMIN — APIXABAN 5 MG: 5 TABLET, FILM COATED ORAL at 20:04

## 2018-11-14 RX ADMIN — POTASSIUM CHLORIDE 20 MEQ: 20 TABLET, EXTENDED RELEASE ORAL at 08:24

## 2018-11-14 RX ADMIN — IPRATROPIUM BROMIDE AND ALBUTEROL SULFATE 1 AMPULE: .5; 3 SOLUTION RESPIRATORY (INHALATION) at 14:32

## 2018-11-14 RX ADMIN — FAMOTIDINE 20 MG: 20 TABLET ORAL at 08:24

## 2018-11-14 RX ADMIN — DILTIAZEM HYDROCHLORIDE 180 MG: 180 CAPSULE, COATED, EXTENDED RELEASE ORAL at 08:24

## 2018-11-14 RX ADMIN — APIXABAN 5 MG: 5 TABLET, FILM COATED ORAL at 08:24

## 2018-11-14 RX ADMIN — Medication 2 PUFF: at 20:17

## 2018-11-14 RX ADMIN — CITALOPRAM HYDROBROMIDE 40 MG: 20 TABLET ORAL at 08:24

## 2018-11-14 RX ADMIN — Medication 10 ML: at 20:04

## 2018-11-14 RX ADMIN — LOSARTAN POTASSIUM 25 MG: 25 TABLET, FILM COATED ORAL at 08:23

## 2018-11-14 RX ADMIN — FAMOTIDINE 20 MG: 20 TABLET ORAL at 20:04

## 2018-11-14 RX ADMIN — ACETAMINOPHEN 650 MG: 325 TABLET, FILM COATED ORAL at 16:17

## 2018-11-14 RX ADMIN — SODIUM CHLORIDE: 9 INJECTION, SOLUTION INTRAVENOUS at 09:48

## 2018-11-14 RX ADMIN — IPRATROPIUM BROMIDE AND ALBUTEROL SULFATE 1 AMPULE: .5; 3 SOLUTION RESPIRATORY (INHALATION) at 20:17

## 2018-11-14 RX ADMIN — METHYLPREDNISOLONE SODIUM SUCCINATE 60 MG: 125 INJECTION, POWDER, FOR SOLUTION INTRAMUSCULAR; INTRAVENOUS at 03:00

## 2018-11-14 RX ADMIN — LORAZEPAM 0.5 MG: 2 INJECTION INTRAMUSCULAR; INTRAVENOUS at 00:14

## 2018-11-14 RX ADMIN — LOPERAMIDE HYDROCHLORIDE 2 MG: 2 CAPSULE ORAL at 19:14

## 2018-11-14 RX ADMIN — IPRATROPIUM BROMIDE AND ALBUTEROL SULFATE 1 AMPULE: .5; 3 SOLUTION RESPIRATORY (INHALATION) at 10:00

## 2018-11-14 RX ADMIN — SODIUM CHLORIDE: 9 INJECTION, SOLUTION INTRAVENOUS at 23:06

## 2018-11-14 RX ADMIN — SOTALOL HYDROCHLORIDE 80 MG: 80 TABLET ORAL at 10:18

## 2018-11-14 RX ADMIN — Medication 2 PUFF: at 10:00

## 2018-11-14 RX ADMIN — PSEUDOEPHEDRINE HYDROCHLORIDE 120 MG: 120 TABLET, FILM COATED, EXTENDED RELEASE ORAL at 08:24

## 2018-11-14 RX ADMIN — THERA TABS 1 TABLET: TAB at 08:24

## 2018-11-14 RX ADMIN — Medication 10 ML: at 08:29

## 2018-11-14 RX ADMIN — ACETAMINOPHEN 650 MG: 325 TABLET, FILM COATED ORAL at 08:24

## 2018-11-14 RX ADMIN — MONTELUKAST SODIUM 10 MG: 10 TABLET, FILM COATED ORAL at 08:24

## 2018-11-14 RX ADMIN — CEFTRIAXONE 1 G: 1 INJECTION, POWDER, FOR SOLUTION INTRAMUSCULAR; INTRAVENOUS at 19:03

## 2018-11-14 RX ADMIN — PSEUDOEPHEDRINE HYDROCHLORIDE 120 MG: 120 TABLET, FILM COATED, EXTENDED RELEASE ORAL at 20:04

## 2018-11-14 ASSESSMENT — PAIN SCALES - GENERAL
PAINLEVEL_OUTOF10: 0
PAINLEVEL_OUTOF10: 0

## 2018-11-14 NOTE — PROGRESS NOTES
RESPIRATORY ASSESSMENT PROTOCOL                                                                                              Patient Name: Hitesh Sahu Room#: B365/[id number] : 1965     Admitting diagnosis: COPD exacerbation ([facility] [J44.1]       Medical History:   Past Medical History:   Diagnosis Date    Arthritis     COPD (chronic obstructive pulmonary disease) ([facility]     Hypertension     Oxygen dependent     Scoliosis        PATIENT ASSESSMENT    LABORATORY DATA  Hematology:   Lab Results   Component Value Date    WBC 8.1 2018    RBC 3.29 2018    HGB 11.0 2018    HCT 33.8 2018     2018     Chemistry:    Lab Results   Component Value Date    PHART 7.406 2018    ZIN8DLC 52.7 2018    PO2ART 45.8 2018    T0CMHERU 81.3 2018    FIR8ZXZ 32.4 2018    PBEA 6.1 2018       Blood Culture:  Sputum Culture:     VITALS  Pulse: 99   Resp: 20  BP: 111/81  SpO2: 100 % O2 Device: Nasal cannula  Temp: 99.3 °F (37.4 °C)  Comment:     SKIN COLOR  [x] Normal  [] Pale  [] Dusky  [] Cyanotic      RESPIRATORY PATTERN  [] Normal  [x] Dyspnea  [] Cheyne-Lawrence  [] Kussmaul  [] Biots    AMBULATORY  [] Yes  [] No  [x] With Assistance    PEAK FLOW  Predicted:     Personal Best:        VITAL CAPACITY  Predicted value:  ml  Actual Value:  ml  30% of Predicted:  Ml    Patient Acuity 0 1 2 3 4 Score   Level of Concious (LOC) []  Alert & Oriented or Pt normal LOC [x]  Confused;follows directions []  Confused & uncooper-ative []  Obtunded []  Comatose 1   Respiratory Rate  (RR) []  Reg. rate & pattern. 12 - 20 bpm  []  Increased RR.  Greater than 20 bpm   [x]  SOB w/ exertion or RR greater than 24 bpm []  Access- ory muscle use at rest. Abn.  resp. []  SOB at rest.   2   Bilateral Breath Sounds (BBS) []  Clear []  Diminish-ed bases  []  Diminish-ed t/o, or rales   [x]  Sporadic, scattered wheezes or rhonchi []  Persistentwheezes and, or absent BBS 3   Cough

## 2018-11-14 NOTE — PROGRESS NOTES
endurance;Decreased balance  Assessment: Pt demonstrated fair tolerance to tx with c/o 8-9/10 pain with short rest break between each ex. SP02 93-95% during tx  Treatment Diagnosis: Difficulty walking  Prognosis: Good  Patient Education: Reviewed importance of daily esercise and walking, pt with appropriate understanding, stating \"I will walk later\"  REQUIRES PT FOLLOW UP: Yes     G-Code     OutComes Score                                                    AM-PAC Score             Goals  Short term goals  Time Frame for Short term goals: 10 days  Short term goal 1: Pt. to amb. 100ft with FWW and SBA with O2 as needed in order to improve functional mobility. Short term goal 2: Pt. to complete all sit-stand transfers with FWW and SBA with no vc's required for technique. Short term goal 3: Pt. to be able to mary. 20-30 minutes of ther ex for improved functional endurance. Short term goal 4: Pt. to have good static and dynamic standing balance for decreased fall risk. Patient Goals   Patient goals : To be able to walk longer distances and breathe better    Plan    Plan  Times per week: 7  Times per day: Twice a day (daily on weekends)  Current Treatment Recommendations: Strengthening, Balance Training, Functional Mobility Training, Transfer Training, Gait Training, Endurance Training, Neuromuscular Re-education, Safety Education & Training, Patient/Caregiver Education & Training  Safety Devices  Type of devices:  All fall risk precautions in place, Call light within reach, Left in chair, Nurse notified     Therapy Time   Individual Concurrent Group Co-treatment   Time In 0740         Time Out 0803         Minutes [address] [facility], [id number]

## 2018-11-14 NOTE — PROGRESS NOTES
Balance  Standing - Static: Fair  Standing - Dynamic: Fair;-  Exercises  Straight Leg Raise: x15  Quad Sets: x15  Heelslides: x15  Gluteal Sets: x15  Hip Flexion: x15  Hip Abduction: x15  Knee Long Arc Quad: x15  Knee Short Arc Quad: x15  Ankle Pumps: 15x2  Comments: seated marching and hip add with pillow x15. B LE ther ex seated and reclined. Assessment   Body structures, Functions, Activity limitations: Decreased functional mobility ; Decreased strength;Decreased cognition;Decreased endurance;Decreased balance  Assessment: Pt demonstrated fair tolerance to tx with c/o 4-5/10 pain with short rest break between each ex. SP02 93-95% during tx  Treatment Diagnosis: Difficulty walking  Prognosis: Good  Patient Education: Reviewed importance of daily esercise and walking, pt with appropriate understanding. REQUIRES PT FOLLOW UP: Yes     G-Code     OutComes Score                                                    AM-PAC Score             Goals  Short term goals  Time Frame for Short term goals: 10 days  Short term goal 1: Pt. to amb. 100ft with FWW and SBA with O2 as needed in order to improve functional mobility. Short term goal 2: Pt. to complete all sit-stand transfers with FWW and SBA with no vc's required for technique. Short term goal 3: Pt. to be able to mary. 20-30 minutes of ther ex for improved functional endurance. Short term goal 4: Pt. to have good static and dynamic standing balance for decreased fall risk. Patient Goals   Patient goals : To be able to walk longer distances and breathe better    Plan    Plan  Times per week: 7  Times per day: Twice a day (daily on weekends)  Current Treatment Recommendations: Strengthening, Balance Training, Functional Mobility Training, Transfer Training, Gait Training, Endurance Training, Neuromuscular Re-education, Safety Education & Training, Patient/Caregiver Education & Training  Safety Devices  Type of devices:  All fall risk precautions in place, Call light within reach, Chair alarm in place, Gait belt, Left in chair, Nurse notified     Therapy Time   Individual Concurrent Group Co-treatment   Time In [address] [address]         Time Out [facility]         Minutes [address], [id number]

## 2018-11-14 NOTE — TELEPHONE ENCOUNTER
Matthew Webster RN called from ICU and stated that pt continues to be in afib but heart rate has been more controlled between 60-80's, most consistently with the 80's sometimes 90's and was wondering if Dr Jacquie Pierce would like the Cardizem drip stopped. Spoke with Dr Jacquie Pierce about above information and he had said to stop Drip. Sanjuan Litten RN to notify her and she verbalized understanding.

## 2018-11-14 NOTE — PROGRESS NOTES
Truong Ellis M.D. ICU Progress Note 18    SUBJECTIVE:    Pt seen and examined in the ICU for follow up of COPD exacerbation ([address] He went back into atrial fib with HR in the 150's last evening, requiring diltiazem drip for rate control. Did convert back to NSR during the night and drip was discontinued. However this AM converted back to atrial fibrillation. His breathing is a little better. He is able to speak in full sentences today. Had a headache this AM but improved with tylenol. Still feels pretty jittery but is less confused today and no further hallucinations overnight. ROS:   Constitutional: negative  for fevers, and negative for chills. Respiratory: positive for shortness of breath, positive for cough, and positive for wheezing  Cardiovascular: negative for chest pain, and positive for palpitations  Gastrointestinal: negative for abdominal pain, negative for nausea,negative for vomiting, negative for diarrhea, and negative for constipation    All other systems were reviewed with the patient and are negative unless otherwise stated in HPI. OBJECTIVE:    Vitals:   Temp: 97.5 °F (36.4 °C)  Temp range:    Temp  Av.5 °F (36.9 °C)  Min: 97.5 °F (36.4 °C)  Max: 99.3 °F (37.4 °C)    BP: 126/89  BP Range:      Systolic (99HET), RDJ:725 , Min:96 , MICHELLE:296      Diastolic (91UVQ), AAA:37, Min:72, Max:112    Pulse: 83  Pulse Range:    Pulse  Av.9  Min: 72  Max: 155    Resp: 18  Resp Range:   Resp  Av.4  Min: 17  Max: 22    SpO2: 99 % on supplemental O2  SpO2 range:   SpO2  Av %  Min: 93 %  Max: 100 %    24HR INTAKE/OUTPUT:    Intake/Output Summary (Last 24 hours) at 18 0877  Last data filed at 18 0745   Gross per 24 hour   Intake             3470 ml   Output             1000 ml   Net             2470 ml     -----------------------------------------------------------------  Exam:  GEN:    Awake, alert and oriented x 3 today.  no acute distress  EYES:  EOMI, pupils

## 2018-11-14 NOTE — PROGRESS NOTES
Patient continues to be in afib on monitor. HR 70-90s. Dr. Mary Anne Hanley updated, cardizem gtt stopped at this time.

## 2018-11-14 NOTE — PROGRESS NOTES
Dr. Siobhan Wagner EKG results, orders received. Cardizem gtt restarted at this time. Patient remains in afib on monitor. HR 130s at this time. Patient admits to feeling his heart \"race\" at times. Will continue to monitor.

## 2018-11-14 NOTE — PROGRESS NOTES
Patient continuously picking at BiPAP mask, will not keep on. Mask removed and placed back on NC at 2L.

## 2018-11-14 NOTE — PROGRESS NOTES
alarm in place          Plan   Plan  Times per week: 7  Times per day: Daily  Plan weeks: 10 weeks 2x/day; daily weekends  Current Treatment Recommendations: Strengthening, Functional Mobility Training, Endurance Training, Self-Care / ADL, Safety Education & Training, Balance Training  G-Code     OutComes Score                                           AM-PAC Score             Goals  Short term goals  Time Frame for Short term goals: 7 days  Short term goal 1: complete functional transfer (ie. toileting/ADL) with SBA  Short term goal 2: tolerate 20 min of simple ADL tasks  Short term goal 3: to demonstrate good balance during ADL task  Patient Goals   Patient goals : to return home       Therapy Time   Individual Concurrent Group Co-treatment   Time In 1250         Time Out [address], [place]

## 2018-11-15 LAB
ANION GAP SERPL CALCULATED.3IONS-SCNC: 6 MMOL/L (ref 9–17)
BUN BLDV-MCNC: 20 MG/DL (ref 6–20)
BUN/CREAT BLD: 43 (ref 9–20)
CALCIUM SERPL-MCNC: 8.3 MG/DL (ref 8.6–10.4)
CHLORIDE BLD-SCNC: 95 MMOL/L (ref 98–107)
CO2: 34 MMOL/L (ref 20–31)
CREAT SERPL-MCNC: 0.46 MG/DL (ref 0.7–1.2)
EKG ATRIAL RATE: 71 BPM
EKG ATRIAL RATE: 82 BPM
EKG P AXIS: 80 DEGREES
EKG P AXIS: 84 DEGREES
EKG P-R INTERVAL: 128 MS
EKG P-R INTERVAL: 142 MS
EKG Q-T INTERVAL: 384 MS
EKG Q-T INTERVAL: 386 MS
EKG QRS DURATION: 82 MS
EKG QRS DURATION: 88 MS
EKG QTC CALCULATION (BAZETT): 417 MS
EKG QTC CALCULATION (BAZETT): 450 MS
EKG R AXIS: -18 DEGREES
EKG R AXIS: 34 DEGREES
EKG T AXIS: 79 DEGREES
EKG T AXIS: 80 DEGREES
EKG VENTRICULAR RATE: 71 BPM
EKG VENTRICULAR RATE: 82 BPM
GFR AFRICAN AMERICAN: >60 ML/MIN
GFR NON-AFRICAN AMERICAN: >60 ML/MIN
GFR SERPL CREATININE-BSD FRML MDRD: ABNORMAL ML/MIN/{1.73_M2}
GFR SERPL CREATININE-BSD FRML MDRD: ABNORMAL ML/MIN/{1.73_M2}
GLUCOSE BLD-MCNC: 77 MG/DL (ref 70–99)
POTASSIUM SERPL-SCNC: 3.6 MMOL/L (ref 3.7–5.3)
SODIUM BLD-SCNC: 135 MMOL/L (ref 135–144)

## 2018-11-15 PROCEDURE — 2580000003 HC RX 258: Performed by: INTERNAL MEDICINE

## 2018-11-15 PROCEDURE — 97110 THERAPEUTIC EXERCISES: CPT

## 2018-11-15 PROCEDURE — 93005 ELECTROCARDIOGRAM TRACING: CPT

## 2018-11-15 PROCEDURE — 94664 DEMO&/EVAL PT USE INHALER: CPT

## 2018-11-15 PROCEDURE — 6370000000 HC RX 637 (ALT 250 FOR IP): Performed by: INTERNAL MEDICINE

## 2018-11-15 PROCEDURE — 97116 GAIT TRAINING THERAPY: CPT

## 2018-11-15 PROCEDURE — 2000000000 HC ICU R&B

## 2018-11-15 PROCEDURE — 2700000000 HC OXYGEN THERAPY PER DAY

## 2018-11-15 PROCEDURE — 80048 BASIC METABOLIC PNL TOTAL CA: CPT

## 2018-11-15 PROCEDURE — 6360000002 HC RX W HCPCS: Performed by: INTERNAL MEDICINE

## 2018-11-15 PROCEDURE — 94640 AIRWAY INHALATION TREATMENT: CPT

## 2018-11-15 PROCEDURE — 36415 COLL VENOUS BLD VENIPUNCTURE: CPT

## 2018-11-15 PROCEDURE — 6360000002 HC RX W HCPCS

## 2018-11-15 PROCEDURE — 99232 SBSQ HOSP IP/OBS MODERATE 35: CPT | Performed by: INTERNAL MEDICINE

## 2018-11-15 RX ORDER — LORAZEPAM 2 MG/ML
INJECTION INTRAMUSCULAR
Status: COMPLETED
Start: 2018-11-15 | End: 2018-11-15

## 2018-11-15 RX ORDER — IPRATROPIUM BROMIDE AND ALBUTEROL SULFATE 2.5; .5 MG/3ML; MG/3ML
1 SOLUTION RESPIRATORY (INHALATION) 4 TIMES DAILY
Status: DISCONTINUED | OUTPATIENT
Start: 2018-11-15 | End: 2018-11-15

## 2018-11-15 RX ORDER — IPRATROPIUM BROMIDE AND ALBUTEROL SULFATE 2.5; .5 MG/3ML; MG/3ML
1 SOLUTION RESPIRATORY (INHALATION) 3 TIMES DAILY
Status: DISCONTINUED | OUTPATIENT
Start: 2018-11-15 | End: 2018-11-16 | Stop reason: HOSPADM

## 2018-11-15 RX ADMIN — IPRATROPIUM BROMIDE AND ALBUTEROL SULFATE 1 AMPULE: .5; 3 SOLUTION RESPIRATORY (INHALATION) at 22:59

## 2018-11-15 RX ADMIN — DILTIAZEM HYDROCHLORIDE 180 MG: 180 CAPSULE, COATED, EXTENDED RELEASE ORAL at 08:19

## 2018-11-15 RX ADMIN — MONTELUKAST SODIUM 10 MG: 10 TABLET, FILM COATED ORAL at 08:19

## 2018-11-15 RX ADMIN — FAMOTIDINE 20 MG: 20 TABLET ORAL at 08:20

## 2018-11-15 RX ADMIN — SOTALOL HYDROCHLORIDE 80 MG: 80 TABLET ORAL at 20:12

## 2018-11-15 RX ADMIN — POTASSIUM CHLORIDE 20 MEQ: 20 TABLET, EXTENDED RELEASE ORAL at 08:20

## 2018-11-15 RX ADMIN — TAMSULOSIN HYDROCHLORIDE 0.4 MG: 0.4 CAPSULE ORAL at 08:20

## 2018-11-15 RX ADMIN — CEFTRIAXONE 1 G: 1 INJECTION, POWDER, FOR SOLUTION INTRAMUSCULAR; INTRAVENOUS at 20:07

## 2018-11-15 RX ADMIN — LORAZEPAM 0.5 MG: 2 INJECTION INTRAMUSCULAR; INTRAVENOUS at 20:29

## 2018-11-15 RX ADMIN — HYDROCODONE BITARTRATE AND ACETAMINOPHEN 1 TABLET: 5; 325 TABLET ORAL at 16:46

## 2018-11-15 RX ADMIN — LOSARTAN POTASSIUM 25 MG: 25 TABLET, FILM COATED ORAL at 08:25

## 2018-11-15 RX ADMIN — APIXABAN 5 MG: 5 TABLET, FILM COATED ORAL at 08:20

## 2018-11-15 RX ADMIN — FAMOTIDINE 20 MG: 20 TABLET ORAL at 20:12

## 2018-11-15 RX ADMIN — Medication 2 PUFF: at 22:59

## 2018-11-15 RX ADMIN — Medication 2 PUFF: at 09:37

## 2018-11-15 RX ADMIN — IPRATROPIUM BROMIDE AND ALBUTEROL SULFATE 1 AMPULE: .5; 3 SOLUTION RESPIRATORY (INHALATION) at 15:56

## 2018-11-15 RX ADMIN — THERA TABS 1 TABLET: TAB at 08:20

## 2018-11-15 RX ADMIN — HYDROCODONE BITARTRATE AND ACETAMINOPHEN 1 TABLET: 5; 325 TABLET ORAL at 08:28

## 2018-11-15 RX ADMIN — FUROSEMIDE 40 MG: 40 TABLET ORAL at 08:19

## 2018-11-15 RX ADMIN — SOTALOL HYDROCHLORIDE 80 MG: 80 TABLET ORAL at 08:19

## 2018-11-15 RX ADMIN — CITALOPRAM HYDROBROMIDE 40 MG: 20 TABLET ORAL at 08:20

## 2018-11-15 RX ADMIN — APIXABAN 5 MG: 5 TABLET, FILM COATED ORAL at 20:12

## 2018-11-15 RX ADMIN — PSEUDOEPHEDRINE HYDROCHLORIDE 120 MG: 120 TABLET, FILM COATED, EXTENDED RELEASE ORAL at 20:12

## 2018-11-15 RX ADMIN — IPRATROPIUM BROMIDE AND ALBUTEROL SULFATE 1 AMPULE: .5; 3 SOLUTION RESPIRATORY (INHALATION) at 09:36

## 2018-11-15 ASSESSMENT — PAIN DESCRIPTION - PAIN TYPE
TYPE: CHRONIC PAIN
TYPE: ACUTE PAIN

## 2018-11-15 ASSESSMENT — PAIN DESCRIPTION - ORIENTATION
ORIENTATION: LOWER;UPPER
ORIENTATION: LOWER;UPPER

## 2018-11-15 ASSESSMENT — PAIN DESCRIPTION - DESCRIPTORS
DESCRIPTORS: ACHING
DESCRIPTORS: ACHING

## 2018-11-15 ASSESSMENT — PAIN DESCRIPTION - LOCATION
LOCATION: BACK
LOCATION: BACK

## 2018-11-15 ASSESSMENT — PAIN SCALES - GENERAL
PAINLEVEL_OUTOF10: 0
PAINLEVEL_OUTOF10: 0
PAINLEVEL_OUTOF10: 5
PAINLEVEL_OUTOF10: 4
PAINLEVEL_OUTOF10: 0

## 2018-11-15 ASSESSMENT — PAIN DESCRIPTION - FREQUENCY
FREQUENCY: INTERMITTENT
FREQUENCY: INTERMITTENT

## 2018-11-15 ASSESSMENT — PAIN DESCRIPTION - ONSET: ONSET: ON-GOING

## 2018-11-15 NOTE — PLAN OF CARE
Problem: Falls - Risk of:  Goal: Will remain free from falls  Will remain free from falls   Outcome: Ongoing  Bed alarm on. Bed in low position and wheels locked. Call light in reach. Falling star posted on door. Yellow bracelet on. Non skid socks on. Side rails up. Will continue to assess.
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
## 2018-11-15 NOTE — PROGRESS NOTES
Physical Therapy  Facility/Department: [facility] AT THE [facility]  Daily Treatment Note  NAME: Placido Conde  : 1965  MRN: [id number]    Date of Service: 11/15/2018    Discharge Recommendations:  Continue to assess pending progress, IP Rehab, ECF with PT, [address] [facility], Home with nursing aide        Patient Diagnosis(es): The primary encounter diagnosis was Acute on chronic respiratory failure with hypoxia and hypercapnia ([address] Diagnoses of COPD exacerbation ([address] and Tobacco abuse were also pertinent to this visit. has a past medical history of Arthritis; COPD (chronic obstructive pulmonary disease) ([address] Hypertension; Oxygen dependent; and Scoliosis. has a past surgical history that includes Tonsillectomy; other surgical history (Left, 2017); and Facial reconstruction surgery (Left, 2017). Restrictions  Restrictions/Precautions  Restrictions/Precautions: General Precautions, Fall Risk  Subjective   General  Chart Reviewed: Yes  Response To Previous Treatment: Patient with no complaints from previous session. Family / Caregiver Present: No  Referring Practitioner: Dr. Euna Baumgarten: Pt reports 6-7/10 pain in B LE          Orientation  Orientation  Overall Orientation Status: Within Functional Limits  Orientation Level: Oriented to place;Oriented to time;Oriented to situation;Oriented to person     Exercises  Straight Leg Raise: x20  Quad Sets: x20  Heelslides: x20  Gluteal Sets: x20  Hip Flexion: x20  Hip Abduction: x20  Knee Short Arc Quad: x20  Ankle Pumps: x20 x2  Comments: Above exercises completed in supine. Assessment   Body structures, Functions, Activity limitations: Decreased functional mobility ; Decreased strength;Decreased cognition;Decreased endurance;Decreased balance  Treatment Diagnosis: Difficulty walking  Prognosis: Good  Patient Education: Educated pt on importance of physical therapy with pt demonstrating good understanding.    REQUIRES PT FOLLOW

## 2018-11-15 NOTE — PROGRESS NOTES
Pt is to meet with hospice today and requests that his girlfriend be here for meeting, Mendoza Black called his girlfriend Christelle Sam and she states that the meeting is at 2 pm today and she will be here.

## 2018-11-15 NOTE — PROGRESS NOTES
Patient: Huston Ormond  : 1965  Date of Admission: 2018  Primary Care Physician: Vic Phelps  Today's Date: 2018    REASON FOR CONSULTATION: Paroxysmal atrial fibrillation    HPI: Mr. Merissa Fitzgerald is a 48 y.o. male known to me from prior hospital admissions. He has a history of severe COPD diagnosed back in , chronic hypoxemic respiratory failure on home oxygen therapy and paroxysmal atrial fibrillation. Patient admitted with acute on chronic respiratory failure. He has recurrent paroxysmal atrial fibrillation requiring intravenous diltiazem. He converted to normal sinus rhythm this morning. Patient already on anticoagulation with Eliquis. Started on sotalol today for rhythm control. History:     PAST MEDICAL HISTORY:  Past Medical History:   Diagnosis Date    Arthritis     COPD (chronic obstructive pulmonary disease) ([address]     Hypertension     Oxygen dependent     Scoliosis        SURGICAL HISTORY:  Past Surgical History:   Procedure Laterality Date    FACIAL RECONSTRUCTION SURGERY Left 2017    FACIAL LESION BIOPSY EXCISION LEFT CHEEK performed by Mary Tubbs DO at [address] Left 2017    facial lesion biopsy    TONSILLECTOMY         SOCIAL HISTORY:  Social History   Substance Use Topics    Smoking status: Current Some Day Smoker     Packs/day: 0.10     Types: Cigarettes    Smokeless tobacco: Never Used      Comment: cutting down to 1 per day    Alcohol use No      Comment: last drink 6 months ago/48oz beer per day       FAMILY HISTORY:   family history includes Cancer in his father; Diabetes in his paternal grandmother; Emphysema in his father; Stroke in his mother and paternal grandmother. CURRENT ALLERGIES:  Patient has no known allergies.     Medications:     CURRENT MEDICATIONS:  Outpatient Prescriptions Marked as Taking for the 18 encounter [facility] Encounter)   Medication Sig Dispense Refill    HYDROcodone-acetaminophen (NORCO) 5-325 MG per tablet Take 1 tablet by mouth every 6 hours as needed for Pain. Yohan Sadie furosemide (LASIX) 40 MG tablet Take 40 mg by mouth 2 times daily 20 mg every other day      Roflumilast 250 MCG TABS Take 1 tablet by mouth daily 30 tablet 5    citalopram (CELEXA) 40 MG tablet Take 1 tablet by mouth daily 90 tablet 3    tamsulosin (FLOMAX) 0.4 MG capsule Take 0.4 mg by mouth daily       potassium chloride (KLOR-CON M) 10 MEQ extended release tablet Take 2 tablets by mouth daily 60 tablet 3    apixaban (ELIQUIS) 5 MG TABS tablet Take 1 tablet by mouth 2 times daily 60 tablet 0    diltiazem (CARTIA XT) 180 MG extended release capsule Take 1 capsule by mouth daily 30 capsule 0    pseudoephedrine (SUDAFED 12 HR) 120 MG extended release tablet Take 120 mg by mouth every 12 hours      montelukast (SINGULAIR) 10 MG tablet Take 1 tablet by mouth daily (Patient taking differently: Take 10 mg by mouth nightly ) 30 tablet 3    nicotine (NICODERM CQ) 21 MG/24HR Place 1 patch onto the skin every 24 hours 30 patch 3    VENTOLIN  (90 Base) MCG/ACT inhaler INHALE 2 PUFFS BY MOUTH EVERY 6 HOURS AS NEEDED FOR WHEEZING 1 Inhaler 3    INCRUSE ELLIPTA 62.5 MCG/INH AEPB INHALE ONE PUFF INTO THE LUNGS ONCE DAILY 1 each 11    SYMBICORT 80-4.5 MCG/ACT AERO INHALE TWO PUFFS BY MOUTH INTO LUNGS TWICE DAILY 3 Inhaler 3    ipratropium (ATROVENT) 0.02 % nebulizer solution Take 2.5 mLs by nebulization 4 times daily 300 mL 11    albuterol (PROVENTIL) (2.5 MG/3ML) 0.083% nebulizer solution Take 3 mLs by nebulization every 6 hours as needed for Wheezing 120 each 11     SCHEDULED MEDICATIONS:   diltiazem  180 mg Oral Daily    sotalol  80 mg Oral BID    losartan  25 mg Oral Daily    multivitamin  1 tablet Oral Daily    ipratropium-albuterol  1 ampule Inhalation TID    mometasone-formoterol  2 puff Inhalation BID    nicotine  1 patch Transdermal Q24H    pseudoephedrine  120 mg Oral BID    montelukast  10 mg Oral Daily    potassium chloride  20 mEq Oral Daily    apixaban  5 mg Oral BID    tamsulosin  0.4 mg Oral Daily    citalopram  40 mg Oral Daily    furosemide  40 mg Oral Daily    Roflumilast  250 mcg Oral Daily    sodium chloride flush  10 mL Intravenous 2 times per day    famotidine  20 mg Oral BID    cefTRIAXone (ROCEPHIN) IV  1 g Intravenous Q24H     CONTINUOUS INFUSIONS:   diltiazem (CARDIZEM) 125 mg in dextrose 5% 125 mL infusion Stopped (11/14/18 1409)    sodium chloride 75 mL/hr at 11/14/18 0948     PRN MEDS:  acetaminophen, LORazepam, albuterol, loperamide, aluminum & magnesium hydroxide-simethicone, HYDROcodone-acetaminophen, sodium chloride flush, magnesium hydroxide    Review of Systems:     Positive and Negative as described in HPI  Positive and negative are mentioned in HPI. Code Status:  DNR-CCA    Objective:     PHYSICAL EXAM:     BP (!) 122/96   Pulse 84   Temp 98.3 °F (36.8 °C) (Temporal)   Resp 18   Ht 5' 4\" (1.626 m)   Wt 100 lb 11.2 oz (45.7 kg)   SpO2 100%   BMI 17.29 kg/m²   I/O last 3 completed shifts: In: 6697 [P.O.:520; I.V.:1830]  Out: 1425 [Urine:1425]  No intake/output data recorded. Constitutional: He is oriented to person, place, and time. He appears Cachectic. HEENT: Normocephalic and atraumatic. No JVD present. Carotid bruit is not present. No mass and no thyromegaly present. No lymphadenopathy present. Cardiovascular: Normal rate, irregularly irregular rhythm. Variable S1. Normal S2. No murmur, rubs or gallops. Pulmonary/Chest: Poor air entry bilaterally. Scattered wheezes. No crackles. Abdominal: Soft, non-tender. Bowel sounds and aorta are normal. He exhibits no organomegaly, mass or bruit. Extremities: No edema, cyanosis, or clubbing. Pulses are 2+ radial/carotid/dorsalis pedis and posterior tibial bilaterally. Neurological: He is alert and oriented to person, place, and time. No evidence of gross cranial nerve deficit. thank you for allowing me to participate in this patients care. Please do not hesitate to contact me could I be of further assistance. Sincerely,  Ramy Gee MD, F.A.C.C. [address] [facility] Cardiology Specialist, [address] [place] [address], [address]  Phone: [phone], Fax: [phone]  Based on the patients current medical conditions, I believe the risk of significant morbidity and mortality is: Intermediate to high.     Copy sent to  [address] [place] [address], APRN - CNP

## 2018-11-15 NOTE — PROGRESS NOTES
Patient: Bijal Ramires  : 1965  Date of Admission: 2018  Primary Care Physician: Geo Phelps  Today's Date: 11/15/2018    REASON FOR CONSULTATION: Paroxysmal atrial fibrillation    HPI: Mr. Domitila Bolton is a 48 y.o. male known to me from prior hospital admissions. He has a history of severe COPD diagnosed back in , chronic hypoxemic respiratory failure on home oxygen therapy and paroxysmal atrial fibrillation. Patient admitted with acute on chronic respiratory failure. He has recurrent paroxysmal atrial fibrillation. Patient already on anticoagulation with Eliquis. Started on sotalol today for rhythm control. Doing well on sotalol. Maintaining sinus rhythm this morning. QT interval is less than 500 ms. Heart rate is controlled. History:     PAST MEDICAL HISTORY:  Past Medical History:   Diagnosis Date    Arthritis     COPD (chronic obstructive pulmonary disease) ([address]     Hypertension     Oxygen dependent     Scoliosis        SURGICAL HISTORY:  Past Surgical History:   Procedure Laterality Date    FACIAL RECONSTRUCTION SURGERY Left 2017    FACIAL LESION BIOPSY EXCISION LEFT CHEEK performed by Galo Rashid DO at [address] [place] [address] Left 2017    facial lesion biopsy    TONSILLECTOMY         SOCIAL HISTORY:  Social History   Substance Use Topics    Smoking status: Current Some Day Smoker     Packs/day: 0.10     Types: Cigarettes    Smokeless tobacco: Never Used      Comment: cutting down to 1 per day    Alcohol use No      Comment: last drink 6 months ago/48oz beer per day       FAMILY HISTORY:   family history includes Cancer in his father; Diabetes in his paternal grandmother; Emphysema in his father; Stroke in his mother and paternal grandmother. CURRENT ALLERGIES:  Patient has no known allergies.     Medications:     CURRENT MEDICATIONS:  Outpatient Prescriptions Marked as Taking for the 18 encounter [facility] Encounter)   Medication deficit. Coordination appeared normal.   Skin: Skin is warm and dry. There is no rash or diaphoresis. Psychiatric: He has a normal mood and affect. His speech is normal and behavior is normal.      Labs and Imaging:       MOST RECENT LABS ON RECORD:   Lab Results   Component Value Date    WBC 8.1 11/12/2018    HGB 11.0 (L) 11/12/2018    HCT 33.8 (L) 11/12/2018     11/12/2018    CHOL 218 (H) 12/26/2017    TRIG 75 12/26/2017    HDL 75 12/26/2017    ALT 25 11/09/2018    AST 19 11/09/2018     11/15/2018    K 3.6 (L) 11/15/2018    CL 95 (L) 11/15/2018    CREATININE 0.46 (L) 11/15/2018    BUN 20 11/15/2018    CO2 34 (H) 11/15/2018    PSA 1.69 06/23/2017    INR 1.1 11/09/2018    LABA1C 5.4 01/09/2018    LABMICR CANNOT BE CALCULATED 12/26/2017       Assessment and Plan:       ASSESSMENT:  Patient Active Problem List    Diagnosis Date Noted    Paroxysmal atrial fibrillation ([facility] 08/11/2018     Priority: High     Class: Acute    Essential hypertension, benign 10/26/2018    Moderate malnutrition ([address] 10/09/2018    Hypertension     Closed fracture of one rib of left side 08/16/2018    Severe anxiety 08/15/2018    ETOH abuse 08/15/2018    Atrial fibrillation with RVR (HCC)     Non-cardiac chest pain     Tobacco abuse 08/10/2018    Acute on chronic respiratory failure ([address] 08/09/2018    Chronic respiratory failure with hypoxia (HCC) 06/11/2018    COPD exacerbation ([address] 06/09/2018    Dyslipidemia 04/11/2017    COPD, severe ([address] 03/28/2017    Uncontrolled hypertension 03/28/2017       PLAN:  Recurrent paroxysmal atrial fibrillation with rapid ventricular response. Poor candidate for rate control. We decided to try rate control with sotalol. Started on sotalol 80 mg twice a day, Maintaining sinus rhythm. QTc interval is less than 500 ms this morning. Continue anticoagulation with Eliquis 5 mg twice a day. Continue oral diltiazem 180 mg daily. Will follow-up.     Once again, thank you for allowing me to participate in this patients care. Please do not hesitate to contact me could I be of further assistance. Sincerely,  Sharan Washington MD, F.A.C.C. [facility] Cardiology Specialist, [address] [facility], [address]  Phone: [phone], Fax: [phone]  Based on the patients current medical conditions, I believe the risk of significant morbidity and mortality is: Intermediate to high.     Copy sent to Dr. Carrie Cooper, APRN - CNP

## 2018-11-16 VITALS
RESPIRATION RATE: 14 BRPM | SYSTOLIC BLOOD PRESSURE: 93 MMHG | TEMPERATURE: 98.8 F | DIASTOLIC BLOOD PRESSURE: 68 MMHG | WEIGHT: 99.8 LBS | HEIGHT: 64 IN | OXYGEN SATURATION: 95 % | BODY MASS INDEX: 17.04 KG/M2 | HEART RATE: 80 BPM

## 2018-11-16 LAB
ANION GAP SERPL CALCULATED.3IONS-SCNC: 4 MMOL/L (ref 9–17)
BUN BLDV-MCNC: 19 MG/DL (ref 6–20)
BUN/CREAT BLD: 30 (ref 9–20)
CALCIUM SERPL-MCNC: 8.5 MG/DL (ref 8.6–10.4)
CHLORIDE BLD-SCNC: 94 MMOL/L (ref 98–107)
CO2: 39 MMOL/L (ref 20–31)
CREAT SERPL-MCNC: 0.63 MG/DL (ref 0.7–1.2)
GFR AFRICAN AMERICAN: >60 ML/MIN
GFR NON-AFRICAN AMERICAN: >60 ML/MIN
GFR SERPL CREATININE-BSD FRML MDRD: ABNORMAL ML/MIN/{1.73_M2}
GFR SERPL CREATININE-BSD FRML MDRD: ABNORMAL ML/MIN/{1.73_M2}
GLUCOSE BLD-MCNC: 82 MG/DL (ref 70–99)
POTASSIUM SERPL-SCNC: 4.5 MMOL/L (ref 3.7–5.3)
SODIUM BLD-SCNC: 137 MMOL/L (ref 135–144)

## 2018-11-16 PROCEDURE — 6370000000 HC RX 637 (ALT 250 FOR IP): Performed by: INTERNAL MEDICINE

## 2018-11-16 PROCEDURE — 6370000000 HC RX 637 (ALT 250 FOR IP): Performed by: PHYSICIAN ASSISTANT

## 2018-11-16 PROCEDURE — 97110 THERAPEUTIC EXERCISES: CPT

## 2018-11-16 PROCEDURE — 97116 GAIT TRAINING THERAPY: CPT

## 2018-11-16 PROCEDURE — 36415 COLL VENOUS BLD VENIPUNCTURE: CPT

## 2018-11-16 PROCEDURE — 94660 CPAP INITIATION&MGMT: CPT

## 2018-11-16 PROCEDURE — 80048 BASIC METABOLIC PNL TOTAL CA: CPT

## 2018-11-16 PROCEDURE — 2580000003 HC RX 258: Performed by: INTERNAL MEDICINE

## 2018-11-16 PROCEDURE — 93005 ELECTROCARDIOGRAM TRACING: CPT

## 2018-11-16 PROCEDURE — 2700000000 HC OXYGEN THERAPY PER DAY

## 2018-11-16 PROCEDURE — 94640 AIRWAY INHALATION TREATMENT: CPT

## 2018-11-16 RX ORDER — MULTIVITAMIN WITH FOLIC ACID 400 MCG
1 TABLET ORAL DAILY
Refills: 0 | DISCHARGE
Start: 2018-11-17 | End: 2019-01-22 | Stop reason: CLARIF

## 2018-11-16 RX ORDER — LORAZEPAM 0.5 MG/1
0.5 TABLET ORAL EVERY 8 HOURS PRN
Qty: 20 TABLET | Refills: 0 | Status: SHIPPED | OUTPATIENT
Start: 2018-11-16 | End: 2018-11-21

## 2018-11-16 RX ORDER — HYDROCODONE BITARTRATE AND ACETAMINOPHEN 5; 325 MG/1; MG/1
1 TABLET ORAL EVERY 6 HOURS PRN
Qty: 20 TABLET | Refills: 0 | Status: SHIPPED | OUTPATIENT
Start: 2018-11-16 | End: 2018-11-21

## 2018-11-16 RX ORDER — FAMOTIDINE 20 MG/1
20 TABLET, FILM COATED ORAL 2 TIMES DAILY
Qty: 60 TABLET | Refills: 3 | DISCHARGE
Start: 2018-11-16 | End: 2019-01-22 | Stop reason: CLARIF

## 2018-11-16 RX ORDER — CEFUROXIME AXETIL 500 MG/1
500 TABLET ORAL 2 TIMES DAILY
DISCHARGE
Start: 2018-11-16 | End: 2018-11-23

## 2018-11-16 RX ORDER — FUROSEMIDE 40 MG/1
40 TABLET ORAL DAILY
Qty: 7 TABLET | Refills: 0 | DISCHARGE
Start: 2018-11-16 | End: 2019-01-22 | Stop reason: CLARIF

## 2018-11-16 RX ORDER — SOTALOL HYDROCHLORIDE 80 MG/1
80 TABLET ORAL 2 TIMES DAILY
Qty: 60 TABLET | Refills: 3 | DISCHARGE
Start: 2018-11-16

## 2018-11-16 RX ORDER — LOSARTAN POTASSIUM 25 MG/1
25 TABLET ORAL DAILY
Qty: 30 TABLET | Refills: 0 | Status: ON HOLD | DISCHARGE
Start: 2018-11-17 | End: 2019-02-01 | Stop reason: HOSPADM

## 2018-11-16 RX ADMIN — HYDROCODONE BITARTRATE AND ACETAMINOPHEN 1 TABLET: 5; 325 TABLET ORAL at 06:45

## 2018-11-16 RX ADMIN — FUROSEMIDE 40 MG: 40 TABLET ORAL at 08:55

## 2018-11-16 RX ADMIN — MONTELUKAST SODIUM 10 MG: 10 TABLET, FILM COATED ORAL at 08:55

## 2018-11-16 RX ADMIN — FAMOTIDINE 20 MG: 20 TABLET ORAL at 08:55

## 2018-11-16 RX ADMIN — IPRATROPIUM BROMIDE AND ALBUTEROL SULFATE 1 AMPULE: .5; 3 SOLUTION RESPIRATORY (INHALATION) at 11:35

## 2018-11-16 RX ADMIN — PSEUDOEPHEDRINE HYDROCHLORIDE 120 MG: 120 TABLET, FILM COATED, EXTENDED RELEASE ORAL at 08:55

## 2018-11-16 RX ADMIN — CITALOPRAM HYDROBROMIDE 40 MG: 20 TABLET ORAL at 08:55

## 2018-11-16 RX ADMIN — THERA TABS 1 TABLET: TAB at 08:55

## 2018-11-16 RX ADMIN — APIXABAN 5 MG: 5 TABLET, FILM COATED ORAL at 08:55

## 2018-11-16 RX ADMIN — ACETAMINOPHEN 650 MG: 325 TABLET, FILM COATED ORAL at 08:54

## 2018-11-16 RX ADMIN — TAMSULOSIN HYDROCHLORIDE 0.4 MG: 0.4 CAPSULE ORAL at 08:55

## 2018-11-16 RX ADMIN — Medication 2 PUFF: at 11:35

## 2018-11-16 RX ADMIN — POTASSIUM CHLORIDE 20 MEQ: 20 TABLET, EXTENDED RELEASE ORAL at 08:55

## 2018-11-16 RX ADMIN — Medication 10 ML: at 08:57

## 2018-11-16 RX ADMIN — DILTIAZEM HYDROCHLORIDE 180 MG: 180 CAPSULE, COATED, EXTENDED RELEASE ORAL at 08:55

## 2018-11-16 RX ADMIN — SOTALOL HYDROCHLORIDE 80 MG: 80 TABLET ORAL at 08:55

## 2018-11-16 ASSESSMENT — PAIN DESCRIPTION - FREQUENCY
FREQUENCY: INTERMITTENT
FREQUENCY: INTERMITTENT

## 2018-11-16 ASSESSMENT — PAIN DESCRIPTION - LOCATION
LOCATION: HEAD;NECK;BACK
LOCATION: HEAD

## 2018-11-16 ASSESSMENT — PAIN DESCRIPTION - ONSET
ONSET: GRADUAL
ONSET: ON-GOING

## 2018-11-16 ASSESSMENT — PAIN SCALES - GENERAL
PAINLEVEL_OUTOF10: 4
PAINLEVEL_OUTOF10: 0
PAINLEVEL_OUTOF10: 8
PAINLEVEL_OUTOF10: 4

## 2018-11-16 ASSESSMENT — PAIN DESCRIPTION - DESCRIPTORS
DESCRIPTORS: ACHING;SORE
DESCRIPTORS: ACHING

## 2018-11-16 ASSESSMENT — PAIN DESCRIPTION - PAIN TYPE
TYPE: ACUTE PAIN
TYPE: ACUTE PAIN;CHRONIC PAIN

## 2018-11-16 ASSESSMENT — PAIN DESCRIPTION - PROGRESSION
CLINICAL_PROGRESSION: GRADUALLY IMPROVING
CLINICAL_PROGRESSION: GRADUALLY WORSENING

## 2018-11-16 ASSESSMENT — PAIN DESCRIPTION - ORIENTATION
ORIENTATION: MID
ORIENTATION: UPPER;MID

## 2018-11-16 NOTE — PLAN OF CARE
Pt up in chair and completed the EKG at the room for the follow up of the administration of the [place].

## 2018-11-16 NOTE — PROGRESS NOTES
[facility] here to transport patient to [facility] at this time.  Patient belongings sent along

## 2018-11-16 NOTE — DISCHARGE SUMMARY
abnormalities. The esophagus shows no significant abnormalities. Lungs/pleura: Moderate to severe centrilobular emphysema unchanged. There is a new irregular somewhat spiculated right lung apex nodule 1.5 x 1.5 cm with thick pleural tag to the right. .  There is also a new irregular solid somewhat spiculated nodule in the medial basal right lower lobe 1.5 x 0.9 cm. There may be a tiny satellite nodule anterior to the right. These findings are in a larger area of septal thickening. Possibility of this in nodules being neoplastic is not excluded. There are 2 subpleural nodules in the posterior right lower lobe larger of which measures 6 mm (series 3, image 95 and 93). No pleural effusion or pneumothorax. Upper Abdomen: No significant abnormalities. Soft Tissues/Bones: No acute abnormality of the bones. The superficial soft tissues show no significant abnormalities. 1. There are 2 new nodules which are irregular and somewhat spiculated measuring 1.5 x 1.5 cm and 1.5 x 0.9 cm in the right lung apex and medial basal right lower lobe respectively. Neoplasm not excluded. PET-CT or tissue sampling is recommended. 2. Moderate to severe centrilobular emphysema unchanged. Xr Chest Portable    Result Date: 11/9/2018  EXAMINATION: SINGLE XRAY VIEW OF THE CHEST 11/9/2018 2:49 pm COMPARISON: October 8, 2018, June 9, 2018 HISTORY: ORDERING SYSTEM PROVIDED HISTORY: dyspnea TECHNOLOGIST PROVIDED HISTORY: dyspnea FINDINGS: The cardiomediastinal silhouette is unchanged in appearance. Hyperinflation and findings of emphysema are again demonstrated. There is no consolidation, pneumothorax, or evidence of edema. No effusion is appreciated. Subacute lateral left 8th rib fracture. The osseous structures are unchanged in appearance. Emphysema. No acute airspace disease identified.        Discharge Diagnoses:    Principal Problem:    COPD exacerbation ([address]  Active Problems:    Paroxysmal atrial fibrillation (HCC)    COPD, AEPB  INHALE ONE PUFF INTO THE LUNGS ONCE DAILY             ipratropium (ATROVENT) 0.02 % nebulizer solution  Take 2.5 mLs by nebulization 4 times daily             loperamide (IMODIUM) 2 MG capsule  Take 2 mg by mouth 4 times daily as needed for Diarrhea             LORazepam (ATIVAN) 0.5 MG tablet  Take 1 tablet by mouth every 8 hours as needed for Anxiety for up to 5 days. Jolaine Dent losartan (COZAAR) 25 MG tablet  Take 1 tablet by mouth daily             magnesium hydroxide (MILK OF MAGNESIA) 400 MG/5ML suspension  Take by mouth daily as needed for Constipation             montelukast (SINGULAIR) 10 MG tablet  Take 1 tablet by mouth daily             Multiple Vitamin (MULTIVITAMIN) tablet  Take 1 tablet by mouth daily             nicotine (NICODERM CQ) 21 MG/24HR  Place 1 patch onto the skin every 24 hours             Nutritional Supplements (ENSURE COMPLETE) LIQD  Take 2 Bottles by mouth daily             potassium chloride (KLOR-CON M) 10 MEQ extended release tablet  Take 2 tablets by mouth daily             pseudoephedrine (SUDAFED 12 HR) 120 MG extended release tablet  Take 120 mg by mouth every 12 hours             Roflumilast 250 MCG TABS  Take 1 tablet by mouth daily             sotalol (BETAPACE) 80 MG tablet  Take 1 tablet by mouth 2 times daily             SYMBICORT 80-4.5 MCG/ACT AERO  INHALE TWO PUFFS BY MOUTH INTO LUNGS TWICE DAILY             tamsulosin (FLOMAX) 0.4 MG capsule  Take 0.4 mg by mouth daily              VENTOLIN  (90 Base) MCG/ACT inhaler  INHALE 2 PUFFS BY MOUTH EVERY 6 HOURS AS NEEDED FOR WHEEZING                 Patient Instructions:    Activity: activity as tolerated  Diet: regular diet  Wound Care: none needed  Other: PT/OT     Disposition:   DC to [facility]    Follow up:  Patient will be followed by ABHIJEET Funes CNP in 1-2 weeks    CORE MEASURES on Discharge (if applicable)  ACE/ARB in CHF: Yes  Statin in MI: NA  ASA in MI: NA  Statin in CVA:

## 2018-11-16 NOTE — PROGRESS NOTES
Physical Therapy  Facility/Department: [facility] AT THE [facility]  Daily Treatment Note  NAME: Jaguar Velázquez  : 1965  MRN: [id number]    Date of Service: 2018    Discharge Recommendations:  Continue to assess pending progress, IP Rehab, ECF with PT, [facility], Home with nursing aide        Patient Diagnosis(es): The primary encounter diagnosis was Acute on chronic respiratory failure with hypoxia and hypercapnia ([address] Diagnoses of COPD exacerbation ([address] Tobacco abuse, Other chronic pain, and Severe anxiety were also pertinent to this visit. has a past medical history of Arthritis; COPD (chronic obstructive pulmonary disease) ([address] Hypertension; Oxygen dependent; and Scoliosis. has a past surgical history that includes Tonsillectomy; other surgical history (Left, 2017); and Facial reconstruction surgery (Left, 2017). Restrictions  Restrictions/Precautions  Restrictions/Precautions: General Precautions, Fall Risk  Subjective   General  Chart Reviewed: Yes  Referring Practitioner: Dr. Glynn Mondragon  Subjective  Subjective: Pt reported no pain. Orientation  Orientation  Overall Orientation Status: Within Functional Limits  Cognition      Objective      Transfers  Sit to Stand: Contact guard assistance  Stand to sit: Contact guard assistance  Comment: Frequent cues for use of proper technique. Ambulation  Ambulation?: Yes  Ambulation 1  Surface: level tile  Device: Rolling Walker  Other Apparatus: O2  Assistance: Contact guard assistance  Distance: 50 feet x 3  Comments: Verbal cues for posture and safety with AD. Pt needed 2 seated rest breaks d/t fatigue/HERRERA. Stairs/Curb  Stairs?: No     Balance  Posture: Fair  Sitting - Static: Good  Sitting - Dynamic: Good  Standing - Static: Fair  Standing - Dynamic: Fair  Exercises  Hip Flexion: x20  Hip Abduction: x20  Knee Long Arc Quad: x20  Ankle Pumps: x20   Comments: Above exercises completed in sitting.        Assessment

## 2018-11-16 NOTE — PROGRESS NOTES
Report called and given to [facility] at [facility].

## 2018-11-16 NOTE — PROGRESS NOTES
are intact  SKIN:  No rashes. No skin lesions. LINES: antecubital IV in place, no signs of infection  -----------------------------------------------------------------  Diagnostic Data:    · All lines, drips, IV sites and medications reviewed  · All available data reviewed  Lab Results   Component Value Date    WBC 8.1 11/12/2018    HGB 11.0 (L) 11/12/2018    .7 11/12/2018     11/12/2018     Lab Results   Component Value Date    SEGS 90 (H) 11/12/2018    LYMPHOPCT 7 (L) 11/12/2018    MONOPCT 3 11/12/2018    EOSRELPCT 0 (L) 11/12/2018    BASOPCT 0 11/12/2018    NEUTROABS 7.29 11/12/2018    Lab Results   Component Value Date    GLUCOSE 82 11/16/2018    BUN 19 11/16/2018    CREATININE 0.63 (L) 11/16/2018     11/16/2018    K 4.5 11/16/2018    CALCIUM 8.5 (L) 11/16/2018    CL 94 (L) 11/16/2018    CO2 39 (H) 11/16/2018     Lab Results   Component Value Date    LACTA 1.8 09/29/2018       PROBLEM LIST:  Principal Problem:    COPD exacerbation ([facility]  Active Problems:    Acute on chronic respiratory failure (HCC)    COPD, severe (HCC)    Paroxysmal atrial fibrillation (HCC)    Hypertension    Tobacco abuse    Severe anxiety    ETOH abuse    Moderate malnutrition ([facility]  Resolved Problems:    * No resolved hospital problems. *      ASSESSMENT / PLAN:  · COPD exacerbation   ? supplemental O2  ? Bronchodilators  · Acute on chronic respiratory failure with hypoxia   ? O2, BiPAP as needed  · Acute metabolic encephalopathy due to respiratory failure / steroids  ? improving  · Paroxysmal atrial fibrillation with RVR  ? Diltiazem drip was required for rate control three times during this admission  ? Anticoagulated on Eliquis  ? Currently reconverted back to sinus rhythm  ? Discussed case with Dr. Beltran Romeo   ? Started on Sotalol for rate control stratedy  · Hypertension   ? Continue diltiazem PO  ? Losartan added  · Anxiety   ? Continue celexa  ? Ativan  · Moderate protein calorie malnutrition  ?  Appreciate

## 2018-11-17 LAB
EKG ATRIAL RATE: 69 BPM
EKG ATRIAL RATE: 79 BPM
EKG P AXIS: 82 DEGREES
EKG P AXIS: 83 DEGREES
EKG P-R INTERVAL: 140 MS
EKG P-R INTERVAL: 144 MS
EKG Q-T INTERVAL: 390 MS
EKG Q-T INTERVAL: 420 MS
EKG QRS DURATION: 72 MS
EKG QRS DURATION: 84 MS
EKG QTC CALCULATION (BAZETT): 447 MS
EKG QTC CALCULATION (BAZETT): 450 MS
EKG R AXIS: -16 DEGREES
EKG R AXIS: 44 DEGREES
EKG T AXIS: 79 DEGREES
EKG T AXIS: 84 DEGREES
EKG VENTRICULAR RATE: 69 BPM
EKG VENTRICULAR RATE: 79 BPM

## 2018-11-29 ENCOUNTER — TELEPHONE (OUTPATIENT)
Dept: PULMONOLOGY | Age: 53
End: 2018-11-29

## 2018-11-29 DIAGNOSIS — R91.1 LUNG NODULE: Primary | ICD-10-CM

## 2018-11-29 NOTE — TELEPHONE ENCOUNTER
Pt was an inpatient from 11/9/18-11/16/18 at [facility] and now he has new lung nodule findings on CT Scan. Nursing home wonders if you would like a PET Scan or more testing prior to his January appointment? Please call [facility] ask for [address] To give further instructions too.     Thanks

## 2019-01-01 NOTE — PROGRESS NOTES
Progress Note    SUBJECTIVE: Patient is seen for Principal Problem:    Acute on chronic respiratory failure (HCC)  Active Problems:    COPD, severe (HCC)    Essential hypertension    Dyslipidemia    COPD exacerbation (HCC)    Severe malnutrition (HCC)    Chronic respiratory failure with hypoxia (HCC)    Tobacco abuse  Resolved Problems:    * No resolved hospital problems. *     pt on 2 lit oxygen  onbipap last night  Hs some improvement in shortness of breath    OBJECTIVE:    Vitals:   Vitals:    08/11/18 0709   BP:    Pulse:    Resp:    Temp:    SpO2: 97%     Weight: 101 lb 8 oz (46 kg)   Height: 5' 6\" (167.6 cm)   -----------------------------------------------------------------  Exam:    CONSTITUTIONAL:  awake, alert, cooperative, no apparent distress, and appears stated age  EYES:  Lids and lashes normal, pupils equal, round and reactive to light, extra ocular muscles intact, sclera clear, conjunctiva normal  ENT:  Normocephalic, without obvious abnormality, atraumatic, sinuses nontender on palpation, external ears without lesions, oral pharynx with moist mucus membranes, tonsils without erythema or exudates, gums normal and good dentition. NECK:  Supple, symmetrical, trachea midline, no adenopathy, thyroid symmetric, not enlarged and no tenderness, skin normal  HEMATOLOGIC/LYMPHATICS:  no cervical lymphadenopathy  LUNGS:  Diminished air entry bilat,no tachypnea,has minimal expiratory wheezing  CARDIOVASCULAR:  Sinus tachycardia,in 110,s,no gallop,no edema  ABDOMEN:  No scars, normal bowel sounds, soft, non-distended, non-tender, no masses palpated, no hepatosplenomegally    MUSCULOSKELETAL:  there is no redness, warmth, or swelling of the joints  NEUROLOGIC:  Awake, alert, oriented to name, place and time. Cranial nerves II-XII are grossly intact. Motor is 5 out of 5 bilaterally. Cerebellar finger to nose, heel to shin intact. Sensory is intact.   Babinski down going, Romberg negative, and gait is Acid, Plasma   Result Value Ref Range    Lactic Acid 1.0 0.5 - 2.2 mmol/L    Lactic Acid, Whole Blood NOT REPORTED 0.7 - 2.1 mmol/L   Blood Gas, Arterial   Result Value Ref Range    pH, Arterial 7.296 (L) 7.35 - 7.45    pCO2, Arterial 77.4 (HH) 35 - 45 mmHg    pO2, Arterial 73.1 (L) 80 - 100 mmHg    HCO3, Arterial 36.9 (H) 22 - 26 mmol/L    Positive Base Excess, Art 7.2 (H) 0.0 - 2.0 mmol/L    Negative Base Excess, Art NOT REPORTED 0.0 - 2.0 mmol/L    O2 Sat, Arterial 92.4 (L) 95 - 98 %    Total Hb NOT REPORTED 12.0 - 16.0 g/dl    Oxyhemoglobin NOT REPORTED 95.0 - 98.0 %    Carboxyhemoglobin NOT REPORTED 0 - 5 %    Methemoglobin NOT REPORTED 0.0 - 1.9 %    Pt Temp 37     pH, Art, Temp Adj NOT REPORTED 7.350 - 7.450    pCO2, Art, Temp Adj NOT REPORTED     pO2, Art, Temp Adj NOT REPORTED 80.0 - 100.0 mmHg    O2 Device/Flow/% Cannula     Respiratory Rate NOT REPORTED     Robert Test PASS     Sample Site Right Radial Artery     Pt.  Position SEMI-FOWLERS     Mode NOT REPORTED     Set Rate NOT REPORTED     Total Rate NOT REPORTED     VT NOT REPORTED     FIO2 NOT REPORTED     Peep/Cpap NOT REPORTED     PSV NOT REPORTED     Text for Respiratory NC AT 2 LPM     NOTIFICATION NOT REPORTED     NOTIFICATION TIME NOT REPORTED    Blood gas, arterial   Result Value Ref Range    pH, Arterial 7.393 7.35 - 7.45    pCO2, Arterial 58.9 (H) 35 - 45 mmHg    pO2, Arterial 75.2 (L) 80 - 100 mmHg    HCO3, Arterial 35.1 (H) 22 - 26 mmol/L    Positive Base Excess, Art 8.0 (H) 0.0 - 2.0 mmol/L    Negative Base Excess, Art NOT REPORTED 0.0 - 2.0 mmol/L    O2 Sat, Arterial 94.7 (L) 95 - 98 %    Total Hb NOT REPORTED 12.0 - 16.0 g/dl    Oxyhemoglobin NOT REPORTED 95.0 - 98.0 %    Carboxyhemoglobin NOT REPORTED 0 - 5 %    Methemoglobin NOT REPORTED 0.0 - 1.9 %    Pt Temp 37     pH, Art, Temp Adj NOT REPORTED 7.350 - 7.450    pCO2, Art, Temp Adj NOT REPORTED     pO2, Art, Temp Adj NOT REPORTED 80.0 - 100.0 mmHg    O2 Device/Flow/% BIPAP 3.5 - 11.3 k/uL    RBC 3.41 (L) 4.21 - 5.77 m/uL    Hemoglobin 11.4 (L) 13.0 - 17.0 g/dL    Hematocrit 33.3 (L) 40.7 - 50.3 %    MCV 97.7 82.6 - 102.9 fL    MCH 33.4 25.2 - 33.5 pg    MCHC 34.2 28.4 - 34.8 g/dL    RDW 12.3 11.8 - 14.4 %    Platelets 633 301 - 766 k/uL    MPV 9.6 8.1 - 13.5 fL    NRBC Automated 0.0 0.0 per 100 WBC    Differential Type NOT REPORTED     WBC Morphology NOT REPORTED     RBC Morphology NOT REPORTED     Platelet Estimate NOT REPORTED     Seg Neutrophils 88 (H) 36 - 65 %    Lymphocytes 7 (L) 24 - 43 %    Monocytes 5 3 - 12 %    Eosinophils % 0 (L) 1 - 4 %    Basophils 0 0 - 2 %    Immature Granulocytes 0 0 %    Segs Absolute 9.65 (H) 1.50 - 8.10 k/uL    Absolute Lymph # 0.73 (L) 1.10 - 3.70 k/uL    Absolute Mono # 0.52 0.10 - 1.20 k/uL    Absolute Eos # <0.03 0.00 - 0.44 k/uL    Basophils # <0.03 0.00 - 0.20 k/uL    Absolute Immature Granulocyte 0.03 0.00 - 0.30 k/uL   Basic Metabolic Panel w/ Reflex to MG   Result Value Ref Range    Glucose 105 (H) 70 - 99 mg/dL    BUN 22 (H) 6 - 20 mg/dL    CREATININE 0.55 (L) 0.70 - 1.20 mg/dL    Bun/Cre Ratio 40 (H) 9 - 20    Calcium 8.9 8.6 - 10.4 mg/dL    Sodium 139 135 - 144 mmol/L    Potassium 4.0 3.7 - 5.3 mmol/L    Chloride 99 98 - 107 mmol/L    CO2 34 (H) 20 - 31 mmol/L    Anion Gap 6 (L) 9 - 17 mmol/L    GFR Non-African American >60 >60 mL/min    GFR African American >60 >60 mL/min    GFR Comment          GFR Staging         EKG 12 Lead   Result Value Ref Range    Ventricular Rate 123 BPM    Atrial Rate 123 BPM    P-R Interval 130 ms    QRS Duration 74 ms    Q-T Interval 300 ms    QTc Calculation (Bazett) 429 ms    P Axis 84 degrees    R Axis -21 degrees    T Axis 79 degrees       ASSESSMENT:   Patient Active Problem List    Diagnosis Date Noted    Tobacco abuse 08/10/2018    Acute on chronic respiratory failure ([facility] [address] 08/09/2018    Severe malnutrition ([facility] [address] 06/11/2018    Chronic respiratory failure with hypoxia (HCC) 06/11/2018    COPD Mother

## 2019-01-13 NOTE — PROGRESS NOTES
Alejandro Brunner M.D. Internal Medicine Progress Note 10/14/18    SUBJECTIVE:    Patient seen for f/u of COPD exacerbation ([address] He is feeling a little better. Still tight in the chest and congested. No bringing anything up. ROS:   Constitutional: negative  for fevers, and negative for chills. Respiratory: positive for shortness of breath, positive for cough, and positive for wheezing  Cardiovascular: negative for chest pain, and negative for palpitations  Gastrointestinal: negative for abdominal pain, negative for nausea,negative for vomiting, negative for diarrhea, and negative for constipation     All other systems were reviewed with the patient and are negative unless otherwise stated in HPI    OBJECTIVE:  Vitals:   Temp: 98.8 °F (37.1 °C)  BP: (!) 144/70  Resp: 16  Pulse: 113  SpO2: 97 %    24HR INTAKE/OUTPUT:    Intake/Output Summary (Last 24 hours) at 10/14/18 1018  Last data filed at 10/14/18 0905   Gross per 24 hour   Intake           2267.5 ml   Output              900 ml   Net           1367.5 ml     -----------------------------------------------------------------  Exam:  GEN:    Awake, alert and oriented x 3. no acute distress  EYES:  EOMI, pupils equal   NECK: Supple. No lymphadenopathy. No carotid bruit  CVS:    RRR, no murmur, rub or gallop  PULM:  diminished, no wheezing  ABD:    Bowels sounds normal.  Abdomen is soft. No distention. No tenderness. EXT:   1+ edema bilaterally . No calf tenderness. NEURO: Motor and sensory are intact  SKIN:  No rashes.   No skin lesions.    -----------------------------------------------------------------  Diagnostic Data:    · All available data reviewed  Lab Results   Component Value Date    WBC 10.4 10/11/2018    HGB 10.3 (L) 10/11/2018    .5 (H) 10/11/2018    PLT See Reflexed IPF Result 10/11/2018    Lab Results   Component Value Date    GLUCOSE 111 (H) 10/14/2018    BUN 21 (H) 10/14/2018    CREATININE 0.56 (L) 10/14/2018     patient/family

## 2019-01-22 PROBLEM — J44.1 COPD EXACERBATION (HCC): Status: RESOLVED | Noted: 2018-06-09 | Resolved: 2019-01-22

## 2019-01-31 ENCOUNTER — HOSPITAL ENCOUNTER (EMERGENCY)
Age: 54
Discharge: ANOTHER HOSPITAL | End: 2019-01-31
Attending: EMERGENCY MEDICINE
Payer: MEDICARE

## 2019-01-31 ENCOUNTER — HOSPITAL ENCOUNTER (INPATIENT)
Age: 54
LOS: 1 days | Discharge: HOSPICE | DRG: 189 | End: 2019-02-01
Attending: EMERGENCY MEDICINE | Admitting: INTERNAL MEDICINE
Payer: MEDICARE

## 2019-01-31 ENCOUNTER — APPOINTMENT (OUTPATIENT)
Dept: CT IMAGING | Age: 54
End: 2019-01-31
Payer: MEDICARE

## 2019-01-31 ENCOUNTER — APPOINTMENT (OUTPATIENT)
Dept: GENERAL RADIOLOGY | Age: 54
DRG: 189 | End: 2019-01-31
Payer: MEDICARE

## 2019-01-31 VITALS
DIASTOLIC BLOOD PRESSURE: 92 MMHG | SYSTOLIC BLOOD PRESSURE: 107 MMHG | BODY MASS INDEX: 16.82 KG/M2 | OXYGEN SATURATION: 96 % | RESPIRATION RATE: 20 BRPM | TEMPERATURE: 98 F | WEIGHT: 98 LBS | HEART RATE: 100 BPM

## 2019-01-31 DIAGNOSIS — J96.22 ACUTE ON CHRONIC RESPIRATORY FAILURE WITH HYPERCAPNIA (HCC): Primary | ICD-10-CM

## 2019-01-31 DIAGNOSIS — S22.000A CLOSED COMPRESSION FRACTURE OF THORACIC VERTEBRA, INITIAL ENCOUNTER (HCC): ICD-10-CM

## 2019-01-31 DIAGNOSIS — S09.90XA INJURY OF HEAD, INITIAL ENCOUNTER: Primary | ICD-10-CM

## 2019-01-31 DIAGNOSIS — R41.82 ALTERED MENTAL STATUS, UNSPECIFIED ALTERED MENTAL STATUS TYPE: ICD-10-CM

## 2019-01-31 DIAGNOSIS — S22.32XA CLOSED FRACTURE OF ONE RIB OF LEFT SIDE, INITIAL ENCOUNTER: ICD-10-CM

## 2019-01-31 DIAGNOSIS — S22.009A CLOSED FRACTURE OF THORACIC VERTEBRA, UNSPECIFIED FRACTURE MORPHOLOGY, UNSPECIFIED THORACIC VERTEBRAL LEVEL, INITIAL ENCOUNTER (HCC): ICD-10-CM

## 2019-01-31 DIAGNOSIS — S32.029A CLOSED FRACTURE OF SECOND LUMBAR VERTEBRA, UNSPECIFIED FRACTURE MORPHOLOGY, INITIAL ENCOUNTER (HCC): ICD-10-CM

## 2019-01-31 PROBLEM — J96.00 ACUTE RESPIRATORY FAILURE (HCC): Status: ACTIVE | Noted: 2019-01-31

## 2019-01-31 PROBLEM — J96.20 ACUTE ON CHRONIC RESPIRATORY FAILURE (HCC): Status: RESOLVED | Noted: 2018-08-09 | Resolved: 2019-01-31

## 2019-01-31 PROBLEM — J96.21 ACUTE ON CHRONIC RESPIRATORY FAILURE WITH HYPOXIA AND HYPERCAPNIA (HCC): Status: ACTIVE | Noted: 2019-01-31

## 2019-01-31 LAB
ALBUMIN SERPL-MCNC: 2.9 G/DL (ref 3.5–5.2)
ALBUMIN/GLOBULIN RATIO: 1.2 (ref 1–2.5)
ALLEN TEST: ABNORMAL
ALLEN TEST: ABNORMAL
ALP BLD-CCNC: 95 U/L (ref 40–129)
ALT SERPL-CCNC: 54 U/L (ref 5–41)
ANION GAP SERPL CALCULATED.3IONS-SCNC: ABNORMAL MMOL/L (ref 9–17)
AST SERPL-CCNC: 34 U/L
BILIRUB SERPL-MCNC: 0.73 MG/DL (ref 0.3–1.2)
BUN BLDV-MCNC: 28 MG/DL (ref 6–20)
BUN/CREAT BLD: 55 (ref 9–20)
CALCIUM SERPL-MCNC: 9.1 MG/DL (ref 8.6–10.4)
CARBOXYHEMOGLOBIN: 2.1 % (ref 0–5)
CARBOXYHEMOGLOBIN: ABNORMAL % (ref 0–5)
CHLORIDE BLD-SCNC: 90 MMOL/L (ref 98–107)
CO2: >45 MMOL/L (ref 20–31)
CREAT SERPL-MCNC: 0.51 MG/DL (ref 0.7–1.2)
EKG ATRIAL RATE: 107 BPM
EKG P AXIS: 87 DEGREES
EKG P-R INTERVAL: 136 MS
EKG Q-T INTERVAL: 378 MS
EKG QRS DURATION: 62 MS
EKG QTC CALCULATION (BAZETT): 504 MS
EKG R AXIS: 18 DEGREES
EKG T AXIS: 84 DEGREES
EKG VENTRICULAR RATE: 107 BPM
FIO2: ABNORMAL
FIO2: ABNORMAL
GFR AFRICAN AMERICAN: >60 ML/MIN
GFR NON-AFRICAN AMERICAN: >60 ML/MIN
GFR SERPL CREATININE-BSD FRML MDRD: ABNORMAL ML/MIN/{1.73_M2}
GFR SERPL CREATININE-BSD FRML MDRD: ABNORMAL ML/MIN/{1.73_M2}
GLUCOSE BLD-MCNC: 98 MG/DL (ref 70–99)
HCO3 ARTERIAL: 55.8 MMOL/L (ref 22–26)
HCO3 VENOUS: 43.6 MMOL/L (ref 24–30)
HCT VFR BLD CALC: 41.9 % (ref 40.7–50.3)
HEMOGLOBIN: 13.5 G/DL (ref 13–17)
MCH RBC QN AUTO: 33.2 PG (ref 25.2–33.5)
MCHC RBC AUTO-ENTMCNC: 32.2 G/DL (ref 28.4–34.8)
MCV RBC AUTO: 102.9 FL (ref 82.6–102.9)
METHEMOGLOBIN: ABNORMAL % (ref 0–1.5)
METHEMOGLOBIN: ABNORMAL % (ref 0–1.9)
MODE: ABNORMAL
MODE: ABNORMAL
NEGATIVE BASE EXCESS, ART: ABNORMAL MMOL/L (ref 0–2)
NEGATIVE BASE EXCESS, VEN: ABNORMAL MMOL/L (ref 0–2)
NOTIFICATION TIME: ABNORMAL
NOTIFICATION TIME: ABNORMAL
NOTIFICATION: ABNORMAL
NOTIFICATION: ABNORMAL
NRBC AUTOMATED: 0 PER 100 WBC
O2 DEVICE/FLOW/%: ABNORMAL
O2 DEVICE/FLOW/%: ABNORMAL
O2 SAT, ARTERIAL: 89.4 % (ref 95–98)
O2 SAT, VEN: 99.6 % (ref 60–85)
OXYHEMOGLOBIN: ABNORMAL % (ref 95–98)
OXYHEMOGLOBIN: ABNORMAL % (ref 95–98)
PATIENT TEMP: 37
PATIENT TEMP: 37
PCO2 ARTERIAL: 96.2 MMHG (ref 35–45)
PCO2, ART, TEMP ADJ: ABNORMAL
PCO2, VEN, TEMP ADJ: ABNORMAL MMHG (ref 39–55)
PCO2, VEN: 69 (ref 39–55)
PDW BLD-RTO: 13.2 % (ref 11.8–14.4)
PEEP/CPAP: ABNORMAL
PEEP/CPAP: ABNORMAL
PH ARTERIAL: 7.38 (ref 7.35–7.45)
PH VENOUS: 7.42 (ref 7.32–7.42)
PH, ART, TEMP ADJ: ABNORMAL (ref 7.35–7.45)
PH, VEN, TEMP ADJ: ABNORMAL (ref 7.32–7.42)
PLATELET # BLD: 114 K/UL (ref 138–453)
PMV BLD AUTO: 9 FL (ref 8.1–13.5)
PO2 ARTERIAL: 61.9 MMHG (ref 80–100)
PO2, ART, TEMP ADJ: ABNORMAL MMHG (ref 80–100)
PO2, VEN, TEMP ADJ: ABNORMAL MMHG (ref 30–50)
PO2, VEN: 167 (ref 30–50)
POSITIVE BASE EXCESS, ART: 24 MMOL/L (ref 0–2)
POSITIVE BASE EXCESS, VEN: 15.9 MMOL/L (ref 0–2)
POTASSIUM SERPL-SCNC: 4.3 MMOL/L (ref 3.7–5.3)
PSV: ABNORMAL
PSV: ABNORMAL
PT. POSITION: ABNORMAL
PT. POSITION: ABNORMAL
RBC # BLD: 4.07 M/UL (ref 4.21–5.77)
RESPIRATORY RATE: ABNORMAL
RESPIRATORY RATE: ABNORMAL
SAMPLE SITE: ABNORMAL
SAMPLE SITE: ABNORMAL
SET RATE: ABNORMAL
SET RATE: ABNORMAL
SODIUM BLD-SCNC: 143 MMOL/L (ref 135–144)
TEXT FOR RESPIRATORY: ABNORMAL
TEXT FOR RESPIRATORY: ABNORMAL
TOTAL HB: ABNORMAL G/DL (ref 12–16)
TOTAL HB: ABNORMAL G/DL (ref 12–16)
TOTAL PROTEIN: 5.4 G/DL (ref 6.4–8.3)
TOTAL RATE: ABNORMAL
TOTAL RATE: ABNORMAL
TROPONIN INTERP: NORMAL
TROPONIN T: <0.03 NG/ML
TROPONIN, HIGH SENSITIVITY: NORMAL NG/L (ref 0–22)
VT: ABNORMAL
VT: ABNORMAL
WBC # BLD: 8.7 K/UL (ref 3.5–11.3)

## 2019-01-31 PROCEDURE — APPSS180 APP SPLIT SHARED TIME > 60 MINUTES: Performed by: NURSE PRACTITIONER

## 2019-01-31 PROCEDURE — 94770 HC ETCO2 MONITOR DAILY: CPT

## 2019-01-31 PROCEDURE — 70450 CT HEAD/BRAIN W/O DYE: CPT

## 2019-01-31 PROCEDURE — 85027 COMPLETE CBC AUTOMATED: CPT

## 2019-01-31 PROCEDURE — 6360000002 HC RX W HCPCS: Performed by: STUDENT IN AN ORGANIZED HEALTH CARE EDUCATION/TRAINING PROGRAM

## 2019-01-31 PROCEDURE — 99285 EMERGENCY DEPT VISIT HI MDM: CPT

## 2019-01-31 PROCEDURE — 6370000000 HC RX 637 (ALT 250 FOR IP): Performed by: STUDENT IN AN ORGANIZED HEALTH CARE EDUCATION/TRAINING PROGRAM

## 2019-01-31 PROCEDURE — 96375 TX/PRO/DX INJ NEW DRUG ADDON: CPT

## 2019-01-31 PROCEDURE — 96374 THER/PROPH/DIAG INJ IV PUSH: CPT

## 2019-01-31 PROCEDURE — 82805 BLOOD GASES W/O2 SATURATION: CPT

## 2019-01-31 PROCEDURE — 84484 ASSAY OF TROPONIN QUANT: CPT

## 2019-01-31 PROCEDURE — 6360000002 HC RX W HCPCS: Performed by: INTERNAL MEDICINE

## 2019-01-31 PROCEDURE — 99223 1ST HOSP IP/OBS HIGH 75: CPT | Performed by: INTERNAL MEDICINE

## 2019-01-31 PROCEDURE — 72125 CT NECK SPINE W/O DYE: CPT

## 2019-01-31 PROCEDURE — 6370000000 HC RX 637 (ALT 250 FOR IP): Performed by: EMERGENCY MEDICINE

## 2019-01-31 PROCEDURE — 80053 COMPREHEN METABOLIC PANEL: CPT

## 2019-01-31 PROCEDURE — 6360000002 HC RX W HCPCS: Performed by: EMERGENCY MEDICINE

## 2019-01-31 PROCEDURE — 94660 CPAP INITIATION&MGMT: CPT

## 2019-01-31 PROCEDURE — 94640 AIRWAY INHALATION TREATMENT: CPT

## 2019-01-31 PROCEDURE — 2580000003 HC RX 258: Performed by: STUDENT IN AN ORGANIZED HEALTH CARE EDUCATION/TRAINING PROGRAM

## 2019-01-31 PROCEDURE — 36415 COLL VENOUS BLD VENIPUNCTURE: CPT

## 2019-01-31 PROCEDURE — 6360000002 HC RX W HCPCS

## 2019-01-31 PROCEDURE — 93005 ELECTROCARDIOGRAM TRACING: CPT

## 2019-01-31 PROCEDURE — 94664 DEMO&/EVAL PT USE INHALER: CPT

## 2019-01-31 PROCEDURE — 6360000004 HC RX CONTRAST MEDICATION: Performed by: EMERGENCY MEDICINE

## 2019-01-31 PROCEDURE — 74177 CT ABD & PELVIS W/CONTRAST: CPT

## 2019-01-31 PROCEDURE — 72170 X-RAY EXAM OF PELVIS: CPT

## 2019-01-31 PROCEDURE — 2060000000 HC ICU INTERMEDIATE R&B

## 2019-01-31 PROCEDURE — 2700000000 HC OXYGEN THERAPY PER DAY

## 2019-01-31 PROCEDURE — 71045 X-RAY EXAM CHEST 1 VIEW: CPT

## 2019-01-31 PROCEDURE — 71250 CT THORAX DX C-: CPT

## 2019-01-31 RX ORDER — MAGNESIUM SULFATE 1 G/100ML
1 INJECTION INTRAVENOUS PRN
Status: DISCONTINUED | OUTPATIENT
Start: 2019-01-31 | End: 2019-02-01 | Stop reason: HOSPADM

## 2019-01-31 RX ORDER — FUROSEMIDE 40 MG/1
40 TABLET ORAL DAILY
Status: DISCONTINUED | OUTPATIENT
Start: 2019-01-31 | End: 2019-02-01 | Stop reason: HOSPADM

## 2019-01-31 RX ORDER — IPRATROPIUM BROMIDE AND ALBUTEROL SULFATE 2.5; .5 MG/3ML; MG/3ML
1 SOLUTION RESPIRATORY (INHALATION) ONCE
Status: COMPLETED | OUTPATIENT
Start: 2019-01-31 | End: 2019-01-31

## 2019-01-31 RX ORDER — ONDANSETRON 2 MG/ML
4 INJECTION INTRAMUSCULAR; INTRAVENOUS EVERY 6 HOURS PRN
Status: DISCONTINUED | OUTPATIENT
Start: 2019-01-31 | End: 2019-02-01 | Stop reason: HOSPADM

## 2019-01-31 RX ORDER — IPRATROPIUM BROMIDE AND ALBUTEROL SULFATE 2.5; .5 MG/3ML; MG/3ML
1 SOLUTION RESPIRATORY (INHALATION)
Status: DISCONTINUED | OUTPATIENT
Start: 2019-01-31 | End: 2019-01-31

## 2019-01-31 RX ORDER — MORPHINE SULFATE 4 MG/ML
2 INJECTION, SOLUTION INTRAMUSCULAR; INTRAVENOUS ONCE
Status: COMPLETED | OUTPATIENT
Start: 2019-01-31 | End: 2019-01-31

## 2019-01-31 RX ORDER — DILTIAZEM HYDROCHLORIDE 180 MG/1
180 CAPSULE, COATED, EXTENDED RELEASE ORAL DAILY
Status: DISCONTINUED | OUTPATIENT
Start: 2019-01-31 | End: 2019-02-01 | Stop reason: HOSPADM

## 2019-01-31 RX ORDER — POTASSIUM CHLORIDE 7.45 MG/ML
10 INJECTION INTRAVENOUS PRN
Status: DISCONTINUED | OUTPATIENT
Start: 2019-01-31 | End: 2019-02-01 | Stop reason: HOSPADM

## 2019-01-31 RX ORDER — ALBUTEROL SULFATE 2.5 MG/3ML
2.5 SOLUTION RESPIRATORY (INHALATION)
Status: DISCONTINUED | OUTPATIENT
Start: 2019-01-31 | End: 2019-01-31

## 2019-01-31 RX ORDER — LORAZEPAM 2 MG/ML
1 INJECTION INTRAMUSCULAR
Status: COMPLETED | OUTPATIENT
Start: 2019-01-31 | End: 2019-01-31

## 2019-01-31 RX ORDER — SOTALOL HYDROCHLORIDE 80 MG/1
80 TABLET ORAL 2 TIMES DAILY
Status: DISCONTINUED | OUTPATIENT
Start: 2019-01-31 | End: 2019-02-01 | Stop reason: HOSPADM

## 2019-01-31 RX ORDER — ALBUTEROL SULFATE 90 UG/1
2 AEROSOL, METERED RESPIRATORY (INHALATION)
Status: DISCONTINUED | OUTPATIENT
Start: 2019-01-31 | End: 2019-01-31

## 2019-01-31 RX ORDER — BISACODYL 10 MG
10 SUPPOSITORY, RECTAL RECTAL DAILY PRN
Status: DISCONTINUED | OUTPATIENT
Start: 2019-01-31 | End: 2019-02-01 | Stop reason: HOSPADM

## 2019-01-31 RX ORDER — HALOPERIDOL 5 MG/ML
2 INJECTION INTRAMUSCULAR EVERY 4 HOURS PRN
Status: DISCONTINUED | OUTPATIENT
Start: 2019-01-31 | End: 2019-01-31

## 2019-01-31 RX ORDER — METHADONE HYDROCHLORIDE 10 MG/1
10 TABLET ORAL
Status: DISCONTINUED | OUTPATIENT
Start: 2019-02-01 | End: 2019-02-01 | Stop reason: HOSPADM

## 2019-01-31 RX ORDER — METHYLPREDNISOLONE SODIUM SUCCINATE 40 MG/ML
40 INJECTION, POWDER, LYOPHILIZED, FOR SOLUTION INTRAMUSCULAR; INTRAVENOUS EVERY 6 HOURS
Status: DISCONTINUED | OUTPATIENT
Start: 2019-01-31 | End: 2019-02-01 | Stop reason: HOSPADM

## 2019-01-31 RX ORDER — SODIUM CHLORIDE 9 MG/ML
INJECTION, SOLUTION INTRAVENOUS CONTINUOUS
Status: DISCONTINUED | OUTPATIENT
Start: 2019-01-31 | End: 2019-01-31

## 2019-01-31 RX ORDER — LORAZEPAM 2 MG/ML
INJECTION INTRAMUSCULAR
Status: COMPLETED
Start: 2019-01-31 | End: 2019-01-31

## 2019-01-31 RX ORDER — POTASSIUM CHLORIDE 20MEQ/15ML
40 LIQUID (ML) ORAL PRN
Status: DISCONTINUED | OUTPATIENT
Start: 2019-01-31 | End: 2019-02-01 | Stop reason: HOSPADM

## 2019-01-31 RX ORDER — METHADONE HYDROCHLORIDE 10 MG/1
5 TABLET ORAL NIGHTLY
Status: DISCONTINUED | OUTPATIENT
Start: 2019-01-31 | End: 2019-02-01 | Stop reason: HOSPADM

## 2019-01-31 RX ORDER — HALOPERIDOL 5 MG/ML
2 INJECTION INTRAMUSCULAR EVERY 6 HOURS PRN
Status: DISCONTINUED | OUTPATIENT
Start: 2019-01-31 | End: 2019-02-01 | Stop reason: HOSPADM

## 2019-01-31 RX ORDER — ACETAMINOPHEN 325 MG/1
650 TABLET ORAL EVERY 4 HOURS PRN
Status: DISCONTINUED | OUTPATIENT
Start: 2019-01-31 | End: 2019-02-01 | Stop reason: HOSPADM

## 2019-01-31 RX ORDER — POTASSIUM CHLORIDE 20 MEQ/1
40 TABLET, EXTENDED RELEASE ORAL PRN
Status: DISCONTINUED | OUTPATIENT
Start: 2019-01-31 | End: 2019-02-01 | Stop reason: HOSPADM

## 2019-01-31 RX ORDER — SODIUM CHLORIDE 0.9 % (FLUSH) 0.9 %
10 SYRINGE (ML) INJECTION PRN
Status: DISCONTINUED | OUTPATIENT
Start: 2019-01-31 | End: 2019-02-01 | Stop reason: HOSPADM

## 2019-01-31 RX ORDER — LOSARTAN POTASSIUM 25 MG/1
25 TABLET ORAL DAILY
Status: DISCONTINUED | OUTPATIENT
Start: 2019-01-31 | End: 2019-02-01 | Stop reason: HOSPADM

## 2019-01-31 RX ORDER — LORAZEPAM 2 MG/ML
0.5 INJECTION INTRAMUSCULAR EVERY 4 HOURS PRN
Status: DISCONTINUED | OUTPATIENT
Start: 2019-01-31 | End: 2019-01-31

## 2019-01-31 RX ORDER — ALBUTEROL SULFATE 2.5 MG/3ML
2.5 SOLUTION RESPIRATORY (INHALATION) EVERY 4 HOURS PRN
Status: DISCONTINUED | OUTPATIENT
Start: 2019-01-31 | End: 2019-02-01 | Stop reason: HOSPADM

## 2019-01-31 RX ORDER — IPRATROPIUM BROMIDE AND ALBUTEROL SULFATE 2.5; .5 MG/3ML; MG/3ML
1 SOLUTION RESPIRATORY (INHALATION)
Status: DISCONTINUED | OUTPATIENT
Start: 2019-01-31 | End: 2019-02-01 | Stop reason: HOSPADM

## 2019-01-31 RX ORDER — ALBUTEROL SULFATE 2.5 MG/3ML
5 SOLUTION RESPIRATORY (INHALATION)
Status: DISCONTINUED | OUTPATIENT
Start: 2019-01-31 | End: 2019-01-31

## 2019-01-31 RX ORDER — VENLAFAXINE HYDROCHLORIDE 150 MG/1
150 CAPSULE, EXTENDED RELEASE ORAL DAILY
Status: DISCONTINUED | OUTPATIENT
Start: 2019-01-31 | End: 2019-02-01 | Stop reason: HOSPADM

## 2019-01-31 RX ORDER — SODIUM CHLORIDE 0.9 % (FLUSH) 0.9 %
10 SYRINGE (ML) INJECTION EVERY 12 HOURS SCHEDULED
Status: DISCONTINUED | OUTPATIENT
Start: 2019-01-31 | End: 2019-02-01 | Stop reason: HOSPADM

## 2019-01-31 RX ORDER — LORAZEPAM 2 MG/ML
0.5 INJECTION INTRAMUSCULAR ONCE
Status: COMPLETED | OUTPATIENT
Start: 2019-01-31 | End: 2019-01-31

## 2019-01-31 RX ADMIN — IOPAMIDOL 75 ML: 755 INJECTION, SOLUTION INTRAVENOUS at 08:37

## 2019-01-31 RX ADMIN — MORPHINE SULFATE 2 MG: 4 INJECTION INTRAVENOUS at 13:15

## 2019-01-31 RX ADMIN — LORAZEPAM 0.5 MG: 2 INJECTION INTRAMUSCULAR; INTRAVENOUS at 10:16

## 2019-01-31 RX ADMIN — ALBUTEROL SULFATE 5 MG: 2.5 SOLUTION RESPIRATORY (INHALATION) at 11:54

## 2019-01-31 RX ADMIN — METHYLPREDNISOLONE SODIUM SUCCINATE 40 MG: 40 INJECTION, POWDER, FOR SOLUTION INTRAMUSCULAR; INTRAVENOUS at 18:55

## 2019-01-31 RX ADMIN — ALBUTEROL SULFATE 5 MG: 2.5 SOLUTION RESPIRATORY (INHALATION) at 17:11

## 2019-01-31 RX ADMIN — IPRATROPIUM BROMIDE AND ALBUTEROL SULFATE 1 AMPULE: .5; 3 SOLUTION RESPIRATORY (INHALATION) at 17:11

## 2019-01-31 RX ADMIN — IPRATROPIUM BROMIDE 0.5 MG: 0.5 SOLUTION RESPIRATORY (INHALATION) at 11:53

## 2019-01-31 RX ADMIN — HALOPERIDOL LACTATE 2.5 MG: 5 INJECTION, SOLUTION INTRAMUSCULAR at 17:02

## 2019-01-31 RX ADMIN — SODIUM CHLORIDE: 9 INJECTION, SOLUTION INTRAVENOUS at 15:57

## 2019-01-31 RX ADMIN — LORAZEPAM 1 MG: 2 INJECTION INTRAMUSCULAR; INTRAVENOUS at 14:59

## 2019-01-31 RX ADMIN — LORAZEPAM 1 MG: 2 INJECTION INTRAMUSCULAR; INTRAVENOUS at 13:39

## 2019-01-31 RX ADMIN — ALBUTEROL SULFATE 2.5 MG: 2.5 SOLUTION RESPIRATORY (INHALATION) at 19:33

## 2019-01-31 RX ADMIN — IPRATROPIUM BROMIDE AND ALBUTEROL SULFATE 1 AMPULE: .5; 3 SOLUTION RESPIRATORY (INHALATION) at 09:40

## 2019-01-31 ASSESSMENT — PAIN SCALES - GENERAL
PAINLEVEL_OUTOF10: 8
PAINLEVEL_OUTOF10: 3
PAINLEVEL_OUTOF10: 3

## 2019-01-31 ASSESSMENT — ENCOUNTER SYMPTOMS
DIARRHEA: 0
NAUSEA: 0
ABDOMINAL DISTENTION: 0
VOMITING: 0
COLOR CHANGE: 0
SORE THROAT: 0
CHEST TIGHTNESS: 0
SINUS PAIN: 0
ABDOMINAL PAIN: 1
SINUS PRESSURE: 0
BACK PAIN: 1
WHEEZING: 0
COUGH: 0
SHORTNESS OF BREATH: 0

## 2019-01-31 ASSESSMENT — PAIN DESCRIPTION - PAIN TYPE: TYPE: ACUTE PAIN

## 2019-01-31 ASSESSMENT — PAIN DESCRIPTION - LOCATION: LOCATION: HEAD

## 2019-01-31 ASSESSMENT — PAIN DESCRIPTION - DESCRIPTORS: DESCRIPTORS: ACHING

## 2019-02-01 VITALS
WEIGHT: 109.13 LBS | HEART RATE: 128 BPM | RESPIRATION RATE: 17 BRPM | OXYGEN SATURATION: 100 % | DIASTOLIC BLOOD PRESSURE: 108 MMHG | TEMPERATURE: 98.3 F | SYSTOLIC BLOOD PRESSURE: 147 MMHG | BODY MASS INDEX: 18.73 KG/M2

## 2019-02-01 LAB
ANION GAP SERPL CALCULATED.3IONS-SCNC: 12 MMOL/L (ref 9–17)
BUN BLDV-MCNC: 21 MG/DL (ref 6–20)
BUN/CREAT BLD: ABNORMAL (ref 9–20)
CALCIUM SERPL-MCNC: 9.1 MG/DL (ref 8.6–10.4)
CHLORIDE BLD-SCNC: 97 MMOL/L (ref 98–107)
CO2: 35 MMOL/L (ref 20–31)
CREAT SERPL-MCNC: 0.44 MG/DL (ref 0.7–1.2)
GFR AFRICAN AMERICAN: >60 ML/MIN
GFR NON-AFRICAN AMERICAN: >60 ML/MIN
GFR SERPL CREATININE-BSD FRML MDRD: ABNORMAL ML/MIN/{1.73_M2}
GFR SERPL CREATININE-BSD FRML MDRD: ABNORMAL ML/MIN/{1.73_M2}
GLUCOSE BLD-MCNC: 90 MG/DL (ref 70–99)
HCT VFR BLD CALC: 41 % (ref 40.7–50.3)
HEMOGLOBIN: 13.2 G/DL (ref 13–17)
INR BLD: 1
MCH RBC QN AUTO: 33.5 PG (ref 25.2–33.5)
MCHC RBC AUTO-ENTMCNC: 32.2 G/DL (ref 28.4–34.8)
MCV RBC AUTO: 104.1 FL (ref 82.6–102.9)
NRBC AUTOMATED: 0.2 PER 100 WBC
PDW BLD-RTO: 13.4 % (ref 11.8–14.4)
PLATELET # BLD: 104 K/UL (ref 138–453)
PMV BLD AUTO: 9.7 FL (ref 8.1–13.5)
POTASSIUM SERPL-SCNC: 4.3 MMOL/L (ref 3.7–5.3)
PROTHROMBIN TIME: 11 SEC (ref 9–12)
RBC # BLD: 3.94 M/UL (ref 4.21–5.77)
SODIUM BLD-SCNC: 144 MMOL/L (ref 135–144)
WBC # BLD: 8.1 K/UL (ref 3.5–11.3)

## 2019-02-01 PROCEDURE — 6360000002 HC RX W HCPCS: Performed by: INTERNAL MEDICINE

## 2019-02-01 PROCEDURE — 85610 PROTHROMBIN TIME: CPT

## 2019-02-01 PROCEDURE — 2500000003 HC RX 250 WO HCPCS: Performed by: NURSE PRACTITIONER

## 2019-02-01 PROCEDURE — 6370000000 HC RX 637 (ALT 250 FOR IP): Performed by: INTERNAL MEDICINE

## 2019-02-01 PROCEDURE — 99239 HOSP IP/OBS DSCHRG MGMT >30: CPT | Performed by: INTERNAL MEDICINE

## 2019-02-01 PROCEDURE — 85027 COMPLETE CBC AUTOMATED: CPT

## 2019-02-01 PROCEDURE — 99223 1ST HOSP IP/OBS HIGH 75: CPT | Performed by: FAMILY MEDICINE

## 2019-02-01 PROCEDURE — 94640 AIRWAY INHALATION TREATMENT: CPT

## 2019-02-01 PROCEDURE — 2700000000 HC OXYGEN THERAPY PER DAY

## 2019-02-01 PROCEDURE — 94660 CPAP INITIATION&MGMT: CPT

## 2019-02-01 PROCEDURE — 94762 N-INVAS EAR/PLS OXIMTRY CONT: CPT

## 2019-02-01 PROCEDURE — 94770 HC ETCO2 MONITOR DAILY: CPT

## 2019-02-01 PROCEDURE — 80048 BASIC METABOLIC PNL TOTAL CA: CPT

## 2019-02-01 PROCEDURE — 36415 COLL VENOUS BLD VENIPUNCTURE: CPT

## 2019-02-01 PROCEDURE — 2580000003 HC RX 258: Performed by: INTERNAL MEDICINE

## 2019-02-01 RX ORDER — METOPROLOL TARTRATE 5 MG/5ML
5 INJECTION INTRAVENOUS EVERY 4 HOURS PRN
Status: DISCONTINUED | OUTPATIENT
Start: 2019-02-01 | End: 2019-02-01 | Stop reason: HOSPADM

## 2019-02-01 RX ADMIN — IPRATROPIUM BROMIDE AND ALBUTEROL SULFATE 1 AMPULE: .5; 3 SOLUTION RESPIRATORY (INHALATION) at 07:22

## 2019-02-01 RX ADMIN — IPRATROPIUM BROMIDE AND ALBUTEROL SULFATE 1 AMPULE: .5; 3 SOLUTION RESPIRATORY (INHALATION) at 11:22

## 2019-02-01 RX ADMIN — METHYLPREDNISOLONE SODIUM SUCCINATE 40 MG: 40 INJECTION, POWDER, FOR SOLUTION INTRAMUSCULAR; INTRAVENOUS at 06:38

## 2019-02-01 RX ADMIN — METOPROLOL TARTRATE 5 MG: 1 INJECTION, SOLUTION INTRAVENOUS at 03:32

## 2019-02-01 RX ADMIN — METHYLPREDNISOLONE SODIUM SUCCINATE 40 MG: 40 INJECTION, POWDER, FOR SOLUTION INTRAMUSCULAR; INTRAVENOUS at 19:39

## 2019-02-01 RX ADMIN — IPRATROPIUM BROMIDE AND ALBUTEROL SULFATE 1 AMPULE: .5; 3 SOLUTION RESPIRATORY (INHALATION) at 15:24

## 2019-02-01 RX ADMIN — Medication 10 ML: at 01:40

## 2019-02-01 RX ADMIN — METHYLPREDNISOLONE SODIUM SUCCINATE 40 MG: 40 INJECTION, POWDER, FOR SOLUTION INTRAMUSCULAR; INTRAVENOUS at 01:32

## 2019-02-01 RX ADMIN — METHYLPREDNISOLONE SODIUM SUCCINATE 40 MG: 40 INJECTION, POWDER, FOR SOLUTION INTRAMUSCULAR; INTRAVENOUS at 13:08

## 2019-02-01 RX ADMIN — Medication 10 ML: at 06:39

## 2019-02-05 PROBLEM — S09.90XA HEAD INJURY: Status: ACTIVE | Noted: 2019-02-05

## (undated) DEVICE — NDLCTR: FOAM/MAG 40CT 64/CS: Brand: MEDICAL ACTION INDUSTRIES

## (undated) DEVICE — DBD-PACK,BASIC,VI,AURORA: Brand: MEDLINE

## (undated) DEVICE — DISCONTINUED USE 394504 SYRINGE LUER LOCK TIP 12 ML

## (undated) DEVICE — Z DISCONTINUED BY MEDLINE USE 2711682 TRAY SKIN PREP DRY W/ PREM GLV

## (undated) DEVICE — STERILE POLYISOPRENE POWDER-FREE SURGICAL GLOVES: Brand: PROTEXIS

## (undated) DEVICE — PENCIL ES L3M BTTN SWCH HOLSTER W/ BLDE ELECTRD EDGE

## (undated) DEVICE — DRAPE PED 77INX108IN REINF FENEST ARMBRD

## (undated) DEVICE — NEEDLE HYPO 27GA L1.25IN GRY POLYPR HUB S STL REG BVL STR

## (undated) DEVICE — SOLUTION SCRB 4OZ 4% CHG CLN BASE FOR PT SKIN ANTISEPSIS

## (undated) DEVICE — NEEDLE HYPO 22GA L1.5IN BLK S STL HUB POLYPR SHLD REG BVL

## (undated) DEVICE — GOWN,AURORA,NON-REINFORCED,2XL: Brand: MEDLINE

## (undated) DEVICE — COVER LT HNDL BLU PLAS

## (undated) DEVICE — TOPICAL TISSUE ADHESIVE WITH FLAT TIP: Brand: DERMA+FLEX QS

## (undated) DEVICE — INTENDED FOR TISSUE SEPARATION, AND OTHER PROCEDURES THAT REQUIRE A SHARP SURGICAL BLADE TO PUNCTURE OR CUT.: Brand: BARD-PARKER ® CARBON RIB-BACK BLADES

## (undated) DEVICE — Device

## (undated) DEVICE — SOLUTION IV IRRIG POUR BRL 0.9% SODIUM CHL 2F7124

## (undated) DEVICE — GAUZE,SPONGE,4"X4",16PLY,XRAY,STRL,LF: Brand: MEDLINE

## (undated) DEVICE — SUTURE PROL SZ 5-0 L18IN NONABSORBABLE BLU L16MM PC-3 3/8 8635G

## (undated) DEVICE — ELECTRODE ES AD CRD L15FT DISP FOR PT BELOW 30LB REM